# Patient Record
Sex: MALE | Race: BLACK OR AFRICAN AMERICAN | NOT HISPANIC OR LATINO
[De-identification: names, ages, dates, MRNs, and addresses within clinical notes are randomized per-mention and may not be internally consistent; named-entity substitution may affect disease eponyms.]

---

## 2016-07-01 RX ORDER — CARVEDILOL PHOSPHATE 80 MG/1
1 CAPSULE, EXTENDED RELEASE ORAL
Qty: 0 | Refills: 0 | COMMUNITY
Start: 2016-07-01

## 2016-07-01 RX ORDER — MIRTAZAPINE 45 MG/1
1 TABLET, ORALLY DISINTEGRATING ORAL
Qty: 0 | Refills: 0 | COMMUNITY
Start: 2016-07-01

## 2016-07-01 RX ORDER — FUROSEMIDE 40 MG
1 TABLET ORAL
Qty: 0 | Refills: 0 | COMMUNITY
Start: 2016-07-01

## 2017-01-19 ENCOUNTER — APPOINTMENT (OUTPATIENT)
Dept: ELECTROPHYSIOLOGY | Facility: CLINIC | Age: 72
End: 2017-01-19

## 2017-03-21 ENCOUNTER — NON-APPOINTMENT (OUTPATIENT)
Age: 72
End: 2017-03-21

## 2017-03-21 ENCOUNTER — APPOINTMENT (OUTPATIENT)
Dept: ELECTROPHYSIOLOGY | Facility: CLINIC | Age: 72
End: 2017-03-21

## 2017-03-21 VITALS — SYSTOLIC BLOOD PRESSURE: 122 MMHG | DIASTOLIC BLOOD PRESSURE: 83 MMHG

## 2017-03-21 VITALS
WEIGHT: 129 LBS | DIASTOLIC BLOOD PRESSURE: 74 MMHG | SYSTOLIC BLOOD PRESSURE: 110 MMHG | BODY MASS INDEX: 21.49 KG/M2 | HEART RATE: 88 BPM | OXYGEN SATURATION: 100 % | HEIGHT: 65 IN

## 2017-04-07 ENCOUNTER — LABORATORY RESULT (OUTPATIENT)
Age: 72
End: 2017-04-07

## 2017-04-07 ENCOUNTER — APPOINTMENT (OUTPATIENT)
Dept: ELECTROPHYSIOLOGY | Facility: CLINIC | Age: 72
End: 2017-04-07

## 2017-04-07 ENCOUNTER — NON-APPOINTMENT (OUTPATIENT)
Age: 72
End: 2017-04-07

## 2017-04-07 VITALS
BODY MASS INDEX: 21.49 KG/M2 | SYSTOLIC BLOOD PRESSURE: 137 MMHG | OXYGEN SATURATION: 100 % | HEART RATE: 89 BPM | WEIGHT: 129 LBS | HEIGHT: 65 IN | DIASTOLIC BLOOD PRESSURE: 96 MMHG

## 2017-04-07 RX ORDER — CARVEDILOL 3.12 MG/1
3.12 TABLET, FILM COATED ORAL
Qty: 60 | Refills: 2 | Status: DISCONTINUED | COMMUNITY
Start: 2017-03-21 | End: 2017-04-07

## 2017-04-07 RX ORDER — METOPROLOL SUCCINATE 50 MG/1
50 TABLET, EXTENDED RELEASE ORAL DAILY
Refills: 0 | Status: DISCONTINUED | COMMUNITY
End: 2017-04-07

## 2017-04-08 LAB
ALBUMIN SERPL ELPH-MCNC: 3.8 G/DL
ALP BLD-CCNC: 133 U/L
ALT SERPL-CCNC: 34 U/L
ANION GAP SERPL CALC-SCNC: 23 MMOL/L
AST SERPL-CCNC: 37 U/L
BASOPHILS # BLD AUTO: 0 K/UL
BASOPHILS NFR BLD AUTO: 0 %
BILIRUB SERPL-MCNC: 1 MG/DL
BUN SERPL-MCNC: 27 MG/DL
CALCIUM SERPL-MCNC: 10.5 MG/DL
CHLORIDE SERPL-SCNC: 95 MMOL/L
CO2 SERPL-SCNC: 24 MMOL/L
CREAT SERPL-MCNC: 1.19 MG/DL
EOSINOPHIL # BLD AUTO: 0.17 K/UL
EOSINOPHIL NFR BLD AUTO: 1.8 %
GLUCOSE SERPL-MCNC: 34 MG/DL
HCT VFR BLD CALC: 41.9 %
HGB BLD-MCNC: 13.8 G/DL
LYMPHOCYTES # BLD AUTO: 2.13 K/UL
LYMPHOCYTES NFR BLD AUTO: 22.9 %
MAN DIFF?: NORMAL
MCHC RBC-ENTMCNC: 32.9 GM/DL
MCHC RBC-ENTMCNC: 33.3 PG
MCV RBC AUTO: 101.2 FL
MONOCYTES # BLD AUTO: 0.51 K/UL
MONOCYTES NFR BLD AUTO: 5.5 %
NEUTROPHILS # BLD AUTO: 6.15 K/UL
NEUTROPHILS NFR BLD AUTO: 66.1 %
PLATELET # BLD AUTO: 300 K/UL
POTASSIUM SERPL-SCNC: 4.4 MMOL/L
PROT SERPL-MCNC: 7.7 G/DL
RBC # BLD: 4.14 M/UL
RBC # FLD: 18.7 %
SODIUM SERPL-SCNC: 142 MMOL/L
WBC # FLD AUTO: 9.31 K/UL

## 2017-05-02 ENCOUNTER — NON-APPOINTMENT (OUTPATIENT)
Age: 72
End: 2017-05-02

## 2017-05-02 ENCOUNTER — APPOINTMENT (OUTPATIENT)
Dept: ELECTROPHYSIOLOGY | Facility: CLINIC | Age: 72
End: 2017-05-02

## 2017-05-02 VITALS
WEIGHT: 129 LBS | BODY MASS INDEX: 21.49 KG/M2 | HEIGHT: 65 IN | OXYGEN SATURATION: 100 % | HEART RATE: 84 BPM | SYSTOLIC BLOOD PRESSURE: 112 MMHG | DIASTOLIC BLOOD PRESSURE: 74 MMHG

## 2017-06-29 ENCOUNTER — APPOINTMENT (OUTPATIENT)
Dept: ELECTROPHYSIOLOGY | Facility: CLINIC | Age: 72
End: 2017-06-29

## 2017-08-02 ENCOUNTER — INPATIENT (INPATIENT)
Facility: HOSPITAL | Age: 72
LOS: 7 days | Discharge: HOME HEALTH | End: 2017-08-10
Attending: HOSPITALIST | Admitting: HOSPITALIST
Payer: MEDICARE

## 2017-08-02 VITALS
TEMPERATURE: 99 F | DIASTOLIC BLOOD PRESSURE: 64 MMHG | HEART RATE: 94 BPM | RESPIRATION RATE: 16 BRPM | SYSTOLIC BLOOD PRESSURE: 93 MMHG | OXYGEN SATURATION: 100 %

## 2017-08-02 DIAGNOSIS — Z96.649 PRESENCE OF UNSPECIFIED ARTIFICIAL HIP JOINT: Chronic | ICD-10-CM

## 2017-08-02 LAB
ALBUMIN SERPL ELPH-MCNC: 3.4 G/DL — SIGNIFICANT CHANGE UP (ref 3.3–5)
ALP SERPL-CCNC: 90 U/L — SIGNIFICANT CHANGE UP (ref 40–120)
ALT FLD-CCNC: 27 U/L — SIGNIFICANT CHANGE UP (ref 4–41)
APTT BLD: 40.9 SEC — HIGH (ref 27.5–37.4)
AST SERPL-CCNC: 68 U/L — HIGH (ref 4–40)
BASOPHILS # BLD AUTO: 0.06 K/UL — SIGNIFICANT CHANGE UP (ref 0–0.2)
BASOPHILS NFR BLD AUTO: 0.6 % — SIGNIFICANT CHANGE UP (ref 0–2)
BILIRUB SERPL-MCNC: 1.1 MG/DL — SIGNIFICANT CHANGE UP (ref 0.2–1.2)
BUN SERPL-MCNC: 32 MG/DL — HIGH (ref 7–23)
CALCIUM SERPL-MCNC: 8.8 MG/DL — SIGNIFICANT CHANGE UP (ref 8.4–10.5)
CHLORIDE SERPL-SCNC: 98 MMOL/L — SIGNIFICANT CHANGE UP (ref 98–107)
CK MB BLD-MCNC: 2.28 NG/ML — SIGNIFICANT CHANGE UP (ref 1–6.6)
CK SERPL-CCNC: 155 U/L — SIGNIFICANT CHANGE UP (ref 30–200)
CO2 SERPL-SCNC: 18 MMOL/L — LOW (ref 22–31)
CREAT SERPL-MCNC: 1.65 MG/DL — HIGH (ref 0.5–1.3)
DIGOXIN SERPL-MCNC: 0.4 NG/ML — LOW (ref 0.8–2)
EOSINOPHIL # BLD AUTO: 0.02 K/UL — SIGNIFICANT CHANGE UP (ref 0–0.5)
EOSINOPHIL NFR BLD AUTO: 0.2 % — SIGNIFICANT CHANGE UP (ref 0–6)
GLUCOSE SERPL-MCNC: 149 MG/DL — HIGH (ref 70–99)
HCT VFR BLD CALC: 35.1 % — LOW (ref 39–50)
HGB BLD-MCNC: 11.4 G/DL — LOW (ref 13–17)
IMM GRANULOCYTES # BLD AUTO: 0.21 # — SIGNIFICANT CHANGE UP
IMM GRANULOCYTES NFR BLD AUTO: 1.9 % — HIGH (ref 0–1.5)
INR BLD: 1.76 — HIGH (ref 0.88–1.17)
LYMPHOCYTES # BLD AUTO: 1.91 K/UL — SIGNIFICANT CHANGE UP (ref 1–3.3)
LYMPHOCYTES # BLD AUTO: 17.6 % — SIGNIFICANT CHANGE UP (ref 13–44)
MCHC RBC-ENTMCNC: 32.5 % — SIGNIFICANT CHANGE UP (ref 32–36)
MCHC RBC-ENTMCNC: 34.8 PG — HIGH (ref 27–34)
MCV RBC AUTO: 107 FL — HIGH (ref 80–100)
MONOCYTES # BLD AUTO: 0.77 K/UL — SIGNIFICANT CHANGE UP (ref 0–0.9)
MONOCYTES NFR BLD AUTO: 7.1 % — SIGNIFICANT CHANGE UP (ref 2–14)
NEUTROPHILS # BLD AUTO: 7.91 K/UL — HIGH (ref 1.8–7.4)
NEUTROPHILS NFR BLD AUTO: 72.6 % — SIGNIFICANT CHANGE UP (ref 43–77)
NRBC # FLD: 0.08 — SIGNIFICANT CHANGE UP
PLATELET # BLD AUTO: 198 K/UL — SIGNIFICANT CHANGE UP (ref 150–400)
PMV BLD: 10.6 FL — SIGNIFICANT CHANGE UP (ref 7–13)
POTASSIUM SERPL-MCNC: SIGNIFICANT CHANGE UP MMOL/L (ref 3.5–5.3)
POTASSIUM SERPL-SCNC: SIGNIFICANT CHANGE UP MMOL/L (ref 3.5–5.3)
PROT SERPL-MCNC: 6.9 G/DL — SIGNIFICANT CHANGE UP (ref 6–8.3)
PROTHROM AB SERPL-ACNC: 19.9 SEC — HIGH (ref 9.8–13.1)
RBC # BLD: 3.28 M/UL — LOW (ref 4.2–5.8)
RBC # FLD: 17.1 % — HIGH (ref 10.3–14.5)
SODIUM SERPL-SCNC: 133 MMOL/L — LOW (ref 135–145)
TROPONIN T SERPL-MCNC: 0.09 NG/ML — HIGH (ref 0–0.06)
WBC # BLD: 10.88 K/UL — HIGH (ref 3.8–10.5)
WBC # FLD AUTO: 10.88 K/UL — HIGH (ref 3.8–10.5)

## 2017-08-02 PROCEDURE — 71010: CPT | Mod: 26

## 2017-08-02 PROCEDURE — 93971 EXTREMITY STUDY: CPT | Mod: 26,LT

## 2017-08-02 RX ORDER — ASPIRIN/CALCIUM CARB/MAGNESIUM 324 MG
162 TABLET ORAL ONCE
Qty: 0 | Refills: 0 | Status: COMPLETED | OUTPATIENT
Start: 2017-08-02 | End: 2017-08-02

## 2017-08-02 RX ORDER — ONDANSETRON 8 MG/1
4 TABLET, FILM COATED ORAL ONCE
Qty: 0 | Refills: 0 | Status: COMPLETED | OUTPATIENT
Start: 2017-08-02 | End: 2017-08-02

## 2017-08-02 RX ORDER — AMIODARONE HYDROCHLORIDE 400 MG/1
2 TABLET ORAL
Qty: 0 | Refills: 0 | COMMUNITY
Start: 2017-08-02

## 2017-08-02 RX ORDER — SODIUM CHLORIDE 9 MG/ML
500 INJECTION INTRAMUSCULAR; INTRAVENOUS; SUBCUTANEOUS ONCE
Qty: 0 | Refills: 0 | Status: COMPLETED | OUTPATIENT
Start: 2017-08-02 | End: 2017-08-02

## 2017-08-02 RX ADMIN — Medication 162 MILLIGRAM(S): at 22:49

## 2017-08-02 RX ADMIN — ONDANSETRON 4 MILLIGRAM(S): 8 TABLET, FILM COATED ORAL at 22:49

## 2017-08-02 NOTE — ED ADULT TRIAGE NOTE - CHIEF COMPLAINT QUOTE
Pt arrives w/ c/o midline abd pain radiating up through Chest and HA also.  Hx CVA w/ residual Rt sided weakness (in own wheelchair and rt arm in sling for support), speech is slurred per family this is baseline normal speech, AFib, AICD Defib, CHF and on coumadin.  Pt reports feeling CP and nausea (dry heaving observed by family) since 3pm this afternoon.  Pt has swelling to b/l LExt worse on Rt, per family has been worse today.  No fevers/chills that family can recall.  BP hypotensive 93/64 HR 94 in triage. EKG done in triage.

## 2017-08-02 NOTE — ED ADULT NURSE REASSESSMENT NOTE - NS ED NURSE REASSESS COMMENT FT1
RN Break Coverage: Alert and oriented x 2. This is pt normal mental status.  Pt received from RN Julio in no distress. VSS. Will continue to monitor.

## 2017-08-02 NOTE — ED ADULT NURSE NOTE - OBJECTIVE STATEMENT
Pt received in #27, aaox2 with c/o abd pain, chest pain, headache, b/l lower extremity swelling and pain to right calf. Denies hx of clots but pt is wheelchair bound and on coumadin. Pt on CM in Afib, iv established, labs sent. Pt noted to be hypotensive, per pt's daughter the pt's bp has been low recently. Cardiologist changed medications recently.

## 2017-08-02 NOTE — ED PROVIDER NOTE - OBJECTIVE STATEMENT
72M hx of CHF s/p AICD, Afib on coumadin, CVA with residual right hemiparesis presents with chest pain, headache and Rt leg pain. Chest pain started 7hour ago, at rest, right sided and non radiational. Associated nausea and vomiting but no diaphoresis. Endorsed to new calf pain in the right leg with swelling. Pt is hypotensive for the past few days and his cardiologist (Dr. Gilliland) aware. Pt is put on new medications since yesterday (Amiodarone 200mg once daily and Larvediol (3.12 x 2, BID). Pt also d/c Digoxin since yesterday. 72M hx of CHF s/p AICD, Afib on coumadin, CVA with residual right hemiparesis presents with chest pain, headache and Rt leg pain. Chest pain started 7hour ago, at rest, right sided and non radiational. Associated nausea and vomiting but no diaphoresis. Endorsed to new calf pain in the right leg with swelling. Pt is hypotensive for the past few days and his cardiologist (Dr. Gilliland) aware. Pt is put on new medications since yesterday (Amiodarone 200mg once daily and Larvediol (3.12 x 2, BID). Pt also d/c Digoxin since yesterday.  23:11 Sidhu att 72M h/o afib on coumadin, chf ef 20% aicd, cva residual rle weakness, temporal arteritis on prednisone p/w cp ha. Past few days unwell, today rt sided chest shoulder and abdominal pain. Ha on off x 4 years. Yesterday doctor switched from digoxin to amiodarone and reduced carvedilol dose in half. 72M hx of CHF s/p AICD, Afib on coumadin, CVA with residual right hemiparesis presents with chest pain, headache and Rt leg pain. Chest pain started 7hour ago, at rest, right sided and non radiational. Associated nausea and vomiting but no diaphoresis. Endorsed to new calf pain in the right leg with swelling. Pt is hypotensive for the past few days and his cardiologist (Dr. Gilliland) aware. Pt is put on new medications since yesterday (Amiodarone 200mg once daily and Larvediol (3.12 x 2, BID). Pt also d/c Digoxin since yesterday.  23:11 Sidhu att 72M h/o afib on coumadin, chf ef 20% aicd, cva residual rle weakness, temporal arteritis on prednisone p/w cp ha. Patient baseline aphasic and limited historian. Reports he has been unwell past few days. today rt sided chest shoulder and abdominal pain. Unclear if radiating into back. Hypotensive in ED. Ha on off x 4 years. Yesterday doctor switched from digoxin to amiodarone and reduced carvedilol dose in half.

## 2017-08-02 NOTE — ED PROVIDER NOTE - PMH
Atrial fibrillation    CHF (congestive heart failure)  With EF of 20%  CVA (cerebral vascular accident)  in 2013 with right sided hemiparesis and dysarthria  Temporal arteritis

## 2017-08-02 NOTE — ED PROVIDER NOTE - ATTENDING CONTRIBUTION TO CARE
Dr. Sidhu: I have personally seen and examined this patient at the bedside. I have fully participated in the care of this patient. I have reviewed all pertinent clinical information, including history, physical exam, plan and the Resident's note and agree except as noted. HPI above as by me. PE above as by me. EKG neg acute st abnormality DDX aortic diss versus nstemi PLAN cta r/o dissection, ce x1, cxr, admit to tele r/o acs if neg dissection.

## 2017-08-02 NOTE — ED PROVIDER NOTE - PHYSICAL EXAMINATION
Rt leg swelling and calf tenderness.   No abd pain.  AOx3. Speaking in full sentences. Rt leg swelling and calf tenderness.   No abd pain.  AOx3. Speaking in full sentences.    Sidhu att: nad, ctabl, rrr, s1s2, abd soft ntnd, neg le edema

## 2017-08-03 DIAGNOSIS — C61 MALIGNANT NEOPLASM OF PROSTATE: ICD-10-CM

## 2017-08-03 DIAGNOSIS — R07.9 CHEST PAIN, UNSPECIFIED: ICD-10-CM

## 2017-08-03 DIAGNOSIS — Z29.9 ENCOUNTER FOR PROPHYLACTIC MEASURES, UNSPECIFIED: ICD-10-CM

## 2017-08-03 DIAGNOSIS — M31.6 OTHER GIANT CELL ARTERITIS: ICD-10-CM

## 2017-08-03 DIAGNOSIS — I48.91 UNSPECIFIED ATRIAL FIBRILLATION: ICD-10-CM

## 2017-08-03 DIAGNOSIS — F32.9 MAJOR DEPRESSIVE DISORDER, SINGLE EPISODE, UNSPECIFIED: ICD-10-CM

## 2017-08-03 LAB
BASE EXCESS BLDV CALC-SCNC: 0 MMOL/L — SIGNIFICANT CHANGE UP
BASE EXCESS BLDV CALC-SCNC: 0.1 MMOL/L — SIGNIFICANT CHANGE UP
BLOOD GAS VENOUS - CREATININE: 1.53 MG/DL — HIGH (ref 0.5–1.3)
BLOOD GAS VENOUS - CREATININE: 1.57 MG/DL — HIGH (ref 0.5–1.3)
CHLORIDE BLDV-SCNC: 99 MMOL/L — SIGNIFICANT CHANGE UP (ref 96–108)
CHLORIDE BLDV-SCNC: 99 MMOL/L — SIGNIFICANT CHANGE UP (ref 96–108)
CK MB BLD-MCNC: 2.26 NG/ML — SIGNIFICANT CHANGE UP (ref 1–6.6)
CK MB BLD-MCNC: SIGNIFICANT CHANGE UP (ref 0–2.5)
CK SERPL-CCNC: 70 U/L — SIGNIFICANT CHANGE UP (ref 30–200)
CK SERPL-CCNC: 74 U/L — SIGNIFICANT CHANGE UP (ref 30–200)
GAS PNL BLDV: 135 MMOL/L — LOW (ref 136–146)
GAS PNL BLDV: 135 MMOL/L — LOW (ref 136–146)
GLUCOSE BLDV-MCNC: 141 — HIGH (ref 70–99)
GLUCOSE BLDV-MCNC: 152 — HIGH (ref 70–99)
HCO3 BLDV-SCNC: 23 MMOL/L — SIGNIFICANT CHANGE UP (ref 20–27)
HCO3 BLDV-SCNC: 23 MMOL/L — SIGNIFICANT CHANGE UP (ref 20–27)
HCT VFR BLDV CALC: 34.5 % — LOW (ref 39–51)
HCT VFR BLDV CALC: 35.1 % — LOW (ref 39–51)
HGB BLDV-MCNC: 11.2 G/DL — LOW (ref 13–17)
HGB BLDV-MCNC: 11.4 G/DL — LOW (ref 13–17)
LACTATE BLDV-MCNC: 3.7 MMOL/L — HIGH (ref 0.5–2)
LACTATE BLDV-MCNC: 3.8 MMOL/L — HIGH (ref 0.5–2)
NT-PROBNP SERPL-SCNC: 4762 PG/ML — SIGNIFICANT CHANGE UP
PCO2 BLDV: 50 MMHG — SIGNIFICANT CHANGE UP (ref 41–51)
PCO2 BLDV: 50 MMHG — SIGNIFICANT CHANGE UP (ref 41–51)
PH BLDV: 7.32 PH — SIGNIFICANT CHANGE UP (ref 7.32–7.43)
PH BLDV: 7.33 PH — SIGNIFICANT CHANGE UP (ref 7.32–7.43)
PO2 BLDV: < 24 MMHG — LOW (ref 35–40)
PO2 BLDV: < 24 MMHG — LOW (ref 35–40)
POTASSIUM BLDV-SCNC: 4.3 MMOL/L — SIGNIFICANT CHANGE UP (ref 3.4–4.5)
POTASSIUM BLDV-SCNC: 4.8 MMOL/L — HIGH (ref 3.4–4.5)
SAO2 % BLDV: 24.7 % — LOW (ref 60–85)
SAO2 % BLDV: 25.3 % — LOW (ref 60–85)
TROPONIN T SERPL-MCNC: 0.1 NG/ML — HIGH (ref 0–0.06)
TROPONIN T SERPL-MCNC: 0.12 NG/ML — HIGH (ref 0–0.06)

## 2017-08-03 PROCEDURE — 74174 CTA ABD&PLVS W/CONTRAST: CPT | Mod: 26

## 2017-08-03 PROCEDURE — 71275 CT ANGIOGRAPHY CHEST: CPT | Mod: 26

## 2017-08-03 PROCEDURE — 99222 1ST HOSP IP/OBS MODERATE 55: CPT

## 2017-08-03 PROCEDURE — 93925 LOWER EXTREMITY STUDY: CPT | Mod: 26

## 2017-08-03 PROCEDURE — 99223 1ST HOSP IP/OBS HIGH 75: CPT | Mod: AI

## 2017-08-03 RX ORDER — WARFARIN SODIUM 2.5 MG/1
5 TABLET ORAL ONCE
Qty: 0 | Refills: 0 | Status: COMPLETED | OUTPATIENT
Start: 2017-08-03 | End: 2017-08-03

## 2017-08-03 RX ORDER — FUROSEMIDE 40 MG
40 TABLET ORAL
Qty: 0 | Refills: 0 | Status: DISCONTINUED | OUTPATIENT
Start: 2017-08-03 | End: 2017-08-03

## 2017-08-03 RX ORDER — CARVEDILOL PHOSPHATE 80 MG/1
3.12 CAPSULE, EXTENDED RELEASE ORAL EVERY 12 HOURS
Qty: 0 | Refills: 0 | Status: DISCONTINUED | OUTPATIENT
Start: 2017-08-03 | End: 2017-08-08

## 2017-08-03 RX ORDER — PANTOPRAZOLE SODIUM 20 MG/1
40 TABLET, DELAYED RELEASE ORAL
Qty: 0 | Refills: 0 | Status: DISCONTINUED | OUTPATIENT
Start: 2017-08-03 | End: 2017-08-10

## 2017-08-03 RX ORDER — MIRTAZAPINE 45 MG/1
45 TABLET, ORALLY DISINTEGRATING ORAL AT BEDTIME
Qty: 0 | Refills: 0 | Status: DISCONTINUED | OUTPATIENT
Start: 2017-08-03 | End: 2017-08-10

## 2017-08-03 RX ORDER — ATORVASTATIN CALCIUM 80 MG/1
20 TABLET, FILM COATED ORAL AT BEDTIME
Qty: 0 | Refills: 0 | Status: DISCONTINUED | OUTPATIENT
Start: 2017-08-03 | End: 2017-08-10

## 2017-08-03 RX ORDER — LATANOPROST 0.05 MG/ML
1 SOLUTION/ DROPS OPHTHALMIC; TOPICAL AT BEDTIME
Qty: 0 | Refills: 0 | Status: DISCONTINUED | OUTPATIENT
Start: 2017-08-03 | End: 2017-08-10

## 2017-08-03 RX ORDER — ISOSORBIDE MONONITRATE 60 MG/1
30 TABLET, EXTENDED RELEASE ORAL DAILY
Qty: 0 | Refills: 0 | Status: DISCONTINUED | OUTPATIENT
Start: 2017-08-03 | End: 2017-08-10

## 2017-08-03 RX ORDER — AMIODARONE HYDROCHLORIDE 400 MG/1
200 TABLET ORAL DAILY
Qty: 0 | Refills: 0 | Status: DISCONTINUED | OUTPATIENT
Start: 2017-08-09 | End: 2017-08-09

## 2017-08-03 RX ORDER — AMIODARONE HYDROCHLORIDE 400 MG/1
400 TABLET ORAL DAILY
Qty: 0 | Refills: 0 | Status: COMPLETED | OUTPATIENT
Start: 2017-08-03 | End: 2017-08-09

## 2017-08-03 RX ORDER — ASPIRIN/CALCIUM CARB/MAGNESIUM 324 MG
81 TABLET ORAL DAILY
Qty: 0 | Refills: 0 | Status: DISCONTINUED | OUTPATIENT
Start: 2017-08-03 | End: 2017-08-10

## 2017-08-03 RX ORDER — ACETAMINOPHEN 500 MG
650 TABLET ORAL EVERY 6 HOURS
Qty: 0 | Refills: 0 | Status: DISCONTINUED | OUTPATIENT
Start: 2017-08-03 | End: 2017-08-10

## 2017-08-03 RX ADMIN — SODIUM CHLORIDE 1000 MILLILITER(S): 9 INJECTION INTRAMUSCULAR; INTRAVENOUS; SUBCUTANEOUS at 00:47

## 2017-08-03 RX ADMIN — Medication 81 MILLIGRAM(S): at 12:52

## 2017-08-03 RX ADMIN — PANTOPRAZOLE SODIUM 40 MILLIGRAM(S): 20 TABLET, DELAYED RELEASE ORAL at 12:52

## 2017-08-03 RX ADMIN — Medication 10 MILLIGRAM(S): at 12:52

## 2017-08-03 RX ADMIN — WARFARIN SODIUM 5 MILLIGRAM(S): 2.5 TABLET ORAL at 19:30

## 2017-08-03 NOTE — CONSULT NOTE ADULT - SUBJECTIVE AND OBJECTIVE BOX
HPI:  Mr. Salomon is a 72 year-old man with history of atrial fibrillation, stroke with residual right hemiparesis, temporal arteritis, and systolic congestive heart failure (EF20%), who presented today to the [facility] ER with chest pain, headache, and RLE pain for 1 day. He admits as well to nausea and vomiting; he denies shortness of breath. He was noted on admission to have an elevated BUN/creatinine at 32/1.65. Therefore, a renal consultation was requested.      PAST MEDICAL & SURGICAL HISTORY:  Temporal arteritis  CVA (cerebral vascular accident): in  with right sided hemiparesis and dysarthria  CHF (congestive heart failure): With EF of 20%  Atrial fibrillation  History of hip replacement      MEDICATIONS  (STANDING):  amiodarone    Tablet 400 milliGRAM(s) Oral daily  carvedilol 3.125 milliGRAM(s) Oral every 12 hours  isosorbide   mononitrate ER Tablet (IMDUR) 30 milliGRAM(s) Oral daily  aspirin  chewable 81 milliGRAM(s) Oral daily  predniSONE   Tablet 10 milliGRAM(s) Oral daily  pantoprazole    Tablet 40 milliGRAM(s) Oral before breakfast  warfarin 5 milliGRAM(s) Oral once    Allergies  No Known Allergies    SOCIAL HISTORY:  Denies ETOh,Smoking,     FAMILY HISTORY:  No CKD    REVIEW OF SYSTEMS:  CONSTITUTIONAL: No generalized weakness, fevers or chills  EYES/ENT: No visual changes;  No vertigo or throat pain. (+)HA  NECK: No pain or stiffness  RESPIRATORY: No cough, wheezing, hemoptysis; No shortness of breath  CARDIOVASCULAR: (+)chest pain; no palpitations  GASTROINTESTINAL: (+)N/V. No abdominal or epigastric pain. No diarrhea or constipation. No melena or hematochezia.  GENITOURINARY: No dysuria, frequency or hematuria  NEUROLOGICAL: No numbness; (+)Right-sided weakness  SKIN: No itching, burning, rashes, or lesions   All other review of systems is negative unless indicated above.      VITAL:  T(C): , Max: 37.1 (17 @ 21:13)  T(F): , Max: 98.8 (17 @ 21:13)  HR: 63 (17 @ 06:05)  BP: 93/65 (17 @ 06:05)  BP(mean): --  RR: 18 (17 @ 06:05)  SpO2: 100% (17 @ 06:05)      PHYSICAL EXAM:  Constitutional: Thin to cachectic; dysarthric, NAD  HEENT: NCAT, DMM, (+)right facial droop  Neck: Supple, No JVD  Respiratory: CTA-b/l  Cardiovascular: Irreg, S1S2  Gastrointestinal: BS+, soft, NT, (+)mild distension  Extremities: No peripheral edema b/l  Neurological: (+)RLE weakness; (+)R facial droop  Psychiatric: Normal mood, normal affect  Back: no CVAT b/l  Skin: No rashes, no nevi    LABS/IMAGIN.4   10.88 )-----------( 198      ( 02 Aug 2017 22:20 )             35.1     Na(133)/K(--)/Cl(98)/HCO3(18)/BUN(32)/Cr(1.65)Glu(149)/Ca(8.8)/Mg(--)/PO4(--)     @ 22:20    (16) - BUN/creatinine 30/1.02    CT A/P I+ C/A/P (8/3/17)  IMPRESSION:   No aortic dissection.  Limited evaluation of the distal abdominal aorta and its branches as described.  No central pulmonary embolism. Bibasilar subsegmental atelectasis.  Nonspecific gallbladder wall edema/pericholecystic fluid.  Further characterization with ultrasound or HIDA scan may be considered.No hydronephrosis b/l      IMPRESSION:    (1)Renal - BRYCE relative to 2016 - for how long has his creatinine been elevated? Did it just rise within the past 1-2 days, or was it in the mid 1s as of months ago? Unclear exact etiology. No obstruction appreciated on CT. S/P CT I+ last night; no evidence of contrast nephropathy to date.    (2)Metabolic acidosis - mild - elevated anion gap (17) - lactic acidosis? Starvation ketoacidosis?    (3)Chest pain - ischemic?       RECOMMEND:    (1)BMP daily  (2)Dose new meds for GFR 30-40ml/min  (3)Urine: lytes, creatinine, urinalysis, protein  (4)SPEP, Immunofixation, LDH, beta-hydroxybutrate, lactate  (5)Ischemic workup per cardiology; if to require cardiac cath, would look to give mucomyst 1200mg po bid x 4 doses as well as 1/2NS + 75meq/L NaHCO3 periprocedurally (250 cc pre-cath and 125cc/h x 4 hours post-cath)  (6)No need for treatment with HCO3 for now for metabolic acidosis  (7)No IVF/No diuretics for now    Thank you for involving [facility] in this patient's care.    With warm regards,    Lacho Ashton MD  [facility]  [phone] HPI:  Mr. Salomon is a 72 year-old man with history of atrial fibrillation, stroke with residual right hemiparesis, temporal arteritis, and systolic congestive heart failure (EF20%), who presented today to the [facility] ER with chest pain, headache, and RLE pain for 1 day. He admits as well to nausea and vomiting; he denies shortness of breath. He was noted on admission to have an elevated BUN/creatinine at 32/1.65. Therefore, a renal consultation was requested.      PAST MEDICAL & SURGICAL HISTORY:  Temporal arteritis  CVA (cerebral vascular accident): in  with right sided hemiparesis and dysarthria  CHF (congestive heart failure): With EF of 20%  Atrial fibrillation  History of hip replacement      MEDICATIONS  (STANDING):  amiodarone    Tablet 400 milliGRAM(s) Oral daily  carvedilol 3.125 milliGRAM(s) Oral every 12 hours  isosorbide   mononitrate ER Tablet (IMDUR) 30 milliGRAM(s) Oral daily  aspirin  chewable 81 milliGRAM(s) Oral daily  predniSONE   Tablet 10 milliGRAM(s) Oral daily  pantoprazole    Tablet 40 milliGRAM(s) Oral before breakfast  warfarin 5 milliGRAM(s) Oral once    Allergies  No Known Allergies    SOCIAL HISTORY:  Denies ETOh,Smoking,     FAMILY HISTORY:  No CKD    REVIEW OF SYSTEMS:  CONSTITUTIONAL: No generalized weakness, fevers or chills  EYES/ENT: No visual changes;  No vertigo or throat pain. (+)HA  NECK: No pain or stiffness  RESPIRATORY: No cough, wheezing, hemoptysis; No shortness of breath  CARDIOVASCULAR: (+)chest pain; no palpitations  GASTROINTESTINAL: (+)N/V. No abdominal or epigastric pain. No diarrhea or constipation. No melena or hematochezia.  GENITOURINARY: No dysuria, frequency or hematuria  NEUROLOGICAL: No numbness; (+)Right-sided weakness  SKIN: No itching, burning, rashes, or lesions   All other review of systems is negative unless indicated above.      VITAL:  T(C): , Max: 37.1 (17 @ 21:13)  T(F): , Max: 98.8 (17 @ 21:13)  HR: 63 (17 @ 06:05)  BP: 93/65 (17 @ 06:05)  BP(mean): --  RR: 18 (17 @ 06:05)  SpO2: 100% (17 @ 06:05)      PHYSICAL EXAM:  Constitutional: Thin to cachectic; dysarthric, NAD  HEENT: NCAT, DMM, (+)right facial droop  Neck: Supple, No JVD  Respiratory: CTA-b/l  Cardiovascular: Irreg, S1S2  Gastrointestinal: BS+, soft, NT, (+)mild distension  Extremities: No peripheral edema b/l  Neurological: (+)RLE weakness; (+)R facial droop  Psychiatric: Normal mood, normal affect  Back: no CVAT b/l  Skin: No rashes, no nevi    LABS/IMAGIN.4   10.88 )-----------( 198      ( 02 Aug 2017 22:20 )             35.1     Na(133)/K(--)/Cl(98)/HCO3(18)/BUN(32)/Cr(1.65)Glu(149)/Ca(8.8)/Mg(--)/PO4(--)     @ 22:20    (16) - BUN/creatinine 30/1.02    CT A/P I+ C/A/P (8/3/17)  IMPRESSION:   No aortic dissection.  Limited evaluation of the distal abdominal aorta and its branches as described.  No central pulmonary embolism. Bibasilar subsegmental atelectasis.  Nonspecific gallbladder wall edema/pericholecystic fluid.  Further characterization with ultrasound or HIDA scan may be considered.No hydronephrosis b/l      IMPRESSION:    (1)Renal - BRYCE relative to 2016 - for how long has his creatinine been elevated? Did it just rise within the past 1-2 days, or was it in the mid 1s as of months ago? Unclear exact etiology. No obstruction appreciated on CT. S/P CT I+ last night; no evidence of contrast nephropathy to date.    (2)Metabolic acidosis - mild - elevated anion gap (17) - lactic acidosis? Starvation ketoacidosis?    (3)Chest pain - ischemic?       RECOMMEND:    (1)BMP daily  (2)Dose new meds for GFR 30-40ml/min  (3)Urine: lytes, creatinine, urinalysis, protein  (4)SPEP, Immunofixation, LDH, beta-hydroxybutrate, lactate  (5)Ischemic workup per cardiology; if to require cardiac cath, would look to give mucomyst 1200mg po bid x 4 doses as well as 1/2NS + 75meq/L NaHCO3 periprocedurally (250 cc pre-cath and 125cc/h x 4 hours post-cath)  (6)No need for treatment with HCO3 for now for metabolic acidosis  (7)No IVF/No diuretics for now  (8)Efforts to obtain recent bloodwork/find out creatinine from winter/spring of 2017.    Thank you for involving [facility] in this patient's care.    With warm regards,    Lacho Ashton MD  [facility]  [phone]

## 2017-08-03 NOTE — H&P ADULT - NEGATIVE OPHTHALMOLOGIC SYMPTOMS
no blurred vision L/no blurred vision R/no loss of vision L/no photophobia/no diplopia/no loss of vision R

## 2017-08-03 NOTE — H&P ADULT - ASSESSMENT
73 y/o male, with a PmHx of CHF with an EF of 20%, AICD, Prostate Ca s/p seed implants, Afib on ac, CVA with residual right hemiparesis, is being admitted to telemetry for r/o acs.

## 2017-08-03 NOTE — H&P ADULT - HISTORY OF PRESENT ILLNESS
71 y/o male, with a PmHx of CHF with an EF of 20%, AICD, Prostate Ca s/p seed implants, Afib on ac, CVA with residual right hemiparesis, presented to the ED c/o chest pain and right LE pain. Pt is a poor historian (difficult to understand secondary to the old cva and has residual expressive aphasia and he had trouble remembering certain things). Pt was recently at his PMD's office Dr. Rodriguez for an issue with his medications. Dr. Reese stated the patient was given coreg 15.625mg bid of Coreg instead of 3.125mg po bid and because of some issues with pharmacy he did not get his prescription of Amiodarone at that time after being switched from Digoxin. Today, he states overnight he had a right sided chest pain with some nausea and vomiting and right calf pain so he came to the [facility] ED today for an evaluation. Pt couldn't describe the pain but still has a RLE pain. He denies any fever, chills, sob, blurred vision, dizziness. He states he gets HA's a lot. Attempted to call his wife whom he lives with but was not answering her phone. Pt had a recent stress test done on 7/31/17 that was positive. He is being admitted to telemetry for further management. Findings discussed with Dr. Gilliam.

## 2017-08-03 NOTE — H&P ADULT - NSHPSOCIALHISTORY_GEN_ALL_CORE
Marital Status:     Occupation: Retired    Tobacco Use: d/c smoking in 2013 after having his stroke    ETOH Use: neg    Flu Vaccine:     ?                             Pneumonia Vaccine: ?

## 2017-08-03 NOTE — H&P ADULT - NSHPPHYSICALEXAM_GEN_ALL_CORE
Vital Signs Last 24 Hrs  T(C): 36.4 (03 Aug 2017 06:05), Max: 37.1 (02 Aug 2017 21:13)  T(F): 97.5 (03 Aug 2017 06:05), Max: 98.8 (02 Aug 2017 21:13)  HR: 110 (03 Aug 2017 12:50) (60 - 110)  BP: 90/51 (03 Aug 2017 12:50) (90/51 - 93/73)  BP(mean): --  RR: 18 (03 Aug 2017 12:50) (16 - 18)  SpO2: 100% (03 Aug 2017 12:50) (100% - 100%)    EKG: A-paced @ 63

## 2017-08-03 NOTE — H&P ADULT - ATTENDING COMMENTS
Pt seen and examined by me , Agree with Tele PA.  In brief, Mr Queen is a 73 y/o male, with a PmHx of CHF with an EF of 20%, AICD, Prostate Ca s/p seed implants, Afib on ac, CVA with residual right hemiparesis, presented to the ED c/o chest pain and right LE pain .As per pt and son at bedside, pain started yday evening, was about a 7/10 , left sided resolved spontaneously,and has not had the pain since. He experiences interemittent chest pain on and off. In addition, he also c/o piain in both legs.  In the ED, labs were signifacant for BRYCE on CKD, elevated topronins.EKG was  a paced rhythm and pt was given Aspirin 162 and admitted to r/o NSTEMI.  On my exam, pt was resting comfortably, pt with right hemiparesis and dysarthia( chronic) , postiive JVD, irregular rhythm , SBP in low 90s  I disucssed with Cardiology attending- low suspicion for ACS, no indication for Heparin gett. Trend CE, await TTE  I called pts oupt Cardiologist - Dr Rodriguez-Dr Rodriguez on vacation. HIs office faxed results of recent stress test done in June 2017 which was positive for ischemia. Dr Rodriguez also started patient on a Amio load , first dose yday. Results discussed with cardiology fellow. outpt reports placed in chart  Cardiac meds including Lasix, Lisinopril,  on hold for today as SBP in low 90s, will need to restart pending hemodynamic status.As per outpt cardiology notes, pt with chronic hypotension  Pt with decreased pulses LE- will await arterial dopplers  Dose coumadin, follow INR  check orthostatic hypotension  restart Cardiac meds as SBP tolerates

## 2017-08-03 NOTE — H&P ADULT - PMH
Atrial fibrillation    CHF (congestive heart failure)  With EF of 20%  CVA (cerebral vascular accident)  in 2013 with right sided hemiparesis and dysarthria  Depression    Hypotension    Prostate cancer  s/p seed implants  Temporal arteritis

## 2017-08-03 NOTE — H&P ADULT - RS GEN PE MLT RESP DETAILS PC
respirations non-labored/clear to auscultation bilaterally/no chest wall tenderness/good air movement/breath sounds equal/airway patent

## 2017-08-03 NOTE — CONSULT NOTE ADULT - SUBJECTIVE AND OBJECTIVE BOX
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 No nausea, vomiting, or hematemesis; No diarrhea or constipation. No melena or hematochezia.  GENITOURINARY: No dysuria, frequency or hematuria  NEUROLOGICAL: No numbness or weakness  SKIN: No itching, burning, rashes, or lesions   All other review of systems is negative unless indicated above.    PHYSICAL EXAM:  T(C): 37.1 (08-02-17 @ 21:13), Max: 37.1 (08-02-17 @ 21:13)  HR: 60 (08-03-17 @ 02:54) (60 - 94)  BP: 91/64 (08-03-17 @ 02:54) (91/57 - 93/73)  RR: 18 (08-03-17 @ 02:54) (16 - 18)  SpO2: 100% (08-03-17 @ 02:54) (100% - 100%)  Wt(kg): --    Appearance: Normal	  HEENT:   Normal oral mucosa, PERRL, EOMI	  Lymphatic: No lymphadenopathy  Cardiovascular: Normal S1 S2, No JVD, No murmurs, No edema  Respiratory: Lungs clear to auscultation	  Psychiatry: A & O x 3, Mood & affect appropriate  Gastrointestinal:  Soft, Non-tender, + BS	  Skin: No rashes, No ecchymoses, No cyanosis	  Neurologic: Non-focal, A&Ox3, nonfocal, MAYO x 4  Extremities: Normal range of motion, No clubbing, cyanosis or edema  Vascular: Peripheral pulses palpable 2+ bilaterally    I&O's Summary    	 	  LABS:	 	                          11.4   10.88 )-----------( 198      ( 02 Aug 2017 22:20 )             35.1     08-02    133<L>  |  98  |  32<H>  ----------------------------<  149<H>  x    |  18<L>  |  1.65<H>    Ca    8.8      02 Aug 2017 22:20    TPro  6.9  /  Alb  3.4  /  TBili  1.1  /  DBili  x   /  AST  68<H>  /  ALT  27  /  AlkPhos  90  08-02    LIVER FUNCTIONS - ( 02 Aug 2017 22:20 )  Alb: 3.4 g/dL / Pro: 6.9 g/dL / ALK PHOS: 90 u/L / ALT: 27 u/L / AST: 68 u/L / GGT: x             proBNP: Serum Pro-Brain Natriuretic Peptide: 4762 pg/mL (08-03 @ 02:30)    Lipid Profile:   HgA1c:   TSH:   PT/INR - ( 02 Aug 2017 22:20 )   PT: 19.9 SEC;   INR: 1.76          PTT - ( 02 Aug 2017 22:20 )  PTT:40.9 SEC  CARDIAC MARKERS:  Troponin T, Serum: 0.12 ng/mL (08-03 @ 02:30)  Troponin T, Serum: 0.09 ng/mL (08-02 @ 22:20)    Creatine Kinase, Serum: 74 u/L (08-03 @ 02:30)  Creatine Kinase, Serum: 155 u/L (08-02 @ 22:20)    CKMB: 2.26 ng/mL (08-03 @ 02:30)  CKMB: 2.28 ng/mL (08-02 @ 22:20)      Blood Gas Venous - Lactate: 3.7 mmol/L (08-03 @ 02:30)  Blood Gas Venous - Lactate: 3.8 mmol/L (08-03 @ 00:40)      CAPILLARY BLOOD GLUCOSE                TELEMETRY: 	    ECG:  	  RADIOLOGY:  OTHER: 	    PREVIOUS DIAGNOSTIC TESTING:    [ ] Echocardiogram:  [ ]  Catheterization:  [ ] Stress Test: HISTORY OF PRESENT ILLNESS:      HPI: 72M hx of   chronic systolic and diastolic dysfunction (EF 20%), LBBB, biventricular ICD, Afib on coumadin, CVA with residual right hemiparesis,temporal arteritis,  presents with chest pain,dizziness and Rt leg pain. Chest pain is intermittent and non radiating. Endorses nausea and vomiting but no diaphoresis.Pt seen by  his cardiologist (Dr. Gilliland) who discontinued Digoxin and started Amiodarone 200mg once daily and decreased coreg to 3.12 x 2, BID since yesterday. In ED right lower extremity doppler (-) DVT, CTA (-) PE, aotic dissection and pericardial effusion. 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 No nausea, vomiting, or hematemesis; No diarrhea or constipation. No melena or hematochezia.  GENITOURINARY: No dysuria, frequency or hematuria  NEUROLOGICAL: No numbness or weakness  SKIN: No itching, burning, rashes, or lesions   All other review of systems is negative unless indicated above.    PHYSICAL EXAM:  T(C): 37.1 (08-02-17 @ 21:13), Max: 37.1 (08-02-17 @ 21:13)  HR: 60 (08-03-17 @ 02:54) (60 - 94)  BP: 91/64 (08-03-17 @ 02:54) (91/57 - 93/73)  RR: 18 (08-03-17 @ 02:54) (16 - 18)  SpO2: 100% (08-03-17 @ 02:54) (100% - 100%)  Wt(kg): --    Appearance: Normal	  HEENT:   Normal oral mucosa, PERRL, EOMI	  Lymphatic: No lymphadenopathy  Cardiovascular: Normal S1 S2, No JVD, No murmurs, No edema  Respiratory: Lungs clear to auscultation	  Psychiatry: A & O x 3, Mood & affect appropriate  Gastrointestinal:  Soft, Non-tender, + BS	  Skin: No rashes, No ecchymoses, No cyanosis	  Neurologic: Non-focal, A&Ox3, nonfocal, MAYO x 4  Extremities: Normal range of motion, No clubbing, cyanosis or edema  Vascular: Peripheral pulses palpable 2+ bilaterally    I&O's Summary    	 	  LABS:	 	                          11.4   10.88 )-----------( 198      ( 02 Aug 2017 22:20 )             35.1     08-02    133<L>  |  98  |  32<H>  ----------------------------<  149<H>  x    |  18<L>  |  1.65<H>    Ca    8.8      02 Aug 2017 22:20    TPro  6.9  /  Alb  3.4  /  TBili  1.1  /  DBili  x   /  AST  68<H>  /  ALT  27  /  AlkPhos  90  08-02    LIVER FUNCTIONS - ( 02 Aug 2017 22:20 )  Alb: 3.4 g/dL / Pro: 6.9 g/dL / ALK PHOS: 90 u/L / ALT: 27 u/L / AST: 68 u/L / GGT: x             proBNP: Serum Pro-Brain Natriuretic Peptide: 4762 pg/mL (08-03 @ 02:30)    Lipid Profile:   HgA1c:   TSH:   PT/INR - ( 02 Aug 2017 22:20 )   PT: 19.9 SEC;   INR: 1.76          PTT - ( 02 Aug 2017 22:20 )  PTT:40.9 SEC  CARDIAC MARKERS:  Troponin T, Serum: 0.12 ng/mL (08-03 @ 02:30)  Troponin T, Serum: 0.09 ng/mL (08-02 @ 22:20)    Creatine Kinase, Serum: 74 u/L (08-03 @ 02:30)  Creatine Kinase, Serum: 155 u/L (08-02 @ 22:20)    CKMB: 2.26 ng/mL (08-03 @ 02:30)  CKMB: 2.28 ng/mL (08-02 @ 22:20)      Blood Gas Venous - Lactate: 3.7 mmol/L (08-03 @ 02:30)  Blood Gas Venous - Lactate: 3.8 mmol/L (08-03 @ 00:40)      CAPILLARY BLOOD GLUCOSE                TELEMETRY: 	    ECG:  	  RADIOLOGY:  OTHER: 	    PREVIOUS DIAGNOSTIC TESTING:    [ ] Echocardiogram:  [ ]  Catheterization:  [ ] Stress Test: HISTORY OF PRESENT ILLNESS:      HPI: 72M hx of   chronic systolic and diastolic dysfunction (EF 20%), LBBB, biventricular ICD, Afib on coumadin, CVA with residual right hemiparesis,temporal arteritis,  presents with chest pain,dizziness and Rt leg pain. Chest pain is intermittent and non radiating. Endorses nausea and vomiting but no diaphoresis.Pt seen by  his cardiologist (Dr. Gilliland) who discontinued Digoxin and started Amiodarone 200mg once daily and decreased coreg to 3.12 x 2, BID since yesterday. In ED right lower extremity doppler (-) DVT, CTA (-) PE, aotic dissection and pericardial effusion. EKG V-paced rhythm no ischemic changes and elevated troponins  Denies CP, palpitations, syncope, SOB, fever, chills    Allergies    No Known Allergies  	    MEDICATIONS:  · 	isosorbide mononitrate 30 mg oral tablet, extended release: 1 tab(s) orally once a day (in the morning)  · 	predniSONE 10 mg oral tablet: 1 tab(s) orally once a day  · 	lisinopril 10 mg oral tablet: 1 tab(s) orally once a day  · 	tamsulosin 0.4 mg oral capsule: 1 cap(s) orally once a day (at bedtime)  · 	mirtazapine 45 mg oral tablet: 1 tab(s) orally once a day (at bedtime)  · 	carvedilol 3.12 mg oral tablet: 1 tab(s) orally every 12 hours  · 	furosemide 40 mg oral tablet: 1 tab(s) orally 2 times a day  · 	famotidine 20 mg oral tablet: 1 tab(s) orally once a day  · 	digoxin 125 mcg (0.125 mg) oral tablet: 1 tab(s) orally once a day  · 	latanoprost 0.005% ophthalmic solution: 1 drop(s) to each affected eye once a day (at bedtime)          · 	Coumadin 3 mg oral tablet: 1 tab(s) orally once a day               Amiodarone 200mg PO QD            PAST MEDICAL & SURGICAL HISTORY:  Temporal arteritis  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  CHF (congestive heart failure): With EF of 20%  Atrial fibrillation  History of hip replacement      FAMILY HISTORY: noncontributory      SOCIAL HISTORY:      Smoker: [x ] Active [ ] never  [ ] previous  Alcohol:  [x ] social [ ] daily [ ] never  Lives with: Wife  Occupation:  retired    REVIEW OF SYSTEMS:  CONSTITUTIONAL: No  fevers or chills  EYES/ENT: No visual changes;  No vertigo or throat pain   NECK: No pain or stiffness  RESPIRATORY: No cough, wheezing, hemoptysis;(+) shortness of breath  CARDIOVASCULAR: No chest pain or palpitations  GASTROINTESTINAL: (+) nausea, vomiting, No abdominal or epigastric pain.  or hematemesis; No diarrhea or constipation. No melena or hematochezia.  GENITOURINARY: No dysuria, frequency or hematuria  NEUROLOGICAL: No numbness   SKIN: No itching, burning, rashes, or lesions   All other review of systems is negative unless indicated above.    PHYSICAL EXAM:  T(C): 37.1 (08-02-17 @ 21:13), Max: 37.1 (08-02-17 @ 21:13)  HR: 60 (08-03-17 @ 02:54) (60 - 94)  BP: 91/64 (08-03-17 @ 02:54) (91/57 - 93/73)  RR: 18 (08-03-17 @ 02:54) (16 - 18)  SpO2: 100% (08-03-17 @ 02:54) (100% - 100%)  Wt(kg): --    Appearance: Normal	  	  Lymphatic: No lymphadenopathy  Cardiovascular: Normal S1 S2, No JVD, No murmurs, No edema  Respiratory: Lungs clear to auscultation	  Psychiatry: A & O x 3, Mood & affect appropriate  Gastrointestinal:  Soft, Non-tender, + BS	  Skin: No rashes, No ecchymoses, No cyanosis	  Neurologic: A&Ox3,   Extremities:  No clubbing, cyanosis (+) edema R > L  Vascular: Peripheral pulses palpable 2+ bilaterally    	 	  LABS:	 	                          11.4   10.88 )-----------( 198      ( 02 Aug 2017 22:20 )             35.1     08-02    133<L>  |  98  |  32<H>  ----------------------------<  149<H>  x    |  18<L>  |  1.65<H>    Ca    8.8      02 Aug 2017 22:20    TPro  6.9  /  Alb  3.4  /  TBili  1.1  /  DBili  x   /  AST  68<H>  /  ALT  27  /  AlkPhos  90  08-02    LIVER FUNCTIONS - ( 02 Aug 2017 22:20 )  Alb: 3.4 g/dL / Pro: 6.9 g/dL / ALK PHOS: 90 u/L / ALT: 27 u/L / AST: 68 u/L / GGT: x             proBNP: Serum Pro-Brain Natriuretic Peptide: 4762 pg/mL (08-03 @ 02:30)    Lipid Profile:   HgA1c:   TSH:   PT/INR - ( 02 Aug 2017 22:20 )   PT: 19.9 SEC;   INR: 1.76          PTT - ( 02 Aug 2017 22:20 )  PTT:40.9 SEC  CARDIAC MARKERS:  Troponin T, Serum: 0.12 ng/mL (08-03 @ 02:30)  Troponin T, Serum: 0.09 ng/mL (08-02 @ 22:20)    Creatine Kinase, Serum: 74 u/L (08-03 @ 02:30)  Creatine Kinase, Serum: 155 u/L (08-02 @ 22:20)    CKMB: 2.26 ng/mL (08-03 @ 02:30)  CKMB: 2.28 ng/mL (08-02 @ 22:20)      Blood Gas Venous - Lactate: 3.7 mmol/L (08-03 @ 02:30)  Blood Gas Venous - Lactate: 3.8 mmol/L (08-03 @ 00:40)   	    ECG:  	  RADIOLOGY:< from: CT Angio Abdomen and Pelvis w/ IV Cont (08.03.17 @ 02:32) >  No aortic dissection.  Limited evaluation of the distal abdominal aorta   and its branches as described.  No central pulmonary embolism.  Bibasilar subsegmental atelectasis.  Nonspecific gallbladder wall edema/pericholecystic fluid.  Further   characterization with ultrasound or HIDA scan may be considered.    < end of copied text >        PREVIOUS DIAGNOSTIC TESTING:    [ ] Echocardiogram:< from: Transesophageal Echocardiogram w/o TTE (06.29.16 @ 17:04) >  1. Tethered mitral valve leaflets with normal opening.  Minimal mitral regurgitation.  2. Mild, nonmobile atherosclerotic plaque noted in the  aortic arch and descending thoracic aorta.  3. Left atrial enlargement. Dense spontaneous echocontrast  seen within the left atrium and left atrial appendage.  There is partial congealing with early clot formation noted  in the left atrial appendage. Decreased left atrial  appendage velocities (18 cm/s).  4. Left ventricular hypertrophy.  5. Severe global left ventricular systolic dysfunction.  6. Right atrial enlargement.  7. Normal right ventricular size with decreased right  ventricular systolic function.    < end of copied text >    [ ]  Catheterization:  [ ] Stress Test:  	  	  ASSESSMENT  72M hx of   chronic systolic and diastolic dysfunction (EF 20%), LBBB, biventricular ICD, Afib on coumadin, CVA with residual right hemiparesis, temporal arteritis,  presents with chest pain,dizziness and Rt leg pain. Chest pain is intermittent and non radiating. Endorses nausea and vomiting but no diaphoresis.Pt seen by  his cardiologist (Dr. Gilliland) who discontinued Digoxin and started Amiodarone 200mg once daily and decreased coreg to 3.12 x 2, BID since yesterday. In ED right lower extremity doppler (-) DVT, CTA (-) PE, aotic dissection and pericardial effusion. EKG V-paced rhythm no ischemic changes and elevated troponins  Denies CP, palpitations, syncope, SOB, fever, chills    PLAN  Patient with elevated troponins in the setting BRYCE  and EKG with V-pace rhythm with no ischemic changes.. Chest pain free.   FEROZ score: 3  Monitor on telemetry  Load with  mg now, Plavix 600 mg PO and start heparin gtt as per ACS protocol  Plan for cath in am  ASA 81 mg qd, Plavix 75mg qd  Start Atorvastatin 80 mg QD,   Introduce beta blocker and acei as tolerated   Serial EKG, PRN chest pain  Admission labs include serial cardiac enzymes, risk factor profile to include TSH, HGBA1c, Lipid profile, CMP, Mag, Phos, CBC, pBNP  Echo to evaluate LV function and wall motion abnormality HISTORY OF PRESENT ILLNESS:      HPI: 72M hx of   chronic systolic and diastolic dysfunction (EF 20%), LBBB, biventricular ICD, Afib on coumadin, CVA with residual right hemiparesis,temporal arteritis,  presents with chest pain,dizziness and Rt leg pain. Chest pain is intermittent and non radiating. Endorses nausea and vomiting but no diaphoresis.Pt seen by  his cardiologist (Dr. Gilliland) who discontinued Digoxin and started Amiodarone 200mg once daily and decreased coreg to 3.12 x 2, BID since yesterday. In ED right lower extremity doppler (-) DVT, CTA (-) PE, aotic dissection and pericardial effusion. EKG V-paced rhythm no ischemic changes and elevated troponins  Denies CP, palpitations, syncope, SOB, fever, chills    Allergies    No Known Allergies  	    MEDICATIONS:  · 	isosorbide mononitrate 30 mg oral tablet, extended release: 1 tab(s) orally once a day (in the morning)  · 	predniSONE 10 mg oral tablet: 1 tab(s) orally once a day  · 	lisinopril 10 mg oral tablet: 1 tab(s) orally once a day  · 	tamsulosin 0.4 mg oral capsule: 1 cap(s) orally once a day (at bedtime)  · 	mirtazapine 45 mg oral tablet: 1 tab(s) orally once a day (at bedtime)  · 	carvedilol 3.12 mg oral tablet: 1 tab(s) orally every 12 hours  · 	furosemide 40 mg oral tablet: 1 tab(s) orally 2 times a day  · 	famotidine 20 mg oral tablet: 1 tab(s) orally once a day  · 	digoxin 125 mcg (0.125 mg) oral tablet: 1 tab(s) orally once a day  · 	latanoprost 0.005% ophthalmic solution: 1 drop(s) to each affected eye once a day (at bedtime)          · 	Coumadin 3 mg oral tablet: 1 tab(s) orally once a day               Amiodarone 200mg PO QD            PAST MEDICAL & SURGICAL HISTORY:  Temporal arteritis  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  CHF (congestive heart failure): With EF of 20%  Atrial fibrillation  History of hip replacement      FAMILY HISTORY: noncontributory      SOCIAL HISTORY:      Smoker: [x ] Active [ ] never  [ ] previous  Alcohol:  [x ] social [ ] daily [ ] never  Lives with: Wife  Occupation:  retired    REVIEW OF SYSTEMS:  CONSTITUTIONAL: No  fevers or chills  EYES/ENT: No visual changes;  No vertigo or throat pain   NECK: No pain or stiffness  RESPIRATORY: No cough, wheezing, hemoptysis;(+) shortness of breath  CARDIOVASCULAR: No chest pain or palpitations  GASTROINTESTINAL: (+) nausea, vomiting, No abdominal or epigastric pain.  or hematemesis; No diarrhea or constipation. No melena or hematochezia.  GENITOURINARY: No dysuria, frequency or hematuria  NEUROLOGICAL: No numbness   SKIN: No itching, burning, rashes, or lesions   All other review of systems is negative unless indicated above.    PHYSICAL EXAM:  T(C): 37.1 (08-02-17 @ 21:13), Max: 37.1 (08-02-17 @ 21:13)  HR: 60 (08-03-17 @ 02:54) (60 - 94)  BP: 91/64 (08-03-17 @ 02:54) (91/57 - 93/73)  RR: 18 (08-03-17 @ 02:54) (16 - 18)  SpO2: 100% (08-03-17 @ 02:54) (100% - 100%)  Wt(kg): --    Appearance: Normal	  	  Lymphatic: No lymphadenopathy  Cardiovascular: Normal S1 S2, No JVD, No murmurs, No edema  Respiratory: Lungs clear to auscultation	  Psychiatry: A & O x 3, Mood & affect appropriate  Gastrointestinal:  Soft, Non-tender, + BS	  Skin: No rashes, No ecchymoses, No cyanosis	  Neurologic: A&Ox3,   Extremities:  No clubbing, cyanosis (+) edema R > L  Vascular: Peripheral pulses palpable 2+ bilaterally    	 	  LABS:	 	                          11.4   10.88 )-----------( 198      ( 02 Aug 2017 22:20 )             35.1     08-02    133<L>  |  98  |  32<H>  ----------------------------<  149<H>  x    |  18<L>  |  1.65<H>    Ca    8.8      02 Aug 2017 22:20    TPro  6.9  /  Alb  3.4  /  TBili  1.1  /  DBili  x   /  AST  68<H>  /  ALT  27  /  AlkPhos  90  08-02    LIVER FUNCTIONS - ( 02 Aug 2017 22:20 )  Alb: 3.4 g/dL / Pro: 6.9 g/dL / ALK PHOS: 90 u/L / ALT: 27 u/L / AST: 68 u/L / GGT: x             proBNP: Serum Pro-Brain Natriuretic Peptide: 4762 pg/mL (08-03 @ 02:30)    Lipid Profile:   HgA1c:   TSH:   PT/INR - ( 02 Aug 2017 22:20 )   PT: 19.9 SEC;   INR: 1.76          PTT - ( 02 Aug 2017 22:20 )  PTT:40.9 SEC  CARDIAC MARKERS:  Troponin T, Serum: 0.12 ng/mL (08-03 @ 02:30)  Troponin T, Serum: 0.09 ng/mL (08-02 @ 22:20)    Creatine Kinase, Serum: 74 u/L (08-03 @ 02:30)  Creatine Kinase, Serum: 155 u/L (08-02 @ 22:20)    CKMB: 2.26 ng/mL (08-03 @ 02:30)  CKMB: 2.28 ng/mL (08-02 @ 22:20)      Blood Gas Venous - Lactate: 3.7 mmol/L (08-03 @ 02:30)  Blood Gas Venous - Lactate: 3.8 mmol/L (08-03 @ 00:40)   	    ECG:  	  RADIOLOGY:< from: CT Angio Abdomen and Pelvis w/ IV Cont (08.03.17 @ 02:32) >  No aortic dissection.  Limited evaluation of the distal abdominal aorta   and its branches as described.  No central pulmonary embolism.  Bibasilar subsegmental atelectasis.  Nonspecific gallbladder wall edema/pericholecystic fluid.  Further   characterization with ultrasound or HIDA scan may be considered.    < end of copied text >        PREVIOUS DIAGNOSTIC TESTING:    [ ] Echocardiogram:< from: Transesophageal Echocardiogram w/o TTE (06.29.16 @ 17:04) >  1. Tethered mitral valve leaflets with normal opening.  Minimal mitral regurgitation.  2. Mild, nonmobile atherosclerotic plaque noted in the  aortic arch and descending thoracic aorta.  3. Left atrial enlargement. Dense spontaneous echocontrast  seen within the left atrium and left atrial appendage.  There is partial congealing with early clot formation noted  in the left atrial appendage. Decreased left atrial  appendage velocities (18 cm/s).  4. Left ventricular hypertrophy.  5. Severe global left ventricular systolic dysfunction.  6. Right atrial enlargement.  7. Normal right ventricular size with decreased right  ventricular systolic function.    < end of copied text >    [ ]  Catheterization:  [ ] Stress Test:  	  	  ASSESSMENT  72M hx of   chronic systolic and diastolic dysfunction (EF 20%), LBBB, biventricular ICD, Afib on coumadin, CVA with residual right hemiparesis, temporal arteritis,  presents with chest pain,dizziness and Rt leg pain. Chest pain is intermittent and non radiating. Endorses nausea and vomiting but no diaphoresis.Pt seen by  his cardiologist (Dr. Gilliland) who discontinued Digoxin and started Amiodarone 200mg once daily and decreased coreg to 3.12 x 2, BID since yesterday. In ED right lower extremity doppler (-) DVT, CTA (-) PE, aotic dissection and pericardial effusion. EKG V-paced rhythm no ischemic changes and elevated troponins  Denies CP, palpitations, syncope, SOB, fever, chills    PLAN  Patient with elevated troponins in the setting BRYCE  and EKG with V-pace rhythm with no ischemic changes.. Chest pain free.   FEROZ score: 3  Monitor on telemetry  Serial EKG, PRN chest pain  Admission labs include serial cardiac enzymes, risk factor profile to include TSH, HGBA1c, Lipid profile, CMP, Mag, Phos, CBC, pBNP  Echo to evaluate LV function and wall motion abnormality  Consider ischemic evaluation HISTORY OF PRESENT ILLNESS:      HPI: 72M hx of   chronic systolic and diastolic dysfunction (EF 20%), LBBB, biventricular ICD, Afib on coumadin, CVA with residual right hemiparesis,temporal arteritis,  presents with chest pain,dizziness and Rt leg pain. Chest pain is intermittent and non radiating. Endorses nausea and vomiting but no diaphoresis.Pt seen by  his cardiologist (Dr. Gilliland) who discontinued Digoxin and started Amiodarone 200mg once daily and decreased coreg to 3.12 x 2, BID since yesterday. In ED right lower extremity doppler (-) DVT, CTA (-) PE, aotic dissection and pericardial effusion. EKG V-paced rhythm no ischemic changes and elevated troponins  Denies CP, palpitations, syncope, SOB, fever, chills    Allergies    No Known Allergies  	    MEDICATIONS:  · 	isosorbide mononitrate 30 mg oral tablet, extended release: 1 tab(s) orally once a day (in the morning)  · 	predniSONE 10 mg oral tablet: 1 tab(s) orally once a day  · 	lisinopril 10 mg oral tablet: 1 tab(s) orally once a day  · 	tamsulosin 0.4 mg oral capsule: 1 cap(s) orally once a day (at bedtime)  · 	mirtazapine 45 mg oral tablet: 1 tab(s) orally once a day (at bedtime)  · 	carvedilol 3.12 mg oral tablet: 1 tab(s) orally every 12 hours  · 	furosemide 40 mg oral tablet: 1 tab(s) orally 2 times a day  · 	famotidine 20 mg oral tablet: 1 tab(s) orally once a day  · 	digoxin 125 mcg (0.125 mg) oral tablet: 1 tab(s) orally once a day  · 	latanoprost 0.005% ophthalmic solution: 1 drop(s) to each affected eye once a day (at bedtime)          · 	Coumadin 3 mg oral tablet: 1 tab(s) orally once a day               Amiodarone 200mg PO QD            PAST MEDICAL & SURGICAL HISTORY:  Temporal arteritis  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  CHF (congestive heart failure): With EF of 20%  Atrial fibrillation  History of hip replacement      FAMILY HISTORY: noncontributory      SOCIAL HISTORY:      Smoker: [x ] Active [ ] never  [ ] previous  Alcohol:  [x ] social [ ] daily [ ] never  Lives with: Wife  Occupation:  retired    REVIEW OF SYSTEMS:  CONSTITUTIONAL: No  fevers or chills  EYES/ENT: No visual changes;  No vertigo or throat pain   NECK: No pain or stiffness  RESPIRATORY: No cough, wheezing, hemoptysis;(+) shortness of breath  CARDIOVASCULAR: No chest pain or palpitations  GASTROINTESTINAL: (+) nausea, vomiting, No abdominal or epigastric pain.  or hematemesis; No diarrhea or constipation. No melena or hematochezia.  GENITOURINARY: No dysuria, frequency or hematuria  NEUROLOGICAL: No numbness   SKIN: No itching, burning, rashes, or lesions   All other review of systems is negative unless indicated above.    PHYSICAL EXAM:  T(C): 37.1 (08-02-17 @ 21:13), Max: 37.1 (08-02-17 @ 21:13)  HR: 60 (08-03-17 @ 02:54) (60 - 94)  BP: 91/64 (08-03-17 @ 02:54) (91/57 - 93/73)  RR: 18 (08-03-17 @ 02:54) (16 - 18)  SpO2: 100% (08-03-17 @ 02:54) (100% - 100%)  Wt(kg): --    Appearance: Normal	  	  Lymphatic: No lymphadenopathy  Cardiovascular: Normal S1 S2, No JVD, No murmurs, No edema  Respiratory: Lungs clear to auscultation	  Psychiatry: A & O x 3, Mood & affect appropriate  Gastrointestinal:  Soft, Non-tender, + BS	  Skin: No rashes, No ecchymoses, No cyanosis	  Neurologic: A&Ox3,   Extremities:  No clubbing, cyanosis (+) edema R > L  Vascular: Peripheral pulses palpable 2+ bilaterally    	 	  LABS:	 	                          11.4   10.88 )-----------( 198      ( 02 Aug 2017 22:20 )             35.1     08-02    133<L>  |  98  |  32<H>  ----------------------------<  149<H>  x    |  18<L>  |  1.65<H>    Ca    8.8      02 Aug 2017 22:20    TPro  6.9  /  Alb  3.4  /  TBili  1.1  /  DBili  x   /  AST  68<H>  /  ALT  27  /  AlkPhos  90  08-02    LIVER FUNCTIONS - ( 02 Aug 2017 22:20 )  Alb: 3.4 g/dL / Pro: 6.9 g/dL / ALK PHOS: 90 u/L / ALT: 27 u/L / AST: 68 u/L / GGT: x             proBNP: Serum Pro-Brain Natriuretic Peptide: 4762 pg/mL (08-03 @ 02:30)    Lipid Profile:   HgA1c:   TSH:   PT/INR - ( 02 Aug 2017 22:20 )   PT: 19.9 SEC;   INR: 1.76          PTT - ( 02 Aug 2017 22:20 )  PTT:40.9 SEC  CARDIAC MARKERS:  Troponin T, Serum: 0.12 ng/mL (08-03 @ 02:30)  Troponin T, Serum: 0.09 ng/mL (08-02 @ 22:20)    Creatine Kinase, Serum: 74 u/L (08-03 @ 02:30)  Creatine Kinase, Serum: 155 u/L (08-02 @ 22:20)    CKMB: 2.26 ng/mL (08-03 @ 02:30)  CKMB: 2.28 ng/mL (08-02 @ 22:20)      Blood Gas Venous - Lactate: 3.7 mmol/L (08-03 @ 02:30)  Blood Gas Venous - Lactate: 3.8 mmol/L (08-03 @ 00:40)   	    ECG:  	  RADIOLOGY:< from: CT Angio Abdomen and Pelvis w/ IV Cont (08.03.17 @ 02:32) >  No aortic dissection.  Limited evaluation of the distal abdominal aorta   and its branches as described.  No central pulmonary embolism.  Bibasilar subsegmental atelectasis.  Nonspecific gallbladder wall edema/pericholecystic fluid.  Further   characterization with ultrasound or HIDA scan may be considered.    < end of copied text >        PREVIOUS DIAGNOSTIC TESTING:    [ ] Echocardiogram:< from: Transesophageal Echocardiogram w/o TTE (06.29.16 @ 17:04) >  1. Tethered mitral valve leaflets with normal opening.  Minimal mitral regurgitation.  2. Mild, nonmobile atherosclerotic plaque noted in the  aortic arch and descending thoracic aorta.  3. Left atrial enlargement. Dense spontaneous echocontrast  seen within the left atrium and left atrial appendage.  There is partial congealing with early clot formation noted  in the left atrial appendage. Decreased left atrial  appendage velocities (18 cm/s).  4. Left ventricular hypertrophy.  5. Severe global left ventricular systolic dysfunction.  6. Right atrial enlargement.  7. Normal right ventricular size with decreased right  ventricular systolic function.    < end of copied text >    [ ]  Catheterization:  [ ] Stress Test:  	  	  ASSESSMENT  72M hx of   chronic systolic and diastolic dysfunction (EF 20%), LBBB, biventricular ICD, Afib on coumadin, CVA with residual right hemiparesis, temporal arteritis,  presents with chest pain,dizziness and Rt leg pain. Chest pain is intermittent and non radiating. Endorses nausea and vomiting but no diaphoresis.Pt seen by  his cardiologist (Dr. Gilliland) who discontinued Digoxin and started Amiodarone 200mg once daily and decreased coreg to 3.12 x 2, BID since yesterday. In ED right lower extremity doppler (-) DVT, CTA (-) PE, aotic dissection and pericardial effusion. EKG V-paced rhythm no ischemic changes and elevated troponins  without CP. R/O ACS    PLAN  Patient with elevated troponins in the setting BRYCE  and EKG with V-pace rhythm with no ischemic changes.. Chest pain free.   FEROZ score: 3  Monitor on telemetry  Serial EKG, PRN chest pain  Admission labs include serial cardiac enzymes, risk factor profile to include TSH, HGBA1c, Lipid profile, CMP, Mag, Phos, CBC, pBNP  Echo to evaluate LV function and wall motion abnormality  Consider ischemic evaluation

## 2017-08-04 DIAGNOSIS — I50.22 CHRONIC SYSTOLIC (CONGESTIVE) HEART FAILURE: ICD-10-CM

## 2017-08-04 DIAGNOSIS — N17.9 ACUTE KIDNEY FAILURE, UNSPECIFIED: ICD-10-CM

## 2017-08-04 LAB
APTT BLD: 40.9 SEC — HIGH (ref 27.5–37.4)
B-OH-BUTYR SERPL-SCNC: 0.1 MMOL/L — SIGNIFICANT CHANGE UP (ref 0–0.4)
BUN SERPL-MCNC: 27 MG/DL — HIGH (ref 7–23)
CALCIUM SERPL-MCNC: 9.6 MG/DL — SIGNIFICANT CHANGE UP (ref 8.4–10.5)
CHLORIDE SERPL-SCNC: 99 MMOL/L — SIGNIFICANT CHANGE UP (ref 98–107)
CHOLEST SERPL-MCNC: 170 MG/DL — SIGNIFICANT CHANGE UP (ref 120–199)
CO2 SERPL-SCNC: 25 MMOL/L — SIGNIFICANT CHANGE UP (ref 22–31)
CREAT SERPL-MCNC: 1.34 MG/DL — HIGH (ref 0.5–1.3)
GLUCOSE SERPL-MCNC: 93 MG/DL — SIGNIFICANT CHANGE UP (ref 70–99)
HBA1C BLD-MCNC: 6.5 % — HIGH (ref 4–5.6)
HCT VFR BLD CALC: 39.4 % — SIGNIFICANT CHANGE UP (ref 39–50)
HDLC SERPL-MCNC: 37 MG/DL — SIGNIFICANT CHANGE UP (ref 35–55)
HGB BLD-MCNC: 13 G/DL — SIGNIFICANT CHANGE UP (ref 13–17)
INR BLD: 1.84 — HIGH (ref 0.88–1.17)
LACTATE SERPL-SCNC: 2.4 MMOL/L — HIGH (ref 0.5–2)
LDH SERPL L TO P-CCNC: 376 U/L — HIGH (ref 135–225)
LIPID PNL WITH DIRECT LDL SERPL: 126 MG/DL — SIGNIFICANT CHANGE UP
MAGNESIUM SERPL-MCNC: 2.1 MG/DL — SIGNIFICANT CHANGE UP (ref 1.6–2.6)
MCHC RBC-ENTMCNC: 33 % — SIGNIFICANT CHANGE UP (ref 32–36)
MCHC RBC-ENTMCNC: 34.6 PG — HIGH (ref 27–34)
MCV RBC AUTO: 104.8 FL — HIGH (ref 80–100)
NRBC # FLD: 0.18 — SIGNIFICANT CHANGE UP
NRBC FLD-RTO: 2.4 — SIGNIFICANT CHANGE UP
PHOSPHATE SERPL-MCNC: 3.1 MG/DL — SIGNIFICANT CHANGE UP (ref 2.5–4.5)
PLATELET # BLD AUTO: 213 K/UL — SIGNIFICANT CHANGE UP (ref 150–400)
PMV BLD: 10.2 FL — SIGNIFICANT CHANGE UP (ref 7–13)
POTASSIUM SERPL-MCNC: 3.5 MMOL/L — SIGNIFICANT CHANGE UP (ref 3.5–5.3)
POTASSIUM SERPL-SCNC: 3.5 MMOL/L — SIGNIFICANT CHANGE UP (ref 3.5–5.3)
PROT SERPL-MCNC: 7.2 G/DL — SIGNIFICANT CHANGE UP (ref 6–8.3)
PROTHROM AB SERPL-ACNC: 20.9 SEC — HIGH (ref 9.8–13.1)
RBC # BLD: 3.76 M/UL — LOW (ref 4.2–5.8)
RBC # FLD: 17 % — HIGH (ref 10.3–14.5)
SODIUM SERPL-SCNC: 141 MMOL/L — SIGNIFICANT CHANGE UP (ref 135–145)
TRIGL SERPL-MCNC: 114 MG/DL — SIGNIFICANT CHANGE UP (ref 10–149)
TSH SERPL-MCNC: 3.79 UIU/ML — SIGNIFICANT CHANGE UP (ref 0.27–4.2)
WBC # BLD: 7.65 K/UL — SIGNIFICANT CHANGE UP (ref 3.8–10.5)
WBC # FLD AUTO: 7.65 K/UL — SIGNIFICANT CHANGE UP (ref 3.8–10.5)

## 2017-08-04 PROCEDURE — 86334 IMMUNOFIX E-PHORESIS SERUM: CPT | Mod: 26

## 2017-08-04 PROCEDURE — 99233 SBSQ HOSP IP/OBS HIGH 50: CPT

## 2017-08-04 PROCEDURE — 84165 PROTEIN E-PHORESIS SERUM: CPT | Mod: 26

## 2017-08-04 RX ORDER — WARFARIN SODIUM 2.5 MG/1
5 TABLET ORAL ONCE
Qty: 0 | Refills: 0 | Status: COMPLETED | OUTPATIENT
Start: 2017-08-04 | End: 2017-08-04

## 2017-08-04 RX ADMIN — WARFARIN SODIUM 5 MILLIGRAM(S): 2.5 TABLET ORAL at 17:41

## 2017-08-04 RX ADMIN — ISOSORBIDE MONONITRATE 30 MILLIGRAM(S): 60 TABLET, EXTENDED RELEASE ORAL at 13:01

## 2017-08-04 RX ADMIN — Medication 81 MILLIGRAM(S): at 13:02

## 2017-08-04 RX ADMIN — MIRTAZAPINE 45 MILLIGRAM(S): 45 TABLET, ORALLY DISINTEGRATING ORAL at 00:58

## 2017-08-04 RX ADMIN — ATORVASTATIN CALCIUM 20 MILLIGRAM(S): 80 TABLET, FILM COATED ORAL at 00:57

## 2017-08-04 RX ADMIN — LATANOPROST 1 DROP(S): 0.05 SOLUTION/ DROPS OPHTHALMIC; TOPICAL at 21:53

## 2017-08-04 RX ADMIN — LATANOPROST 1 DROP(S): 0.05 SOLUTION/ DROPS OPHTHALMIC; TOPICAL at 00:58

## 2017-08-04 RX ADMIN — MIRTAZAPINE 45 MILLIGRAM(S): 45 TABLET, ORALLY DISINTEGRATING ORAL at 21:53

## 2017-08-04 RX ADMIN — ATORVASTATIN CALCIUM 20 MILLIGRAM(S): 80 TABLET, FILM COATED ORAL at 21:53

## 2017-08-04 NOTE — PROGRESS NOTE ADULT - PROBLEM SELECTOR PLAN 4
con't home meds con't flomax Patient with ischemic cardiomyopathy s/p AICD placement  - continue home regimen of isosorbide mononitrate, lisinopril, and carvedilol  - continue atorvastatin

## 2017-08-04 NOTE — PROGRESS NOTE ADULT - PROBLEM SELECTOR PLAN 2
rate control, con't coumadin, monitor inr Creatinine improved with IV fluids. Prerenal etiology. Nephrology consulted, appreciate recs  - trend Cr  - renally dose meds

## 2017-08-04 NOTE — PROGRESS NOTE ADULT - SUBJECTIVE AND OBJECTIVE BOX
No pain, no shortness of breath      VITAL:  T(C): , Max: 36.8 (17 @ 00:48)  T(F): , Max: 98.2 (17 @ 00:48)  HR: 103 (17 @ 12:59)  BP: 139/84 (17 @ 12:59)  BP(mean): --  RR: 18 (17 @ 12:59)  SpO2: 99% (17 @ 12:59)        LABS/IMAGIN.0   7.65  )-----------( 213      ( 04 Aug 2017 04:30 )             39.4     Na(141)/K(3.5)/Cl(99)/HCO3(25)/BUN(27)/Cr(1.34)Glu(93)/Ca(9.6)/Mg(2.1)/PO4(3.1)     @ 06:07  Na(133)/K(--)/Cl(98)/HCO3(18)/BUN(32)/Cr(1.65)Glu(149)/Ca(8.8)/Mg(--)/PO4(--)     @ 22:20    ABIs (8/3/17) - (+)likely small vessel arterial disease of the right foot, otherwise unremarkable    IMPRESSION: 72M w/ AFib, temporal arteritis, and HFrEF (20%), 8/3/17 a/w chest pain and BRYCE.    (1)Renal - BRYCE on CKD - prerenal - resolving.     (2)Metabolic acidosis - BRYCE-associated? Improved as of today.    (3)Chest pain - low suspicion for ACS per Cardiology. Awaiting TTE    (4)Vascular - likely RLE small vessel disease based on ABIs       RECOMMEND:  (1)Dose new meds for GFR 40-50ml/min  (2)Follow up TTE  (3)If to require cardiac cath and/or LE angiogram, would look to give mucomyst 1200mg po bid x 4 doses as well as 1/2NS + 75meq/L NaHCO3 periprocedurally (250 cc pre-cath and 125cc/h x 4 hours post-cath)          Lacho Ashton MD  [facility]  [phone] (+)mild chest pain and RLE pain    VITAL:  T(C): , Max: 36.8 (17 @ 00:48)  T(F): , Max: 98.2 (17 @ 00:48)  HR: 103 (17 @ 12:59)  BP: 139/84 (17 @ 12:59)  BP(mean): --  RR: 18 (17 @ 12:59)  SpO2: 99% (17 @ 12:59)        LABS/IMAGIN.0   7.65  )-----------( 213      ( 04 Aug 2017 04:30 )             39.4     Na(141)/K(3.5)/Cl(99)/HCO3(25)/BUN(27)/Cr(1.34)Glu(93)/Ca(9.6)/Mg(2.1)/PO4(3.1)     @ 06:07  Na(133)/K(--)/Cl(98)/HCO3(18)/BUN(32)/Cr(1.65)Glu(149)/Ca(8.8)/Mg(--)/PO4(--)     @ 22:20    ABIs (8/3/17) - (+)likely small vessel arterial disease of the right foot, otherwise unremarkable    IMPRESSION: 72M w/ AFib, temporal arteritis, and HFrEF (20%), 8/3/17 a/w chest pain and BRYCE.    (1)Renal - BRYCE on CKD - prerenal - resolving.     (2)Metabolic acidosis - BRYCE-associated? Improved as of today.    (3)Chest pain - low suspicion for ACS per Cardiology. Awaiting TTE    (4)Vascular - likely RLE small vessel disease based on ABIs       RECOMMEND:  (1)Dose new meds for GFR 40-50ml/min  (2)Follow up TTE  (3)If to require cardiac cath and/or LE angiogram, would look to give mucomyst 1200mg po bid x 4 doses as well as 1/2NS + 75meq/L NaHCO3 periprocedurally (250 cc pre-cath and 125cc/h x 4 hours post-cath)          Lacho Ashton MD  [facility]  [phone]

## 2017-08-04 NOTE — PROGRESS NOTE ADULT - SUBJECTIVE AND OBJECTIVE BOX
Patient is a 72y old  Male who presents with a chief complaint of Chest pain (03 Aug 2017 15:51)        SUBJECTIVE / OVERNIGHT EVENTS:      MEDICATIONS  (STANDING):  amiodarone    Tablet 400 milliGRAM(s) Oral daily  carvedilol 3.125 milliGRAM(s) Oral every 12 hours  isosorbide   mononitrate ER Tablet (IMDUR) 30 milliGRAM(s) Oral daily  aspirin  chewable 81 milliGRAM(s) Oral daily  predniSONE   Tablet 10 milliGRAM(s) Oral daily  pantoprazole    Tablet 40 milliGRAM(s) Oral before breakfast  mirtazapine 45 milliGRAM(s) Oral at bedtime  atorvastatin 20 milliGRAM(s) Oral at bedtime  latanoprost 0.005% Ophthalmic Solution 1 Drop(s) Both EYES at bedtime  warfarin 5 milliGRAM(s) Oral once    MEDICATIONS  (PRN):  acetaminophen   Tablet. 650 milliGRAM(s) Oral every 6 hours PRN mild, moderate pain      Vital Signs Last 24 Hrs  T(C): 36.7 (04 Aug 2017 12:59), Max: 36.8 (04 Aug 2017 00:48)  T(F): 98 (04 Aug 2017 12:59), Max: 98.2 (04 Aug 2017 00:48)  HR: 103 (04 Aug 2017 12:59) (86 - 114)  BP: 139/84 (04 Aug 2017 12:59) (99/60 - 139/84)  BP(mean): --  RR: 18 (04 Aug 2017 12:59) (17 - 18)  SpO2: 99% (04 Aug 2017 12:59) (99% - 100%)  CAPILLARY BLOOD GLUCOSE        I&O's Summary        PHYSICAL EXAM  GENERAL: NAD, well-developed  HEAD:  Atraumatic, Normocephalic  EYES: EOMI, PERRLA, conjunctiva and sclera clear  NECK: Supple, No JVD  CHEST/LUNG: Clear to auscultation bilaterally; No wheeze  HEART: Regular rate and rhythm; No murmurs, rubs, or gallops  ABDOMEN: Soft, Nontender, Nondistended; Bowel sounds present  EXTREMITIES:  2+ Peripheral Pulses, No clubbing, cyanosis, or edema  PSYCH: AAOx3  SKIN: No rashes or lesions    LABS:                        13.0   7.65  )-----------( 213      ( 04 Aug 2017 04:30 )             39.4     08-04    141  |  99  |  27<H>  ----------------------------<  93  3.5   |  25  |  1.34<H>    Ca    9.6      04 Aug 2017 06:07  Phos  3.1     08-04  Mg     2.1     08-04    TPro  7.2  /  Alb  x   /  TBili  x   /  DBili  x   /  AST  x   /  ALT  x   /  AlkPhos  x   08-04    PT/INR - ( 04 Aug 2017 06:00 )   PT: 20.9 SEC;   INR: 1.84          PTT - ( 04 Aug 2017 06:00 )  PTT:40.9 SEC  CARDIAC MARKERS ( 03 Aug 2017 15:15 )  x     / 0.10 ng/mL / 70 u/L / x     / x      CARDIAC MARKERS ( 03 Aug 2017 02:30 )  x     / 0.12 ng/mL / 74 u/L / 2.26 ng/mL / x      CARDIAC MARKERS ( 02 Aug 2017 22:20 )  x     / 0.09 ng/mL / 155 u/L / 2.28 ng/mL / x            RADIOLOGY & ADDITIONAL TESTS:    Imaging Personally Reviewed:  Consultant(s) Notes Reviewed:    Care Discussed with Consultants/Other Providers: CC: Chest pain and R leg pain    SUBJECTIVE / OVERNIGHT EVENTS:  Patient seen and evaluated earlier today. Chest pain resolved spontaneously as well as R leg pain. Mentions discomfort around R temporal region of head. No HA, dizziness, diaphoresis. Tolerates PO w/o n/v.    MEDICATIONS  (STANDING):  amiodarone    Tablet 400 milliGRAM(s) Oral daily  carvedilol 3.125 milliGRAM(s) Oral every 12 hours  isosorbide   mononitrate ER Tablet (IMDUR) 30 milliGRAM(s) Oral daily  aspirin  chewable 81 milliGRAM(s) Oral daily  predniSONE   Tablet 10 milliGRAM(s) Oral daily  pantoprazole    Tablet 40 milliGRAM(s) Oral before breakfast  mirtazapine 45 milliGRAM(s) Oral at bedtime  atorvastatin 20 milliGRAM(s) Oral at bedtime  latanoprost 0.005% Ophthalmic Solution 1 Drop(s) Both EYES at bedtime  warfarin 5 milliGRAM(s) Oral once    MEDICATIONS  (PRN):  acetaminophen   Tablet. 650 milliGRAM(s) Oral every 6 hours PRN mild, moderate pain      Vital Signs Last 24 Hrs  T(C): 36.7 (04 Aug 2017 12:59), Max: 36.8 (04 Aug 2017 00:48)  T(F): 98 (04 Aug 2017 12:59), Max: 98.2 (04 Aug 2017 00:48)  HR: 103 (04 Aug 2017 12:59) (86 - 114)  BP: 139/84 (04 Aug 2017 12:59) (99/60 - 139/84)  BP(mean): --  RR: 18 (04 Aug 2017 12:59) (17 - 18)  SpO2: 99% (04 Aug 2017 12:59) (99% - 100%)  CAPILLARY BLOOD GLUCOSE        I&O's Summary        PHYSICAL EXAM  GENERAL: Elderly man in NAD  HEAD:  Atraumatic, Normocephalic. TTP along R temporal region.  EYES: EOMI, PERRL, conjunctiva and sclera clear  NECK: Supple, No JVD  CHEST/LUNG: Clear to auscultation bilaterally; No wheeze  HEART: Irregularly irregular; +systolic murmur  ABDOMEN: Soft, Nontender, Nondistended; Bowel sounds present  EXTREMITIES:  2+ Peripheral Pulses, No clubbing, cyanosis. Mild edema along R ankle and foot. Contracted RUE in flexed position.  NEURO: alert. Expressive aphasia. Tongue deviation to the right. Remainder of cranial nerves grossly intact. R facial droop. Sensation to light touch intact. Paresis of RLE. Full ROM of LUE and LLE  SKIN: No rashes or lesions    LABS:                        13.0   7.65  )-----------( 213      ( 04 Aug 2017 04:30 )             39.4     08-04    141  |  99  |  27<H>  ----------------------------<  93  3.5   |  25  |  1.34<H>    Ca    9.6      04 Aug 2017 06:07  Phos  3.1     08-04  Mg     2.1     08-04    TPro  7.2  /  Alb  x   /  TBili  x   /  DBili  x   /  AST  x   /  ALT  x   /  AlkPhos  x   08-04    PT/INR - ( 04 Aug 2017 06:00 )   PT: 20.9 SEC;   INR: 1.84          PTT - ( 04 Aug 2017 06:00 )  PTT:40.9 SEC  CARDIAC MARKERS ( 03 Aug 2017 15:15 )  x     / 0.10 ng/mL / 70 u/L / x     / x      CARDIAC MARKERS ( 03 Aug 2017 02:30 )  x     / 0.12 ng/mL / 74 u/L / 2.26 ng/mL / x      CARDIAC MARKERS ( 02 Aug 2017 22:20 )  x     / 0.09 ng/mL / 155 u/L / 2.28 ng/mL / x            RADIOLOGY & ADDITIONAL TESTS:    Consultant(s) Notes Reviewed:  Yes  Care Discussed with Consultants/Other Providers: Yes

## 2017-08-04 NOTE — PROGRESS NOTE ADULT - ASSESSMENT
71 y/o male, with a PmHx of CHF with an EF of 20%, AICD, Prostate Ca s/p seed implants, Afib on ac, CVA with residual right hemiparesis, is being admitted to telemetry for r/o acs.

## 2017-08-04 NOTE — PROGRESS NOTE ADULT - PROBLEM SELECTOR PLAN 3
con't flomax Subtherapeutic INR. Patient recently started on amiodarone which is known to increase effects of warfarin so will closely monitor  - loading dose of amiodarone 400mg daily  - warfarin 5mg daily  - daily INR  - HR goal <110

## 2017-08-04 NOTE — PROGRESS NOTE ADULT - PROBLEM SELECTOR PLAN 1
ekg/telemetry, house cards c/s, f/u ce x 2, recent positive stress test, consider angiogram Unclear etiology. Not related to food and patient not able to express precipitating factors. 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 Normal CK. ACS r/o. However, as per HPI note, reported recent history of positive stress test. However, unsure if patient is a good candidate for invasive cardiac w/u.  - pending repeat TTE. If unchanged compared to prior, pt to follow-up with outpatient cardiologist  - will need to consult EP tomorrow to interrogate AICD.  -

## 2017-08-04 NOTE — PROGRESS NOTE ADULT - PROBLEM SELECTOR PROBLEM 2
Atrial fibrillation Acute renal failure superimposed on stage 3 chronic kidney disease, unspecified acute renal failure type

## 2017-08-04 NOTE — PROGRESS NOTE ADULT - SUBJECTIVE AND OBJECTIVE BOX
Patient is a 72y old  Male who presents with a chief complaint of Chest pain (03 Aug 2017 15:51)      SUBJECTIVE / OVERNIGHT EVENTS:  Patient seen and examined at bedside. No acute events overnight. Patient denies CP, SOB, abdominal pain, palpitations. Resting comfortably in bed.     MEDICATIONS  (STANDING):  amiodarone    Tablet 400 milliGRAM(s) Oral daily  carvedilol 3.125 milliGRAM(s) Oral every 12 hours  isosorbide   mononitrate ER Tablet (IMDUR) 30 milliGRAM(s) Oral daily  aspirin  chewable 81 milliGRAM(s) Oral daily  predniSONE   Tablet 10 milliGRAM(s) Oral daily  pantoprazole    Tablet 40 milliGRAM(s) Oral before breakfast  mirtazapine 45 milliGRAM(s) Oral at bedtime  atorvastatin 20 milliGRAM(s) Oral at bedtime  latanoprost 0.005% Ophthalmic Solution 1 Drop(s) Both EYES at bedtime  warfarin 5 milliGRAM(s) Oral once    MEDICATIONS  (PRN):  acetaminophen   Tablet. 650 milliGRAM(s) Oral every 6 hours PRN mild, moderate pain        CAPILLARY BLOOD GLUCOSE        I&O's Summary      PHYSICAL EXAM:  GENERAL: NAD  CHEST/LUNG: Clear to auscultation bilaterally; No wheeze  HEART: RRR no MRG, +JVD  ABDOMEN: Soft, Nontender, Nondistended; Bowel sounds present  PSYCH: AAOx3      LABS:                        13.0   7.65  )-----------( 213      ( 04 Aug 2017 04:30 )             39.4     WBC Trend: 7.65<--, 10.88<--  08-04    141  |  99  |  27<H>  ----------------------------<  93  3.5   |  25  |  1.34<H>    Ca    9.6      04 Aug 2017 06:07  Phos  3.1     08-04  Mg     2.1     08-04    TPro  7.2  /  Alb  x   /  TBili  x   /  DBili  x   /  AST  x   /  ALT  x   /  AlkPhos  x   08-04    Creatinine Trend: 1.34<--, 1.65<--  PT/INR - ( 04 Aug 2017 06:00 )   PT: 20.9 SEC;   INR: 1.84          PTT - ( 04 Aug 2017 06:00 )  PTT:40.9 SEC  CARDIAC MARKERS ( 03 Aug 2017 15:15 )  x     / 0.10 ng/mL / 70 u/L / x     / x      CARDIAC MARKERS ( 03 Aug 2017 02:30 )  x     / 0.12 ng/mL / 74 u/L / 2.26 ng/mL / x      CARDIAC MARKERS ( 02 Aug 2017 22:20 )  x     / 0.09 ng/mL / 155 u/L / 2.28 ng/mL / x                RADIOLOGY & ADDITIONAL TESTS:    Imaging Personally Reviewed:    Consultant(s) Notes Reviewed:      Care Discussed with Consultants/Other Providers:

## 2017-08-04 NOTE — PROGRESS NOTE ADULT - ASSESSMENT
72M hx of   chronic systolic and diastolic dysfunction (EF 20%), LBBB, biventricular ICD, Afib on coumadin, CVA with residual right hemiparesis, temporal arteritis,  presents with chest pain, dizziness and Rt leg pain. Chest pain is intermittent and non radiating. Endorses nausea and vomiting but no diaphoresis. Pt seen by  his cardiologist (Dr. Gilliland) who discontinued Digoxin and started Amiodarone 200mg once daily and decreased coreg to 3.12 x 2, BID since yesterday. In ED right lower extremity doppler (-) DVT, CTA (-) PE, aotic dissection and pericardial effusion. EKG V-paced rhythm no ischemic changes and elevated troponins  without CP.     Low suspicion for ACS based on EKG showing no ischemic changes, lack of signs+symptoms suggesting ACS. Troponin's elevated x 3 however in setting of BRYCE.     Problem: Rule out ACS  Plan: serial EKGs, trend cardiac enzymes, f/u echo

## 2017-08-05 LAB
APTT BLD: 42.4 SEC — HIGH (ref 27.5–37.4)
BUN SERPL-MCNC: 19 MG/DL — SIGNIFICANT CHANGE UP (ref 7–23)
CALCIUM SERPL-MCNC: 9.5 MG/DL — SIGNIFICANT CHANGE UP (ref 8.4–10.5)
CHLORIDE SERPL-SCNC: 103 MMOL/L — SIGNIFICANT CHANGE UP (ref 98–107)
CO2 SERPL-SCNC: 25 MMOL/L — SIGNIFICANT CHANGE UP (ref 22–31)
CREAT SERPL-MCNC: 1.11 MG/DL — SIGNIFICANT CHANGE UP (ref 0.5–1.3)
GLUCOSE SERPL-MCNC: 103 MG/DL — HIGH (ref 70–99)
HCT VFR BLD CALC: 36.4 % — LOW (ref 39–50)
HGB BLD-MCNC: 11.9 G/DL — LOW (ref 13–17)
INR BLD: 2.47 — HIGH (ref 0.88–1.17)
MAGNESIUM SERPL-MCNC: 2.1 MG/DL — SIGNIFICANT CHANGE UP (ref 1.6–2.6)
MCHC RBC-ENTMCNC: 32.7 % — SIGNIFICANT CHANGE UP (ref 32–36)
MCHC RBC-ENTMCNC: 34.5 PG — HIGH (ref 27–34)
MCV RBC AUTO: 105.5 FL — HIGH (ref 80–100)
NRBC # FLD: 0.07 — SIGNIFICANT CHANGE UP
NRBC FLD-RTO: 1 — SIGNIFICANT CHANGE UP
PLATELET # BLD AUTO: 239 K/UL — SIGNIFICANT CHANGE UP (ref 150–400)
PMV BLD: 10.1 FL — SIGNIFICANT CHANGE UP (ref 7–13)
POTASSIUM SERPL-MCNC: 3.6 MMOL/L — SIGNIFICANT CHANGE UP (ref 3.5–5.3)
POTASSIUM SERPL-SCNC: 3.6 MMOL/L — SIGNIFICANT CHANGE UP (ref 3.5–5.3)
PROTHROM AB SERPL-ACNC: 28.2 SEC — HIGH (ref 9.8–13.1)
RBC # BLD: 3.45 M/UL — LOW (ref 4.2–5.8)
RBC # FLD: 17.2 % — HIGH (ref 10.3–14.5)
SODIUM SERPL-SCNC: 143 MMOL/L — SIGNIFICANT CHANGE UP (ref 135–145)
WBC # BLD: 6.7 K/UL — SIGNIFICANT CHANGE UP (ref 3.8–10.5)
WBC # FLD AUTO: 6.7 K/UL — SIGNIFICANT CHANGE UP (ref 3.8–10.5)

## 2017-08-05 PROCEDURE — 99233 SBSQ HOSP IP/OBS HIGH 50: CPT

## 2017-08-05 RX ORDER — POTASSIUM CHLORIDE 20 MEQ
40 PACKET (EA) ORAL ONCE
Qty: 0 | Refills: 0 | Status: COMPLETED | OUTPATIENT
Start: 2017-08-05 | End: 2017-08-05

## 2017-08-05 RX ORDER — WARFARIN SODIUM 2.5 MG/1
5 TABLET ORAL ONCE
Qty: 0 | Refills: 0 | Status: COMPLETED | OUTPATIENT
Start: 2017-08-05 | End: 2017-08-05

## 2017-08-05 RX ADMIN — Medication 40 MILLIEQUIVALENT(S): at 05:45

## 2017-08-05 RX ADMIN — Medication 81 MILLIGRAM(S): at 12:26

## 2017-08-05 RX ADMIN — MIRTAZAPINE 45 MILLIGRAM(S): 45 TABLET, ORALLY DISINTEGRATING ORAL at 21:48

## 2017-08-05 RX ADMIN — CARVEDILOL PHOSPHATE 3.12 MILLIGRAM(S): 80 CAPSULE, EXTENDED RELEASE ORAL at 04:25

## 2017-08-05 RX ADMIN — PANTOPRAZOLE SODIUM 40 MILLIGRAM(S): 20 TABLET, DELAYED RELEASE ORAL at 05:45

## 2017-08-05 RX ADMIN — LATANOPROST 1 DROP(S): 0.05 SOLUTION/ DROPS OPHTHALMIC; TOPICAL at 21:47

## 2017-08-05 RX ADMIN — WARFARIN SODIUM 5 MILLIGRAM(S): 2.5 TABLET ORAL at 17:31

## 2017-08-05 RX ADMIN — Medication 10 MILLIGRAM(S): at 05:45

## 2017-08-05 RX ADMIN — ATORVASTATIN CALCIUM 20 MILLIGRAM(S): 80 TABLET, FILM COATED ORAL at 21:48

## 2017-08-05 RX ADMIN — AMIODARONE HYDROCHLORIDE 400 MILLIGRAM(S): 400 TABLET ORAL at 04:25

## 2017-08-05 NOTE — PROGRESS NOTE ADULT - PROBLEM SELECTOR PLAN 4
Patient with ischemic cardiomyopathy s/p AICD placement  - continue home regimen of isosorbide mononitrate, lisinopril, and carvedilol  - continue atorvastatin

## 2017-08-05 NOTE — PROGRESS NOTE ADULT - PROBLEM SELECTOR PLAN 2
Creatinine improved with IV fluids. Prerenal etiology. Nephrology consulted, appreciate recs  - trend Cr  - renally dose meds

## 2017-08-05 NOTE — PROGRESS NOTE ADULT - SUBJECTIVE AND OBJECTIVE BOX
Patient is a 72y old  Male who presents with a chief complaint of Chest pain (03 Aug 2017 15:51)      SUBJECTIVE / OVERNIGHT EVENTS:    MEDICATIONS  (STANDING):  amiodarone    Tablet 400 milliGRAM(s) Oral daily  carvedilol 3.125 milliGRAM(s) Oral every 12 hours  isosorbide   mononitrate ER Tablet (IMDUR) 30 milliGRAM(s) Oral daily  aspirin  chewable 81 milliGRAM(s) Oral daily  predniSONE   Tablet 10 milliGRAM(s) Oral daily  pantoprazole    Tablet 40 milliGRAM(s) Oral before breakfast  mirtazapine 45 milliGRAM(s) Oral at bedtime  atorvastatin 20 milliGRAM(s) Oral at bedtime  latanoprost 0.005% Ophthalmic Solution 1 Drop(s) Both EYES at bedtime    MEDICATIONS  (PRN):  acetaminophen   Tablet. 650 milliGRAM(s) Oral every 6 hours PRN mild, moderate pain      Vital Signs Last 24 Hrs  T(C): 36.9 (05 Aug 2017 11:10), Max: 37.6 (04 Aug 2017 17:05)  T(F): 98.5 (05 Aug 2017 11:10), Max: 99.6 (04 Aug 2017 17:05)  HR: 68 (05 Aug 2017 11:10) (68 - 130)  BP: 96/65 (05 Aug 2017 11:10) (96/57 - 120/84)  BP(mean): 76 (05 Aug 2017 11:10) (76 - 84)  RR: 16 (05 Aug 2017 11:10) (16 - 18)  SpO2: 98% (05 Aug 2017 11:10) (98% - 100%)  CAPILLARY BLOOD GLUCOSE        I&O's Summary    05 Aug 2017 07:01  -  05 Aug 2017 15:01  --------------------------------------------------------  IN: 240 mL / OUT: 0 mL / NET: 240 mL        PHYSICAL EXAM:  GENERAL: NAD, well-developed  HEAD:  Atraumatic, Normocephalic  EYES: EOMI, PERRLA, conjunctiva and sclera clear  NECK: Supple, No JVD  CHEST/LUNG: Clear to auscultation bilaterally; No wheeze  HEART: Regular rate and rhythm; No murmurs, rubs, or gallops  ABDOMEN: Soft, Nontender, Nondistended; Bowel sounds present  EXTREMITIES:  2+ Peripheral Pulses, No clubbing, cyanosis, or edema  PSYCH: AAOx3  NEUROLOGY: non-focal  SKIN: No rashes or lesions    LABS:                        11.9   6.70  )-----------( 239      ( 05 Aug 2017 04:17 )             36.4     08-05    143  |  103  |  19  ----------------------------<  103<H>  3.6   |  25  |  1.11    Ca    9.5      05 Aug 2017 04:17  Phos  3.1     08-04  Mg     2.1     08-05    TPro  7.2  /  Alb  x   /  TBili  x   /  DBili  x   /  AST  x   /  ALT  x   /  AlkPhos  x   08-04    PT/INR - ( 05 Aug 2017 04:17 )   PT: 28.2 SEC;   INR: 2.47          PTT - ( 05 Aug 2017 04:17 )  PTT:42.4 SEC  CARDIAC MARKERS ( 03 Aug 2017 15:15 )  x     / 0.10 ng/mL / 70 u/L / x     / x              RADIOLOGY & ADDITIONAL TESTS:    Imaging Personally Reviewed:    Consultant(s) Notes Reviewed:      Care Discussed with Consultants/Other Providers: Patient is a 72y old  Male who presents with a chief complaint of Chest pain (03 Aug 2017 15:51)      SUBJECTIVE / OVERNIGHT EVENTS: patient seen and examined by bedside denies headache, dizziness, SOB, CP, Palpitations N/V/D, abdominal pain, pt noted to have wide complex tachy in am ,pt was asymptomatic at the time         MEDICATIONS  (STANDING):  amiodarone    Tablet 400 milliGRAM(s) Oral daily  carvedilol 3.125 milliGRAM(s) Oral every 12 hours  isosorbide   mononitrate ER Tablet (IMDUR) 30 milliGRAM(s) Oral daily  aspirin  chewable 81 milliGRAM(s) Oral daily  predniSONE   Tablet 10 milliGRAM(s) Oral daily  pantoprazole    Tablet 40 milliGRAM(s) Oral before breakfast  mirtazapine 45 milliGRAM(s) Oral at bedtime  atorvastatin 20 milliGRAM(s) Oral at bedtime  latanoprost 0.005% Ophthalmic Solution 1 Drop(s) Both EYES at bedtime    MEDICATIONS  (PRN):  acetaminophen   Tablet. 650 milliGRAM(s) Oral every 6 hours PRN mild, moderate pain      Vital Signs Last 24 Hrs  T(C): 36.9 (05 Aug 2017 11:10), Max: 37.6 (04 Aug 2017 17:05)  T(F): 98.5 (05 Aug 2017 11:10), Max: 99.6 (04 Aug 2017 17:05)  HR: 68 (05 Aug 2017 11:10) (68 - 130)  BP: 96/65 (05 Aug 2017 11:10) (96/57 - 120/84)  BP(mean): 76 (05 Aug 2017 11:10) (76 - 84)  RR: 16 (05 Aug 2017 11:10) (16 - 18)  SpO2: 98% (05 Aug 2017 11:10) (98% - 100%)  CAPILLARY BLOOD GLUCOSE        I&O's Summary    05 Aug 2017 07:01  -  05 Aug 2017 15:01  --------------------------------------------------------  IN: 240 mL / OUT: 0 mL / NET: 240 mL        PHYSICAL EXAM:  GENERAL: NAD,   HEAD:  Atraumatic, Normocephalic  EYES: EOMI, PERRLA, conjunctiva and sclera clear  NECK: Supple,   CHEST/LUNG: Clear to auscultation bilaterally; No wheeze  HEART: Irregular rate and rhythm, systolic murmur +  ABDOMEN: Soft, Nontender, Nondistended; Bowel sounds present  EXTREMITIES:   No clubbing, cyanosis, edema +,Contracted RUE  PSYCH: AAOx2-3  NEUROLOGY: rt hemiparesis   SKIN: No rashes or lesions    LABS:                        11.9   6.70  )-----------( 239      ( 05 Aug 2017 04:17 )             36.4     08-05    143  |  103  |  19  ----------------------------<  103<H>  3.6   |  25  |  1.11    Ca    9.5      05 Aug 2017 04:17  Phos  3.1     08-04  Mg     2.1     08-05    TPro  7.2  /  Alb  x   /  TBili  x   /  DBili  x   /  AST  x   /  ALT  x   /  AlkPhos  x   08-04    PT/INR - ( 05 Aug 2017 04:17 )   PT: 28.2 SEC;   INR: 2.47          PTT - ( 05 Aug 2017 04:17 )  PTT:42.4 SEC  CARDIAC MARKERS ( 03 Aug 2017 15:15 )  x     / 0.10 ng/mL / 70 u/L / x     / x              RADIOLOGY & ADDITIONAL TESTS:    Imaging Personally Reviewed:    Consultant(s) Notes Reviewed:      Care Discussed with Consultants/Other Providers:

## 2017-08-05 NOTE — PROGRESS NOTE ADULT - PROBLEM SELECTOR PLAN 3
Subtherapeutic INR. Patient recently started on amiodarone which is known to increase effects of warfarin so will closely monitor  - loading dose of amiodarone 400mg daily  - warfarin 5mg daily  - daily INR  - HR goal <110 therapeutic INR. Patient recently started on amiodarone which is known to increase effects of warfarin so will closely monitor  - loading dose of amiodarone 400mg daily  - warfarin 5mg daily  - daily INR  - HR goal <110

## 2017-08-05 NOTE — PROGRESS NOTE ADULT - SUBJECTIVE AND OBJECTIVE BOX
Patient seen and examined sitting in chair with no new complaints.  NAD noted.  Patient breathing evenly & non-labored.  Denies CP, SOB, N/V.    REVIEW OF SYSTEMS:  As per HPI, otherwise 8 full 10 ROS were unremarkable    MEDICATIONS  (STANDING):  amiodarone    Tablet 400 milliGRAM(s) Oral daily  carvedilol 3.125 milliGRAM(s) Oral every 12 hours  isosorbide   mononitrate ER Tablet (IMDUR) 30 milliGRAM(s) Oral daily  aspirin  chewable 81 milliGRAM(s) Oral daily  predniSONE   Tablet 10 milliGRAM(s) Oral daily  pantoprazole    Tablet 40 milliGRAM(s) Oral before breakfast  mirtazapine 45 milliGRAM(s) Oral at bedtime  atorvastatin 20 milliGRAM(s) Oral at bedtime  latanoprost 0.005% Ophthalmic Solution 1 Drop(s) Both EYES at bedtime      VITAL:  T(C): , Max: 37.6 (08-04-17 @ 17:05)  T(F): , Max: 99.6 (08-04-17 @ 17:05)  HR: 68 (08-05-17 @ 11:10)  BP: 96/65 (08-05-17 @ 11:10)  BP(mean): 76 (08-05-17 @ 11:10)  RR: 16 (08-05-17 @ 11:10)  SpO2: 98% (08-05-17 @ 11:10)  Wt(kg): --    I and O's:    08-05 @ 07:01  -  08-05 @ 14:45  --------------------------------------------------------  IN: 240 mL / OUT: 0 mL / NET: 240 mL          PHYSICAL EXAM:    Constitutional: NAD  HEENT: PERRLA, EOMI,  MMM  Neck: No LAD, No JVD  Respiratory: CTAB  Cardiovascular: S1 and S2  Gastrointestinal: BS+, soft, NT/ND  Extremities: No peripheral edema  Neurological: A/O x 3, no focal deficits  Psychiatric: Normal mood, normal affect  : No Carolina  Skin: No rashes  Access: Not applicable    LABS:                        11.9   6.70  )-----------( 239      ( 05 Aug 2017 04:17 )             36.4     08-05    143  |  103  |  19  ----------------------------<  103<H>  3.6   |  25  |  1.11    Ca    9.5      05 Aug 2017 04:17  Phos  3.1     08-04  Mg     2.1     08-05    TPro  7.2  /  Alb  x   /  TBili  x   /  DBili  x   /  AST  x   /  ALT  x   /  AlkPhos  x   08-04      IMPRESSION: 72M w/ AFib, temporal arteritis, and HFrEF (20%), 8/3/17 a/w chest pain and BRYCE.    (1)Renal - BRYCE on CKD - prerenal - creatinine further improving; resolved.    (2)Metabolic acidosis - likely BRYCE associated; resolved.    (3)Chest pain - low suspicion for ACS per Cardiology. Post TTE.    (4)Vascular - likely RLE small vessel disease based on ABIs       RECOMMEND:  1.  BMP daily  2.  F/U TTE  3.  Dose new meds for GFR 40-50ml/min  4.  If cardiac cath/LE angio required, suggest mucomyst 1200mg po bid x 4 doses as well as 1/2NS + 75meq/L NaHCO3 periprocedurally (250 cc pre-cath and 125cc/h x 4 hours post-cath)                RADIOLOGY & ADDITIONAL STUDIES:        ASSESSMENT      RECOMMENDATIONS Patient seen and examined sitting in chair with no new complaints.  NAD noted.  Patient breathing evenly & non-labored.  Denies CP, SOB, N/V.    REVIEW OF SYSTEMS:  As per HPI, otherwise 8 full 10 ROS were unremarkable    MEDICATIONS  (STANDING):  amiodarone    Tablet 400 milliGRAM(s) Oral daily  carvedilol 3.125 milliGRAM(s) Oral every 12 hours  isosorbide   mononitrate ER Tablet (IMDUR) 30 milliGRAM(s) Oral daily  aspirin  chewable 81 milliGRAM(s) Oral daily  predniSONE   Tablet 10 milliGRAM(s) Oral daily  pantoprazole    Tablet 40 milliGRAM(s) Oral before breakfast  mirtazapine 45 milliGRAM(s) Oral at bedtime  atorvastatin 20 milliGRAM(s) Oral at bedtime  latanoprost 0.005% Ophthalmic Solution 1 Drop(s) Both EYES at bedtime      VITAL:  T(C): , Max: 37.6 (08-04-17 @ 17:05)  T(F): , Max: 99.6 (08-04-17 @ 17:05)  HR: 68 (08-05-17 @ 11:10)  BP: 96/65 (08-05-17 @ 11:10)  BP(mean): 76 (08-05-17 @ 11:10)  RR: 16 (08-05-17 @ 11:10)  SpO2: 98% (08-05-17 @ 11:10)  Wt(kg): --    I and O's:    08-05 @ 07:01  -  08-05 @ 14:45  --------------------------------------------------------  IN: 240 mL / OUT: 0 mL / NET: 240 mL          PHYSICAL EXAM:    Constitutional: NAD  HEENT: PERRLA, EOMI,  MMM  Neck: No LAD, No JVD  Respiratory: CTAB  Cardiovascular: S1 and S2  Gastrointestinal: BS+, soft, NT/ND  Extremities: No peripheral edema  Neurological: A/O x 3, no focal deficits  Psychiatric: Normal mood, normal affect  : No [place]  Skin: No rashes  Access: Not applicable    LABS:                        11.9   6.70  )-----------( 239      ( 05 Aug 2017 04:17 )             36.4     08-05    143  |  103  |  19  ----------------------------<  103<H>  3.6   |  25  |  1.11    Ca    9.5      05 Aug 2017 04:17  Phos  3.1     08-04  Mg     2.1     08-05    TPro  7.2  /  Alb  x   /  TBili  x   /  DBili  x   /  AST  x   /  ALT  x   /  AlkPhos  x   08-04      IMPRESSION: 72M w/ AFib, temporal arteritis, and HFrEF (20%), 8/3/17 a/w chest pain and BRYCE.    (1)Renal - BRYCE on CKD - prerenal - creatinine further improving; resolved.    (2)Metabolic acidosis - likely BRYCE associated; resolved.    (3)Chest pain - low suspicion for ACS per Cardiology. Post TTE.    (4)Vascular - likely RLE small vessel disease based on ABIs     (5)Hypokalemia:  boderline low; s/p PO repletion today.      RECOMMEND:  1.  BMP daily  2.  F/U TTE  3.  Dose new meds for GFR 40-50ml/min  4.  If cardiac cath/LE angio required, suggest mucomyst 1200mg po bid x 4 doses as well as 1/2NS + 75meq/L NaHCO3 periprocedurally (250 cc pre-cath and 125cc/h x 4 hours post-cath)  5.  A/w KCL 40meq x1 dose repletion                RADIOLOGY & ADDITIONAL STUDIES:        ASSESSMENT      RECOMMENDATIONS

## 2017-08-05 NOTE — PROGRESS NOTE ADULT - PROBLEM SELECTOR PROBLEM 2
Acute renal failure superimposed on stage 3 chronic kidney disease, unspecified acute renal failure type

## 2017-08-05 NOTE — CHART NOTE - NSCHARTNOTEFT_GEN_A_CORE
Episode of Wide complex tachycardia on telemetry with HR up to 130 . HR now V paced at105. Patient laying in bed in NAD offers no complaints. Will give am coreg dose .electrolytes sent  Continue to monitor closely

## 2017-08-05 NOTE — PROGRESS NOTE ADULT - PROBLEM SELECTOR PLAN 1
Unclear etiology. Not related to food and patient not able to express precipitating factors. 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 Normal CK. ACS r/o. However, as per HPI note, reported recent history of positive stress test. However, unsure if patient is a good candidate for invasive cardiac w/u.  - pending repeat TTE. If unchanged compared to prior, pt to follow-up with outpatient cardiologist  - will need to consult EP tomorrow to interrogate AICD.  - Unclear etiology. Not related to food and patient not able to express precipitating factors. 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 Normal CK. ACS r/o. However, as per HPI note, reported recent history of positive stress test. However, unsure if patient is a good candidate for invasive cardiac w/u.  - pending repeat TTE. If unchanged compared to prior, pt to follow-up with outpatient cardiologist  - will need to consult EP  to interrogate AICD after weekend   -

## 2017-08-06 LAB
BUN SERPL-MCNC: 16 MG/DL — SIGNIFICANT CHANGE UP (ref 7–23)
BUN SERPL-MCNC: 19 MG/DL — SIGNIFICANT CHANGE UP (ref 7–23)
CALCIUM SERPL-MCNC: 7.2 MG/DL — LOW (ref 8.4–10.5)
CALCIUM SERPL-MCNC: 9.7 MG/DL — SIGNIFICANT CHANGE UP (ref 8.4–10.5)
CHLORIDE SERPL-SCNC: 102 MMOL/L — SIGNIFICANT CHANGE UP (ref 98–107)
CHLORIDE SERPL-SCNC: 72 MMOL/L — LOW (ref 98–107)
CO2 SERPL-SCNC: 13 MMOL/L — LOW (ref 22–31)
CO2 SERPL-SCNC: 21 MMOL/L — LOW (ref 22–31)
CREAT SERPL-MCNC: 0.89 MG/DL — SIGNIFICANT CHANGE UP (ref 0.5–1.3)
CREAT SERPL-MCNC: 1.11 MG/DL — SIGNIFICANT CHANGE UP (ref 0.5–1.3)
GLUCOSE SERPL-MCNC: 101 MG/DL — HIGH (ref 70–99)
GLUCOSE SERPL-MCNC: 154 MG/DL — HIGH (ref 70–99)
HCT VFR BLD CALC: 42.9 % — SIGNIFICANT CHANGE UP (ref 39–50)
HGB BLD-MCNC: 13.5 G/DL — SIGNIFICANT CHANGE UP (ref 13–17)
INR BLD: 3.6 — HIGH (ref 0.88–1.17)
MAGNESIUM SERPL-MCNC: 3 MG/DL — HIGH (ref 1.6–2.6)
MCHC RBC-ENTMCNC: 31.5 % — LOW (ref 32–36)
MCHC RBC-ENTMCNC: 35.4 PG — HIGH (ref 27–34)
MCV RBC AUTO: 112.6 FL — HIGH (ref 80–100)
NRBC # FLD: 0.14 — SIGNIFICANT CHANGE UP
NRBC FLD-RTO: 1.7 — SIGNIFICANT CHANGE UP
PHOSPHATE SERPL-MCNC: 1.8 MG/DL — LOW (ref 2.5–4.5)
PLATELET # BLD AUTO: 242 K/UL — SIGNIFICANT CHANGE UP (ref 150–400)
PMV BLD: SIGNIFICANT CHANGE UP FL (ref 7–13)
POTASSIUM SERPL-MCNC: 3.4 MMOL/L — LOW (ref 3.5–5.3)
POTASSIUM SERPL-MCNC: 4.2 MMOL/L — SIGNIFICANT CHANGE UP (ref 3.5–5.3)
POTASSIUM SERPL-SCNC: 3.4 MMOL/L — LOW (ref 3.5–5.3)
POTASSIUM SERPL-SCNC: 4.2 MMOL/L — SIGNIFICANT CHANGE UP (ref 3.5–5.3)
PROTHROM AB SERPL-ACNC: 41.4 SEC — HIGH (ref 9.8–13.1)
RBC # BLD: 3.81 M/UL — LOW (ref 4.2–5.8)
RBC # FLD: SIGNIFICANT CHANGE UP % (ref 10.3–14.5)
SODIUM SERPL-SCNC: 142 MMOL/L — SIGNIFICANT CHANGE UP (ref 135–145)
SODIUM SERPL-SCNC: 177 MMOL/L — CRITICAL HIGH (ref 135–145)
WBC # BLD: 8.38 K/UL — SIGNIFICANT CHANGE UP (ref 3.8–10.5)
WBC # FLD AUTO: 8.38 K/UL — SIGNIFICANT CHANGE UP (ref 3.8–10.5)

## 2017-08-06 PROCEDURE — 93306 TTE W/DOPPLER COMPLETE: CPT | Mod: 26

## 2017-08-06 PROCEDURE — 99233 SBSQ HOSP IP/OBS HIGH 50: CPT

## 2017-08-06 RX ADMIN — ISOSORBIDE MONONITRATE 30 MILLIGRAM(S): 60 TABLET, EXTENDED RELEASE ORAL at 12:45

## 2017-08-06 RX ADMIN — Medication 81 MILLIGRAM(S): at 12:45

## 2017-08-06 RX ADMIN — MIRTAZAPINE 45 MILLIGRAM(S): 45 TABLET, ORALLY DISINTEGRATING ORAL at 21:51

## 2017-08-06 RX ADMIN — AMIODARONE HYDROCHLORIDE 400 MILLIGRAM(S): 400 TABLET ORAL at 05:44

## 2017-08-06 RX ADMIN — LATANOPROST 1 DROP(S): 0.05 SOLUTION/ DROPS OPHTHALMIC; TOPICAL at 21:51

## 2017-08-06 RX ADMIN — CARVEDILOL PHOSPHATE 3.12 MILLIGRAM(S): 80 CAPSULE, EXTENDED RELEASE ORAL at 05:46

## 2017-08-06 RX ADMIN — ATORVASTATIN CALCIUM 20 MILLIGRAM(S): 80 TABLET, FILM COATED ORAL at 21:51

## 2017-08-06 RX ADMIN — Medication 10 MILLIGRAM(S): at 05:46

## 2017-08-06 RX ADMIN — PANTOPRAZOLE SODIUM 40 MILLIGRAM(S): 20 TABLET, DELAYED RELEASE ORAL at 05:46

## 2017-08-06 NOTE — PROGRESS NOTE ADULT - PROBLEM SELECTOR PLAN 3
therapeutic INR. Patient recently started on amiodarone which is known to increase effects of warfarin so will closely monitor  - loading dose of amiodarone 400mg daily  - warfarin 5mg daily  - daily INR  - HR goal <110

## 2017-08-06 NOTE — PROGRESS NOTE ADULT - PROBLEM SELECTOR PLAN 1
resolved  Unclear etiology. Not related to food and patient not able to express precipitating factors. 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 Normal CK. ACS r/o. However, as per HPI note, reported recent history of positive stress test. However, unsure if patient is a good candidate for invasive cardiac w/u.  - pending repeat TTE. If unchanged compared to prior, pt to follow-up with outpatient cardiologist  - will need to consult EP  to interrogate AICD after weekend   -

## 2017-08-06 NOTE — PROGRESS NOTE ADULT - SUBJECTIVE AND OBJECTIVE BOX
Patient is a 72y old  Male who presents with a chief complaint of Chest pain (03 Aug 2017 15:51)      SUBJECTIVE / OVERNIGHT EVENTS: patient seen and examined by bedside, denies headache, dizziness, SOB, CP, Palpitaions, N/V/D, abdominal pain   Tele: V paced in     MEDICATIONS  (STANDING):  amiodarone    Tablet 400 milliGRAM(s) Oral daily  carvedilol 3.125 milliGRAM(s) Oral every 12 hours  isosorbide   mononitrate ER Tablet (IMDUR) 30 milliGRAM(s) Oral daily  aspirin  chewable 81 milliGRAM(s) Oral daily  predniSONE   Tablet 10 milliGRAM(s) Oral daily  pantoprazole    Tablet 40 milliGRAM(s) Oral before breakfast  mirtazapine 45 milliGRAM(s) Oral at bedtime  atorvastatin 20 milliGRAM(s) Oral at bedtime  latanoprost 0.005% Ophthalmic Solution 1 Drop(s) Both EYES at bedtime    MEDICATIONS  (PRN):  acetaminophen   Tablet. 650 milliGRAM(s) Oral every 6 hours PRN mild, moderate pain      Vital Signs Last 24 Hrs  T(C): 37.1 (06 Aug 2017 05:30), Max: 37.1 (06 Aug 2017 05:30)  T(F): 98.8 (06 Aug 2017 05:30), Max: 98.8 (06 Aug 2017 05:30)  HR: 104 (06 Aug 2017 12:48) (69 - 104)  BP: 131/91 (06 Aug 2017 12:48) (100/65 - 131/91)  BP(mean): 100 (05 Aug 2017 15:09) (100 - 100)  RR: 18 (06 Aug 2017 12:48) (17 - 18)  SpO2: 99% (06 Aug 2017 12:48) (96% - 99%)  CAPILLARY BLOOD GLUCOSE        I&O's Summary    05 Aug 2017 07:01  -  06 Aug 2017 07:00  --------------------------------------------------------  IN: 600 mL / OUT: 0 mL / NET: 600 mL        PHYSICAL EXAM:  GENERAL: NAD,   HEAD:  Atraumatic, Normocephalic  EYES: EOMI, PERRLA, conjunctiva and sclera clear  NECK: Supple,   CHEST/LUNG: Clear to auscultation bilaterally; No wheeze  HEART: Irregular rate and rhythm, systolic murmur +  ABDOMEN: Soft, Nontender, Nondistended; Bowel sounds present  EXTREMITIES:   No clubbing, cyanosis, edema +,Contracted RUE  PSYCH: AAOx2-3  NEUROLOGY: rt hemiparesis, expressive aphasia    SKIN: No rashes or lesions    LABS:                        13.5   8.38  )-----------( 242      ( 06 Aug 2017 07:46 )             42.9     08-06    142  |  102  |  19  ----------------------------<  154<H>  4.2   |  21<L>  |  1.11    Ca    9.7      06 Aug 2017 10:30  Phos  1.8     08-06  Mg     3.0     08-06      PT/INR - ( 06 Aug 2017 07:46 )   PT: 41.4 SEC;   INR: 3.60          PTT - ( 05 Aug 2017 04:17 )  PTT:42.4 SEC          RADIOLOGY & ADDITIONAL TESTS:    Imaging Personally Reviewed:    Consultant(s) Notes Reviewed:      Care Discussed with Consultants/Other Providers:

## 2017-08-07 DIAGNOSIS — N18.3 CHRONIC KIDNEY DISEASE, STAGE 3 (MODERATE): ICD-10-CM

## 2017-08-07 LAB
BUN SERPL-MCNC: 16 MG/DL — SIGNIFICANT CHANGE UP (ref 7–23)
CALCIUM SERPL-MCNC: 9.2 MG/DL — SIGNIFICANT CHANGE UP (ref 8.4–10.5)
CHLORIDE SERPL-SCNC: 105 MMOL/L — SIGNIFICANT CHANGE UP (ref 98–107)
CO2 SERPL-SCNC: 19 MMOL/L — LOW (ref 22–31)
CREAT SERPL-MCNC: 0.98 MG/DL — SIGNIFICANT CHANGE UP (ref 0.5–1.3)
GLUCOSE SERPL-MCNC: 86 MG/DL — SIGNIFICANT CHANGE UP (ref 70–99)
HCT VFR BLD CALC: 37.3 % — LOW (ref 39–50)
HGB BLD-MCNC: 12.1 G/DL — LOW (ref 13–17)
INR BLD: 4.22 — HIGH (ref 0.88–1.17)
MAGNESIUM SERPL-MCNC: 1.9 MG/DL — SIGNIFICANT CHANGE UP (ref 1.6–2.6)
MCHC RBC-ENTMCNC: 32.4 % — SIGNIFICANT CHANGE UP (ref 32–36)
MCHC RBC-ENTMCNC: 34.8 PG — HIGH (ref 27–34)
MCV RBC AUTO: 107.2 FL — HIGH (ref 80–100)
NRBC # FLD: 0.09 — SIGNIFICANT CHANGE UP
NRBC FLD-RTO: 1 — SIGNIFICANT CHANGE UP
PHOSPHATE SERPL-MCNC: 2.3 MG/DL — LOW (ref 2.5–4.5)
PLATELET # BLD AUTO: 234 K/UL — SIGNIFICANT CHANGE UP (ref 150–400)
PMV BLD: 10.6 FL — SIGNIFICANT CHANGE UP (ref 7–13)
POTASSIUM SERPL-MCNC: 4.3 MMOL/L — SIGNIFICANT CHANGE UP (ref 3.5–5.3)
POTASSIUM SERPL-SCNC: 4.3 MMOL/L — SIGNIFICANT CHANGE UP (ref 3.5–5.3)
PROTHROM AB SERPL-ACNC: 48.7 SEC — HIGH (ref 9.8–13.1)
RBC # BLD: 3.48 M/UL — LOW (ref 4.2–5.8)
RBC # FLD: 17.4 % — HIGH (ref 10.3–14.5)
SODIUM SERPL-SCNC: 142 MMOL/L — SIGNIFICANT CHANGE UP (ref 135–145)
WBC # BLD: 9.29 K/UL — SIGNIFICANT CHANGE UP (ref 3.8–10.5)
WBC # FLD AUTO: 9.29 K/UL — SIGNIFICANT CHANGE UP (ref 3.8–10.5)

## 2017-08-07 PROCEDURE — 99233 SBSQ HOSP IP/OBS HIGH 50: CPT

## 2017-08-07 PROCEDURE — 93284 PRGRMG EVAL IMPLANTABLE DFB: CPT | Mod: 26

## 2017-08-07 PROCEDURE — 99232 SBSQ HOSP IP/OBS MODERATE 35: CPT

## 2017-08-07 RX ORDER — POTASSIUM CHLORIDE 20 MEQ
20 PACKET (EA) ORAL DAILY
Qty: 0 | Refills: 0 | Status: DISCONTINUED | OUTPATIENT
Start: 2017-08-07 | End: 2017-08-10

## 2017-08-07 RX ORDER — FUROSEMIDE 40 MG
40 TABLET ORAL DAILY
Qty: 0 | Refills: 0 | Status: DISCONTINUED | OUTPATIENT
Start: 2017-08-07 | End: 2017-08-10

## 2017-08-07 RX ADMIN — Medication 10 MILLIGRAM(S): at 05:42

## 2017-08-07 RX ADMIN — CARVEDILOL PHOSPHATE 3.12 MILLIGRAM(S): 80 CAPSULE, EXTENDED RELEASE ORAL at 05:42

## 2017-08-07 RX ADMIN — LATANOPROST 1 DROP(S): 0.05 SOLUTION/ DROPS OPHTHALMIC; TOPICAL at 21:28

## 2017-08-07 RX ADMIN — ISOSORBIDE MONONITRATE 30 MILLIGRAM(S): 60 TABLET, EXTENDED RELEASE ORAL at 11:56

## 2017-08-07 RX ADMIN — MIRTAZAPINE 45 MILLIGRAM(S): 45 TABLET, ORALLY DISINTEGRATING ORAL at 21:28

## 2017-08-07 RX ADMIN — PANTOPRAZOLE SODIUM 40 MILLIGRAM(S): 20 TABLET, DELAYED RELEASE ORAL at 05:42

## 2017-08-07 RX ADMIN — AMIODARONE HYDROCHLORIDE 400 MILLIGRAM(S): 400 TABLET ORAL at 05:42

## 2017-08-07 RX ADMIN — Medication 81 MILLIGRAM(S): at 11:56

## 2017-08-07 RX ADMIN — ATORVASTATIN CALCIUM 20 MILLIGRAM(S): 80 TABLET, FILM COATED ORAL at 21:28

## 2017-08-07 NOTE — CHART NOTE - NSCHARTNOTEFT_GEN_A_CORE
ELECTROPHYSIOLOGY    Device Interrogation Performed                                  Date/Time:     08/07/2017  @ 4:45pm  :              [employer] CRT-D                          Model:      Dynagen x$ CRT-D                          Mode:                         DDD                              Rate:  50/120     Atrial Lead:  P wave amplitude:              2.3            mv            Impedance                         675              Ohms  Threshold                         0.5                V @     0.5           ms      Ventricular Lead: RV  R wave amplitude         8.5                  mv  Impedance                  373                     Ohms  Threshold                    0.7                      V @         0.5       ms      Ventricular Lead: LV  amplitude                   17.5                      mv  Impedance                  591                      Ohms  Threshold                    0.6                     V @     0.5        ms                                  Battery Status:                     Good                Underlying Rhythm:             Normal sinus rhythm     Events/Observation:            SVT episodes x2 at 164 and 174bpm on 07/27/2017. No NSVT/VT episodes. No therapies.                                            Biventricular pacing 79%. Brief episode of atrial fibrillation, rate controlled.     Impression/Plan:  Normal BiV- ICD function.   Normal sensing and pacing via iterative testing. Good battery status. Excellent threshold capture.  No reprogramming.

## 2017-08-07 NOTE — PROGRESS NOTE ADULT - ASSESSMENT
72M w/ AFib, temporal arteritis, and HFrEF (20%), 8/3/17 a/w chest pain and BRYCE.    (1)Renal - BRYCE on CKD - prerenal - creatinine further improving; resolved.    (2)Metabolic acidosis - likely BRYCE associated; resolved.    (3)Chest pain - low suspicion for ACS per Cardiology. Post TTE.    (4)Vascular - likely RLE small vessel disease based on ABIs     (5)Hypophosphatemia      RECOMMEND:  1.  BMP daily  2.  F/U TTE  3.  Dose new meds for GFR 40-50ml/min  4.  Start lasix 40mg po qd and start KDUR as well.(pt c JVD)  5.  Encourage dairy for low phos

## 2017-08-07 NOTE — PROGRESS NOTE ADULT - ASSESSMENT
71 y/o male, with a PmHx of CHF with an EF of 20%, AICD, Prostate Ca s/p seed implants, Afib on ac, CVA with residual right hemiparesis, is being admitted to telemetry for r/o acs. 73 y/o male, with a PmHx of CHF with an EF of 20%, AICD, Prostate Ca s/p seed implants, Afib on ac, and CVA with residual right hemiparesis admitted for evaluation of chest pain. Cardiac w/u negative for ACS and patient w/o subsequent episodes of chest pain.

## 2017-08-07 NOTE — PROGRESS NOTE ADULT - SUBJECTIVE AND OBJECTIVE BOX
CC: Chest pain      SUBJECTIVE / OVERNIGHT EVENTS:      MEDICATIONS  (STANDING):  amiodarone    Tablet 400 milliGRAM(s) Oral daily  carvedilol 3.125 milliGRAM(s) Oral every 12 hours  isosorbide   mononitrate ER Tablet (IMDUR) 30 milliGRAM(s) Oral daily  aspirin  chewable 81 milliGRAM(s) Oral daily  predniSONE   Tablet 10 milliGRAM(s) Oral daily  pantoprazole    Tablet 40 milliGRAM(s) Oral before breakfast  mirtazapine 45 milliGRAM(s) Oral at bedtime  atorvastatin 20 milliGRAM(s) Oral at bedtime  latanoprost 0.005% Ophthalmic Solution 1 Drop(s) Both EYES at bedtime    MEDICATIONS  (PRN):  acetaminophen   Tablet. 650 milliGRAM(s) Oral every 6 hours PRN mild, moderate pain      Vital Signs Last 24 Hrs  T(C): 36.6 (07 Aug 2017 05:40), Max: 36.9 (06 Aug 2017 21:49)  T(F): 97.8 (07 Aug 2017 05:40), Max: 98.4 (06 Aug 2017 21:49)  HR: 107 (07 Aug 2017 05:40) (99 - 107)  BP: 136/98 (07 Aug 2017 05:40) (97/69 - 136/98)  BP(mean): --  RR: 17 (07 Aug 2017 05:40) (16 - 18)  SpO2: 100% (07 Aug 2017 05:40) (99% - 100%)  CAPILLARY BLOOD GLUCOSE        I&O's Summary    06 Aug 2017 07:01  -  07 Aug 2017 07:00  --------------------------------------------------------  IN: 320 mL / OUT: 0 mL / NET: 320 mL          PHYSICAL EXAM  GENERAL: NAD, well-developed  HEAD:  Atraumatic, Normocephalic  EYES: EOMI, PERRLA, conjunctiva and sclera clear  NECK: Supple, No JVD  CHEST/LUNG: Clear to auscultation bilaterally; No wheeze  HEART: Regular rate and rhythm; No murmurs, rubs, or gallops  ABDOMEN: Soft, Nontender, Nondistended; Bowel sounds present  EXTREMITIES:  2+ Peripheral Pulses, No clubbing, cyanosis, or edema  PSYCH: AAOx3  SKIN: No rashes or lesions    LABS:                        12.1   9.29  )-----------( 234      ( 07 Aug 2017 05:10 )             37.3     08-07    142  |  105  |  16  ----------------------------<  86  4.3   |  19<L>  |  0.98    Ca    9.2      07 Aug 2017 05:10  Phos  2.3     08-07  Mg     1.9     08-07      PT/INR - ( 07 Aug 2017 05:10 )   PT: 48.7 SEC;   INR: 4.22                    RADIOLOGY & ADDITIONAL TESTS:    Imaging Personally Reviewed:  Consultant(s) Notes Reviewed:    Care Discussed with Consultants/Other Providers: CC: Chest pain    SUBJECTIVE / OVERNIGHT EVENTS:  Patient seen and examined this morning. Patient reports no episodes of chest pain. Feels well.    MEDICATIONS  (STANDING):  amiodarone    Tablet 400 milliGRAM(s) Oral daily  carvedilol 3.125 milliGRAM(s) Oral every 12 hours  isosorbide   mononitrate ER Tablet (IMDUR) 30 milliGRAM(s) Oral daily  aspirin  chewable 81 milliGRAM(s) Oral daily  predniSONE   Tablet 10 milliGRAM(s) Oral daily  pantoprazole    Tablet 40 milliGRAM(s) Oral before breakfast  mirtazapine 45 milliGRAM(s) Oral at bedtime  atorvastatin 20 milliGRAM(s) Oral at bedtime  latanoprost 0.005% Ophthalmic Solution 1 Drop(s) Both EYES at bedtime    MEDICATIONS  (PRN):  acetaminophen   Tablet. 650 milliGRAM(s) Oral every 6 hours PRN mild, moderate pain      Vital Signs Last 24 Hrs  T(C): 36.6 (07 Aug 2017 05:40), Max: 36.9 (06 Aug 2017 21:49)  T(F): 97.8 (07 Aug 2017 05:40), Max: 98.4 (06 Aug 2017 21:49)  HR: 107 (07 Aug 2017 05:40) (99 - 107)  BP: 136/98 (07 Aug 2017 05:40) (97/69 - 136/98)  BP(mean): --  RR: 17 (07 Aug 2017 05:40) (16 - 18)  SpO2: 100% (07 Aug 2017 05:40) (99% - 100%)  CAPILLARY BLOOD GLUCOSE        I&O's Summary    06 Aug 2017 07:01  -  07 Aug 2017 07:00  --------------------------------------------------------  IN: 320 mL / OUT: 0 mL / NET: 320 mL      PHYSICAL EXAM  GENERAL: Elderly man sitting comfortably in a chair  EYES: EOMI, conjunctiva and sclera clear  NECK: Supple  CHEST/LUNG: Clear to auscultation bilaterally; No wheeze  HEART: RRR; +systolic murmur  ABDOMEN: Soft, Nontender, Nondistended; Bowel sounds present  EXTREMITIES:  2+ Peripheral Pulses, No clubbing, cyanosis. Mild pitting edema along R ankle and foot. Contracted RUE in flexed position.  NEURO: alert. Expressive aphasia. R facial droop. Paresis of RLE. Full ROM of LUE and LLE      LABS:                        12.1   9.29  )-----------( 234      ( 07 Aug 2017 05:10 )             37.3     08-07    142  |  105  |  16  ----------------------------<  86  4.3   |  19<L>  |  0.98    Ca    9.2      07 Aug 2017 05:10  Phos  2.3     08-07  Mg     1.9     08-07      PT/INR - ( 07 Aug 2017 05:10 )   PT: 48.7 SEC;   INR: 4.22         Consultant(s) Notes Reviewed:  Yes  Care Discussed with Consultants/Other Providers: Yes

## 2017-08-07 NOTE — PROGRESS NOTE ADULT - SUBJECTIVE AND OBJECTIVE BOX
NEPHROLOGY-[facility] [phone]        Patient seen and examined in bed.  He offered no new complaints        MEDICATIONS  (STANDING):  amiodarone    Tablet 400 milliGRAM(s) Oral daily  carvedilol 3.125 milliGRAM(s) Oral every 12 hours  isosorbide   mononitrate ER Tablet (IMDUR) 30 milliGRAM(s) Oral daily  aspirin  chewable 81 milliGRAM(s) Oral daily  predniSONE   Tablet 10 milliGRAM(s) Oral daily  pantoprazole    Tablet 40 milliGRAM(s) Oral before breakfast  mirtazapine 45 milliGRAM(s) Oral at bedtime  atorvastatin 20 milliGRAM(s) Oral at bedtime  latanoprost 0.005% Ophthalmic Solution 1 Drop(s) Both EYES at bedtime      VITAL:  T(C): , Max: 37 (08-07-17 @ 14:52)  T(F): , Max: 98.6 (08-07-17 @ 14:52)  HR: 71 (08-07-17 @ 17:15)  BP: 98/69 (08-07-17 @ 17:15)  BP(mean): --  RR: 16 (08-07-17 @ 17:15)  SpO2: 100% (08-07-17 @ 17:15)  Wt(kg): --    I and O's:    08-06 @ 07:01  -  08-07 @ 07:00  --------------------------------------------------------  IN: 320 mL / OUT: 0 mL / NET: 320 mL    08-07 @ 07:01  -  08-07 @ 17:49  --------------------------------------------------------  IN: 360 mL / OUT: 200 mL / NET: 160 mL          PHYSICAL EXAM:    Constitutional: NAD  HEENT: PERRLA    Neck:   + JVD  Respiratory: CTAB/L  Cardiovascular: S1 and S2  Gastrointestinal: BS+, soft, NT/ND  Extremities: No peripheral edema  Neurological: A/O x 3, no focal deficits  Psychiatric: Normal mood, normal affect  : No [place]  Skin: No rashes  Access: Not applicable    LABS:                        12.1   9.29  )-----------( 234      ( 07 Aug 2017 05:10 )             37.3     08-07    142  |  105  |  16  ----------------------------<  86  4.3   |  19<L>  |  0.98    Ca    9.2      07 Aug 2017 05:10  Phos  2.3     08-07  Mg     1.9     08-07

## 2017-08-07 NOTE — PROGRESS NOTE ADULT - PROBLEM SELECTOR PLAN 1
resolved.  - pending repeat TTE. If unchanged compared to prior, pt to follow-up with outpatient cardiologist  - EP  to interrogate AICD  - resolved. TTE done on 8/6 with findings unchanged compared to prior, pt to follow-up with outpatient cardiologist. Reviwed telemetry - Intermittent PVCs only.  - EP  to interrogate AICD

## 2017-08-07 NOTE — PROGRESS NOTE ADULT - SUBJECTIVE AND OBJECTIVE BOX
Date of Admission:    24H hour events:     MEDICATIONS:  amiodarone    Tablet 400 milliGRAM(s) Oral daily  carvedilol 3.125 milliGRAM(s) Oral every 12 hours  isosorbide   mononitrate ER Tablet (IMDUR) 30 milliGRAM(s) Oral daily  aspirin  chewable 81 milliGRAM(s) Oral daily        acetaminophen   Tablet. 650 milliGRAM(s) Oral every 6 hours PRN  mirtazapine 45 milliGRAM(s) Oral at bedtime    pantoprazole    Tablet 40 milliGRAM(s) Oral before breakfast    predniSONE   Tablet 10 milliGRAM(s) Oral daily  atorvastatin 20 milliGRAM(s) Oral at bedtime    latanoprost 0.005% Ophthalmic Solution 1 Drop(s) Both EYES at bedtime      REVIEW OF SYSTEMS:  Complete 10point ROS negative.    PHYSICAL EXAM:  T(C): 36.6 (08-07-17 @ 05:40), Max: 36.9 (08-06-17 @ 21:49)  HR: 107 (08-07-17 @ 05:40) (99 - 107)  BP: 136/98 (08-07-17 @ 05:40) (97/69 - 136/98)  RR: 17 (08-07-17 @ 05:40) (16 - 18)  SpO2: 100% (08-07-17 @ 05:40) (99% - 100%)  Wt(kg): --  I&O's Summary    06 Aug 2017 07:01  -  07 Aug 2017 07:00  --------------------------------------------------------  IN: 320 mL / OUT: 0 mL / NET: 320 mL        Appearance: Normal	  HEENT:   Normal oral mucosa, PERRL, EOMI	  Lymphatic: No lymphadenopathy  Cardiovascular: Normal S1 S2, No JVD, No murmurs, No edema  Respiratory: Lungs clear to auscultation	  Psychiatry: A & O x 3, Mood & affect appropriate  Gastrointestinal:  Soft, Non-tender, + BS	  Skin: No rashes, No ecchymoses, No cyanosis	  Neurologic: Non-focal  Extremities: Normal range of motion, No clubbing, cyanosis or edema  Vascular: Peripheral pulses palpable 2+ bilaterally        LABS:	 	    CBC Full  -  ( 07 Aug 2017 05:10 )  WBC Count : 9.29 K/uL  Hemoglobin : 12.1 g/dL  Hematocrit : 37.3 %  Platelet Count - Automated : 234 K/uL  Mean Cell Volume : 107.2 fL  Mean Cell Hemoglobin : 34.8 pg  Mean Cell Hemoglobin Concentration : 32.4 %  Auto Neutrophil # : x  Auto Lymphocyte # : x  Auto Monocyte # : x  Auto Eosinophil # : x  Auto Basophil # : x  Auto Neutrophil % : x  Auto Lymphocyte % : x  Auto Monocyte % : x  Auto Eosinophil % : x  Auto Basophil % : x    08-07    142  |  105  |  16  ----------------------------<  86  4.3   |  19<L>  |  0.98  08-06    142  |  102  |  19  ----------------------------<  154<H>  4.2   |  21<L>  |  1.11    Ca    9.2      07 Aug 2017 05:10  Ca    9.7      06 Aug 2017 10:30  Phos  2.3     08-07  Phos  1.8     08-06  Mg     1.9     08-07  Mg     3.0     08-06        proBNP: Serum Pro-Brain Natriuretic Peptide: 4762 pg/mL (08-03 @ 02:30)      	  ASSESSMENT/PLAN: 	    1. No overnight events. No signs of angina or HF  2. Can defer echocardiogram - pt with known severe CM; unlikely to reveal anything new.    Please call with questions.      Sherman Cordero MD, FACC  [id number]

## 2017-08-07 NOTE — PROGRESS NOTE ADULT - PROBLEM SELECTOR PLAN 2
Supratherapeutic   - amiodarone 400mg daily  - HOLD warfarin tonight  - daily INR  - HR goal <110 Supratherapeutic INR.   - continue loading dose amiodarone 400mg daily. Anticipated to transition to amiodarone 200mg daily on 8/9  - HOLD warfarin tonight  - daily INR  - HR goal <110

## 2017-08-08 LAB
BUN SERPL-MCNC: 18 MG/DL — SIGNIFICANT CHANGE UP (ref 7–23)
CALCIUM SERPL-MCNC: 9.2 MG/DL — SIGNIFICANT CHANGE UP (ref 8.4–10.5)
CHLORIDE SERPL-SCNC: 105 MMOL/L — SIGNIFICANT CHANGE UP (ref 98–107)
CO2 SERPL-SCNC: 22 MMOL/L — SIGNIFICANT CHANGE UP (ref 22–31)
CREAT SERPL-MCNC: 0.99 MG/DL — SIGNIFICANT CHANGE UP (ref 0.5–1.3)
GAS PNL BLDMV: SIGNIFICANT CHANGE UP
GLUCOSE SERPL-MCNC: 99 MG/DL — SIGNIFICANT CHANGE UP (ref 70–99)
HCT VFR BLD CALC: 37 % — LOW (ref 39–50)
HGB BLD-MCNC: 12.1 G/DL — LOW (ref 13–17)
INR BLD: 3.5 — HIGH (ref 0.88–1.17)
MAGNESIUM SERPL-MCNC: 2 MG/DL — SIGNIFICANT CHANGE UP (ref 1.6–2.6)
MCHC RBC-ENTMCNC: 32.7 % — SIGNIFICANT CHANGE UP (ref 32–36)
MCHC RBC-ENTMCNC: 34.6 PG — HIGH (ref 27–34)
MCV RBC AUTO: 105.7 FL — HIGH (ref 80–100)
NRBC # FLD: 0.08 — SIGNIFICANT CHANGE UP
PLATELET # BLD AUTO: 227 K/UL — SIGNIFICANT CHANGE UP (ref 150–400)
PMV BLD: 10.3 FL — SIGNIFICANT CHANGE UP (ref 7–13)
POTASSIUM SERPL-MCNC: 4.2 MMOL/L — SIGNIFICANT CHANGE UP (ref 3.5–5.3)
POTASSIUM SERPL-SCNC: 4.2 MMOL/L — SIGNIFICANT CHANGE UP (ref 3.5–5.3)
PROTHROM AB SERPL-ACNC: 40.2 SEC — HIGH (ref 9.8–13.1)
RBC # BLD: 3.5 M/UL — LOW (ref 4.2–5.8)
RBC # FLD: 17.5 % — HIGH (ref 10.3–14.5)
SODIUM SERPL-SCNC: 141 MMOL/L — SIGNIFICANT CHANGE UP (ref 135–145)
WBC # BLD: 9.69 K/UL — SIGNIFICANT CHANGE UP (ref 3.8–10.5)
WBC # FLD AUTO: 9.69 K/UL — SIGNIFICANT CHANGE UP (ref 3.8–10.5)

## 2017-08-08 PROCEDURE — 99233 SBSQ HOSP IP/OBS HIGH 50: CPT

## 2017-08-08 RX ORDER — CARVEDILOL PHOSPHATE 80 MG/1
6.25 CAPSULE, EXTENDED RELEASE ORAL EVERY 12 HOURS
Qty: 0 | Refills: 0 | Status: DISCONTINUED | OUTPATIENT
Start: 2017-08-08 | End: 2017-08-08

## 2017-08-08 RX ORDER — CARVEDILOL PHOSPHATE 80 MG/1
6.25 CAPSULE, EXTENDED RELEASE ORAL EVERY 12 HOURS
Qty: 0 | Refills: 0 | Status: DISCONTINUED | OUTPATIENT
Start: 2017-08-08 | End: 2017-08-10

## 2017-08-08 RX ADMIN — LATANOPROST 1 DROP(S): 0.05 SOLUTION/ DROPS OPHTHALMIC; TOPICAL at 21:49

## 2017-08-08 RX ADMIN — MIRTAZAPINE 45 MILLIGRAM(S): 45 TABLET, ORALLY DISINTEGRATING ORAL at 21:49

## 2017-08-08 RX ADMIN — Medication 10 MILLIGRAM(S): at 04:52

## 2017-08-08 RX ADMIN — CARVEDILOL PHOSPHATE 3.12 MILLIGRAM(S): 80 CAPSULE, EXTENDED RELEASE ORAL at 04:53

## 2017-08-08 RX ADMIN — Medication 81 MILLIGRAM(S): at 11:31

## 2017-08-08 RX ADMIN — ATORVASTATIN CALCIUM 20 MILLIGRAM(S): 80 TABLET, FILM COATED ORAL at 21:49

## 2017-08-08 RX ADMIN — AMIODARONE HYDROCHLORIDE 400 MILLIGRAM(S): 400 TABLET ORAL at 04:52

## 2017-08-08 RX ADMIN — Medication 40 MILLIGRAM(S): at 04:52

## 2017-08-08 RX ADMIN — Medication 20 MILLIEQUIVALENT(S): at 11:31

## 2017-08-08 RX ADMIN — PANTOPRAZOLE SODIUM 40 MILLIGRAM(S): 20 TABLET, DELAYED RELEASE ORAL at 04:52

## 2017-08-08 RX ADMIN — CARVEDILOL PHOSPHATE 6.25 MILLIGRAM(S): 80 CAPSULE, EXTENDED RELEASE ORAL at 17:35

## 2017-08-08 RX ADMIN — ISOSORBIDE MONONITRATE 30 MILLIGRAM(S): 60 TABLET, EXTENDED RELEASE ORAL at 11:31

## 2017-08-08 NOTE — PROGRESS NOTE ADULT - PROBLEM SELECTOR PLAN 1
resolved. TTE done on 8/6 with findings unchanged compared to prior, pt to follow-up with outpatient cardiologist. Reviwed telemetry - Intermittent PVCs only. AICD interrogated by EP with no arrhythmias or shocks noted. Supratherapeutic INR. HR in low 100s  - continue loading dose amiodarone 400mg daily. Anticipated to transition to maintenance dosing of amiodarone 200mg daily on 8/9  - increase to carvedilol 6.25mg q12h  - continue to HOLD warfarin tonight  - daily INR  - HR goal <110

## 2017-08-08 NOTE — PROGRESS NOTE ADULT - PROBLEM SELECTOR PLAN 4
con't flomax Being followed by nephrology, appreciate recs  - renally dose medications  - Lasix 40mg daily, potassium chloride 20mEq daily as per nephrology  - will like d/c KCl when patient is restarted on ACEi.

## 2017-08-08 NOTE — PHYSICAL THERAPY INITIAL EVALUATION ADULT - RANGE OF MOTION EXAMINATION, REHAB EVAL
bilateral upper extremity ROM was WFL (within functional limits)/R sh passively WFL, R sh flx AROM to ~ 90 deg/bilateral lower extremity ROM was WFL (within functional limits)

## 2017-08-08 NOTE — PHYSICAL THERAPY INITIAL EVALUATION ADULT - PERTINENT HX OF CURRENT PROBLEM, REHAB EVAL
73 y/o male, with a PmHx of CHF with an EF of 20%, AICD, Prostate Ca s/p seed implants, Afib on ac, CVA with residual right hemiparesis, presented to the ED c/o chest pain and right LE pain.  r/o ACS

## 2017-08-08 NOTE — PHYSICAL THERAPY INITIAL EVALUATION ADULT - GAIT DISTANCE, PT EVAL
5 lateral steps at bedside - unable to assess further ambulation 2/2 limited by tele monitor (pt not hooked to portable unit) 5 lateral steps at bedside - unable to assess further ambulation 2/2 limited by tele monitor (pt not attached to portable unit)

## 2017-08-08 NOTE — PROGRESS NOTE ADULT - ASSESSMENT
73 y/o male, with a PmHx of CHF with an EF of 20%, AICD, Prostate Ca s/p seed implants, Afib on ac, and CVA with residual right hemiparesis admitted for evaluation of chest pain. Cardiac w/u negative for ACS and patient w/o subsequent episodes of chest pain. 73 y/o male, with a PmHx of CHF with an EF of 20%, AICD, Prostate Ca s/p seed implants, Afib on ac, and CVA with residual right hemiparesis admitted for evaluation of chest pain. Cardiac w/u negative for ACS and patient w/o subsequent episodes of chest pain, but with supratherapeutic INR.

## 2017-08-08 NOTE — PROGRESS NOTE ADULT - PROBLEM SELECTOR PLAN 3
Patient with ischemic cardiomyopathy s/p AICD placement  - continue home regimen of isosorbide mononitrate, lisinopril, and carvedilol  - continue atorvastatin Patient with ischemic cardiomyopathy s/p AICD placement. Correction from yesterday's note - lisinopril was held in the setting of BRYCE.  - continue home regimen of isosorbide mononitrate  - carvedilol 6.25mg BID  - continue atorvastatin  - plan to resume lisinopril tomorrow as long as renal function remains stable at baseline, since started on lasix today

## 2017-08-08 NOTE — PROGRESS NOTE ADULT - PROBLEM SELECTOR PLAN 7
Being followed by nephrology, appreciate recs  - renally dose medications  - Lasix 40mg daily, potassium chloride 20mEq daily as per nephrology con't prednisone

## 2017-08-08 NOTE — PROGRESS NOTE ADULT - ASSESSMENT
72M w/ AFib, temporal arteritis, and HFrEF (20%), 8/3/17 a/w chest pain and BRYCE.  CKD stage 3    (1)Renal - BRYCE on CKD - Stable renal function.    (2)Metabolic acidosis - likely BRYCE associated; resolved.    (3)Chest pain - low suspicion for ACS per Cardiology. Post TTE.    (4)Vascular - likely RLE small vessel disease based on ABIs     (5)Hypophosphatemia      RECOMMEND:  1.  BMP daily  2.  Dose new meds for GFR 40-50ml/min  3.  Can the Coreg dose be increased.  Once heart rate improved then ACE or ARB  4.  Cont lasix 40mg po qd KDUR as well.(pt c JVD)  5.  Encourage dairy for low phos

## 2017-08-08 NOTE — PROGRESS NOTE ADULT - SUBJECTIVE AND OBJECTIVE BOX
[facility] [phone]        Patient seen and examined in bed.  He had an uneventful night        MEDICATIONS  (STANDING):  amiodarone    Tablet 400 milliGRAM(s) Oral daily  carvedilol 3.125 milliGRAM(s) Oral every 12 hours  isosorbide   mononitrate ER Tablet (IMDUR) 30 milliGRAM(s) Oral daily  aspirin  chewable 81 milliGRAM(s) Oral daily  predniSONE   Tablet 10 milliGRAM(s) Oral daily  pantoprazole    Tablet 40 milliGRAM(s) Oral before breakfast  mirtazapine 45 milliGRAM(s) Oral at bedtime  atorvastatin 20 milliGRAM(s) Oral at bedtime  latanoprost 0.005% Ophthalmic Solution 1 Drop(s) Both EYES at bedtime  furosemide    Tablet 40 milliGRAM(s) Oral daily  potassium chloride    Tablet ER 20 milliEquivalent(s) Oral daily      VITAL:  T(C): , Max: 37 (08-07-17 @ 14:52)  T(F): , Max: 98.6 (08-07-17 @ 14:52)  HR: 101 (08-08-17 @ 04:49)  BP: 127/99 (08-08-17 @ 04:49)  BP(mean): --  RR: 18 (08-08-17 @ 04:49)  SpO2: 100% (08-08-17 @ 04:49)  Wt(kg): --    I and O's:    08-07 @ 07:01  -  08-08 @ 07:00  --------------------------------------------------------  IN: 580 mL / OUT: 200 mL / NET: 380 mL          PHYSICAL EXAM:    Constitutional: NAD  HEENT: PERRLA    Neck: + JVD  Respiratory: CTAB/L  Cardiovascular: S1 and S2  Gastrointestinal: BS+, soft, NT/ND  Extremities: No peripheral edema  Neurological: A/O x 3, no focal deficits  Psychiatric: Normal mood, normal affect  : No Carolnia  Skin: No rashes  Access: Not applicable    LABS:                        12.1   9.69  )-----------( 227      ( 08 Aug 2017 05:08 )             37.0     08-08    141  |  105  |  18  ----------------------------<  99  4.2   |  22  |  0.99    Ca    9.2      08 Aug 2017 05:08  Phos  2.3     08-07  Mg     2.0     08-08            Urine Studies:          RADIOLOGY & ADDITIONAL STUDIES:

## 2017-08-08 NOTE — PROGRESS NOTE ADULT - PROBLEM SELECTOR PLAN 2
Supratherapeutic INR.   - continue loading dose amiodarone 400mg daily. Anticipated to transition to amiodarone 200mg daily on 8/9  - continue to HOLD warfarin tonight  - daily INR  - HR goal <110 resolved. TTE done on 8/6 with findings unchanged compared to prior, pt to follow-up with outpatient cardiologist. Reviewed telemetry - no significant events. AICD interrogated by EP with no arrhythmias or shocks noted.  - no further inpatient w/u needed at this time

## 2017-08-08 NOTE — PROGRESS NOTE ADULT - SUBJECTIVE AND OBJECTIVE BOX
CC: Chest pain    SUBJECTIVE / OVERNIGHT EVENTS:  No acute events overnight.    MEDICATIONS  (STANDING):  amiodarone    Tablet 400 milliGRAM(s) Oral daily  carvedilol 3.125 milliGRAM(s) Oral every 12 hours  isosorbide   mononitrate ER Tablet (IMDUR) 30 milliGRAM(s) Oral daily  aspirin  chewable 81 milliGRAM(s) Oral daily  predniSONE   Tablet 10 milliGRAM(s) Oral daily  pantoprazole    Tablet 40 milliGRAM(s) Oral before breakfast  mirtazapine 45 milliGRAM(s) Oral at bedtime  atorvastatin 20 milliGRAM(s) Oral at bedtime  latanoprost 0.005% Ophthalmic Solution 1 Drop(s) Both EYES at bedtime  furosemide    Tablet 40 milliGRAM(s) Oral daily  potassium chloride    Tablet ER 20 milliEquivalent(s) Oral daily    MEDICATIONS  (PRN):  acetaminophen   Tablet. 650 milliGRAM(s) Oral every 6 hours PRN mild, moderate pain      Vital Signs Last 24 Hrs  T(C): 37 (08 Aug 2017 04:49), Max: 37 (07 Aug 2017 14:52)  T(F): 98.6 (08 Aug 2017 04:49), Max: 98.6 (07 Aug 2017 14:52)  HR: 101 (08 Aug 2017 04:49) (71 - 101)  BP: 127/99 (08 Aug 2017 04:49) (98/69 - 127/99)  BP(mean): --  RR: 18 (08 Aug 2017 04:49) (16 - 18)  SpO2: 100% (08 Aug 2017 04:49) (100% - 100%)  CAPILLARY BLOOD GLUCOSE        I&O's Summary    07 Aug 2017 07:01  -  08 Aug 2017 07:00  --------------------------------------------------------  IN: 580 mL / OUT: 200 mL / NET: 380 mL            LABS:                        12.1   9.69  )-----------( 227      ( 08 Aug 2017 05:08 )             37.0     08-08    141  |  105  |  18  ----------------------------<  99  4.2   |  22  |  0.99    Ca    9.2      08 Aug 2017 05:08  Phos  2.3     08-07  Mg     2.0     08-08      PT/INR - ( 08 Aug 2017 05:08 )   PT: 40.2 SEC;   INR: 3.50                    RADIOLOGY & ADDITIONAL TESTS:    Imaging Personally Reviewed:  Consultant(s) Notes Reviewed:    Care Discussed with Consultants/Other Providers: CC: Chest pain    SUBJECTIVE / OVERNIGHT EVENTS:  No acute events overnight. Patient reports no chest pain or SOB.    MEDICATIONS  (STANDING):  amiodarone    Tablet 400 milliGRAM(s) Oral daily  carvedilol 3.125 milliGRAM(s) Oral every 12 hours  isosorbide   mononitrate ER Tablet (IMDUR) 30 milliGRAM(s) Oral daily  aspirin  chewable 81 milliGRAM(s) Oral daily  predniSONE   Tablet 10 milliGRAM(s) Oral daily  pantoprazole    Tablet 40 milliGRAM(s) Oral before breakfast  mirtazapine 45 milliGRAM(s) Oral at bedtime  atorvastatin 20 milliGRAM(s) Oral at bedtime  latanoprost 0.005% Ophthalmic Solution 1 Drop(s) Both EYES at bedtime  furosemide    Tablet 40 milliGRAM(s) Oral daily  potassium chloride    Tablet ER 20 milliEquivalent(s) Oral daily    MEDICATIONS  (PRN):  acetaminophen   Tablet. 650 milliGRAM(s) Oral every 6 hours PRN mild, moderate pain      Vital Signs Last 24 Hrs  T(C): 37 (08 Aug 2017 04:49), Max: 37 (07 Aug 2017 14:52)  T(F): 98.6 (08 Aug 2017 04:49), Max: 98.6 (07 Aug 2017 14:52)  HR: 101 (08 Aug 2017 04:49) (71 - 101)  BP: 127/99 (08 Aug 2017 04:49) (98/69 - 127/99)  BP(mean): --  RR: 18 (08 Aug 2017 04:49) (16 - 18)  SpO2: 100% (08 Aug 2017 04:49) (100% - 100%)  CAPILLARY BLOOD GLUCOSE        I&O's Summary    07 Aug 2017 07:01  -  08 Aug 2017 07:00  --------------------------------------------------------  IN: 580 mL / OUT: 200 mL / NET: 380 mL      PHYSICAL EXAM  GENERAL: Elderly man sitting comfortably in a chair  EYES: EOMI, conjunctiva and sclera clear  NECK: Supple  CHEST/LUNG: Clear to auscultation bilaterally; No wheeze  HEART: mildly tachycardic, regular rhythm; +systolic murmur  ABDOMEN: Soft, Nontender, Nondistended; Bowel sounds present  EXTREMITIES:  2+ Peripheral Pulses, No clubbing, cyanosis. Mild pitting edema along R ankle and foot. Contracted RUE in flexed position.  NEURO: alert. Expressive aphasia. R facial droop        LABS:                        12.1   9.69  )-----------( 227      ( 08 Aug 2017 05:08 )             37.0     08-08    141  |  105  |  18  ----------------------------<  99  4.2   |  22  |  0.99    Ca    9.2      08 Aug 2017 05:08  Phos  2.3     08-07  Mg     2.0     08-08      PT/INR - ( 08 Aug 2017 05:08 )   PT: 40.2 SEC;   INR: 3.50           RADIOLOGY & ADDITIONAL TESTS:    Reviewed telemetry - no overnight events recorded    Consultant(s) Notes Reviewed:  Yes  Care Discussed with Consultants/Other Providers: Yes

## 2017-08-09 ENCOUNTER — TRANSCRIPTION ENCOUNTER (OUTPATIENT)
Age: 72
End: 2017-08-09

## 2017-08-09 LAB
BUN SERPL-MCNC: 24 MG/DL — HIGH (ref 7–23)
CALCIUM SERPL-MCNC: 9.3 MG/DL — SIGNIFICANT CHANGE UP (ref 8.4–10.5)
CHLORIDE SERPL-SCNC: 105 MMOL/L — SIGNIFICANT CHANGE UP (ref 98–107)
CO2 SERPL-SCNC: 23 MMOL/L — SIGNIFICANT CHANGE UP (ref 22–31)
CREAT SERPL-MCNC: 1.2 MG/DL — SIGNIFICANT CHANGE UP (ref 0.5–1.3)
GAS PNL BLDMV: SIGNIFICANT CHANGE UP
GLUCOSE SERPL-MCNC: 111 MG/DL — HIGH (ref 70–99)
HCT VFR BLD CALC: 34.9 % — LOW (ref 39–50)
HGB BLD-MCNC: 11.5 G/DL — LOW (ref 13–17)
INR BLD: 2.44 — HIGH (ref 0.88–1.17)
MCHC RBC-ENTMCNC: 33 % — SIGNIFICANT CHANGE UP (ref 32–36)
MCHC RBC-ENTMCNC: 35.1 PG — HIGH (ref 27–34)
MCV RBC AUTO: 106.4 FL — HIGH (ref 80–100)
NRBC # FLD: 0.08 — SIGNIFICANT CHANGE UP
PLATELET # BLD AUTO: 251 K/UL — SIGNIFICANT CHANGE UP (ref 150–400)
PMV BLD: 10.6 FL — SIGNIFICANT CHANGE UP (ref 7–13)
POTASSIUM SERPL-MCNC: 4.2 MMOL/L — SIGNIFICANT CHANGE UP (ref 3.5–5.3)
POTASSIUM SERPL-SCNC: 4.2 MMOL/L — SIGNIFICANT CHANGE UP (ref 3.5–5.3)
PROTHROM AB SERPL-ACNC: 27.8 SEC — HIGH (ref 9.8–13.1)
RBC # BLD: 3.28 M/UL — LOW (ref 4.2–5.8)
RBC # FLD: 17.4 % — HIGH (ref 10.3–14.5)
SODIUM SERPL-SCNC: 142 MMOL/L — SIGNIFICANT CHANGE UP (ref 135–145)
WBC # BLD: 10.32 K/UL — SIGNIFICANT CHANGE UP (ref 3.8–10.5)
WBC # FLD AUTO: 10.32 K/UL — SIGNIFICANT CHANGE UP (ref 3.8–10.5)

## 2017-08-09 PROCEDURE — 99239 HOSP IP/OBS DSCHRG MGMT >30: CPT

## 2017-08-09 RX ORDER — AMIODARONE HYDROCHLORIDE 400 MG/1
400 TABLET ORAL DAILY
Qty: 0 | Refills: 0 | Status: COMPLETED | OUTPATIENT
Start: 2017-08-10 | End: 2017-08-10

## 2017-08-09 RX ORDER — WARFARIN SODIUM 2.5 MG/1
1 TABLET ORAL
Qty: 0 | Refills: 0 | COMMUNITY

## 2017-08-09 RX ORDER — PANTOPRAZOLE SODIUM 20 MG/1
1 TABLET, DELAYED RELEASE ORAL
Qty: 60 | Refills: 0 | OUTPATIENT
Start: 2017-08-09 | End: 2017-10-08

## 2017-08-09 RX ORDER — AMIODARONE HYDROCHLORIDE 400 MG/1
200 TABLET ORAL DAILY
Qty: 0 | Refills: 0 | Status: DISCONTINUED | OUTPATIENT
Start: 2017-08-11 | End: 2017-08-10

## 2017-08-09 RX ORDER — CARVEDILOL PHOSPHATE 80 MG/1
1 CAPSULE, EXTENDED RELEASE ORAL
Qty: 120 | Refills: 0 | OUTPATIENT
Start: 2017-08-09 | End: 2017-10-08

## 2017-08-09 RX ADMIN — AMIODARONE HYDROCHLORIDE 400 MILLIGRAM(S): 400 TABLET ORAL at 05:52

## 2017-08-09 RX ADMIN — ISOSORBIDE MONONITRATE 30 MILLIGRAM(S): 60 TABLET, EXTENDED RELEASE ORAL at 12:02

## 2017-08-09 RX ADMIN — Medication 10 MILLIGRAM(S): at 05:52

## 2017-08-09 RX ADMIN — PANTOPRAZOLE SODIUM 40 MILLIGRAM(S): 20 TABLET, DELAYED RELEASE ORAL at 05:52

## 2017-08-09 RX ADMIN — ATORVASTATIN CALCIUM 20 MILLIGRAM(S): 80 TABLET, FILM COATED ORAL at 21:41

## 2017-08-09 RX ADMIN — CARVEDILOL PHOSPHATE 6.25 MILLIGRAM(S): 80 CAPSULE, EXTENDED RELEASE ORAL at 05:52

## 2017-08-09 RX ADMIN — Medication 81 MILLIGRAM(S): at 12:02

## 2017-08-09 RX ADMIN — MIRTAZAPINE 45 MILLIGRAM(S): 45 TABLET, ORALLY DISINTEGRATING ORAL at 21:41

## 2017-08-09 RX ADMIN — LATANOPROST 1 DROP(S): 0.05 SOLUTION/ DROPS OPHTHALMIC; TOPICAL at 21:41

## 2017-08-09 RX ADMIN — Medication 40 MILLIGRAM(S): at 05:52

## 2017-08-09 RX ADMIN — Medication 20 MILLIEQUIVALENT(S): at 12:06

## 2017-08-09 RX ADMIN — CARVEDILOL PHOSPHATE 6.25 MILLIGRAM(S): 80 CAPSULE, EXTENDED RELEASE ORAL at 18:06

## 2017-08-09 NOTE — PROGRESS NOTE ADULT - PROBLEM SELECTOR PLAN 1
INR therapeutic. Completed loading dose of amiodarone.  - start maintenance dosing of amiodarone 200mg daily today  - carvedilol 6.25mg q12h  - restart warfarin at lower dose, warfarin 2.5mg tonight  - daily INR  - HR goal <110 INR therapeutic. Spoke with the office of patient's cardiologist, Dr. Andres Rodriguez - pt to continue amiodarone dosing of 400mg daily. Patient has f/u appt scheduled for tomorrow 8/10 at 1:45pm  - Continue carvedilol 6.25mg q12h  - pt to resume warfarin tonight at 2.5mg  - INR to be f/u by his outpatient PCP  - daily INR  - HR goal <110

## 2017-08-09 NOTE — DISCHARGE NOTE ADULT - HOME CARE AGENCY
EMANUELA to resume 8/10/17 confirmed by Discharge Planner Eh from [facility] [phone]

## 2017-08-09 NOTE — PROGRESS NOTE ADULT - PROBLEM SELECTOR PLAN 4
Being followed by nephrology, appreciate recs  - renally dose medications  - Lasix 40mg daily, potassium chloride 20mEq daily as per nephrology  - will like d/c KCl when patient is restarted on ACEi. Being followed by nephrology, appreciate recs  - renally dose medications  - Lasix 40mg daily, potassium chloride daily as per nephrology  - will like d/c KCl when patient is restarted on ACEi.

## 2017-08-09 NOTE — DISCHARGE NOTE ADULT - HOSPITAL COURSE
HPI:  73 y/o male, with a PmHx of CHF with an EF of 20%, AICD, Prostate Ca s/p seed implants, Afib on ac, CVA with residual right hemiparesis, presented to the ED c/o chest pain and right LE pain. Pt is a poor historian (difficult to understand secondary to the old cva and has residual expressive aphasia and he had trouble remembering certain things). Pt was recently at his PMD's office Dr. Rodriguez for an issue with his medications. Dr. Reese stated the patient was given coreg 15.625mg bid of Coreg instead of 3.125mg po bid and because of some issues with pharmacy he did not get his prescription of Amiodarone at that time after being switched from Digoxin. Today, he states overnight he had a right sided chest pain with some nausea and vomiting and right calf pain so he came to the [facility] ED today for an evaluation. Pt couldn't describe the pain but still has a RLE pain. He denies any fever, chills, sob, blurred vision, dizziness. He states he gets HA's a lot. Attempted to call his wife whom he lives with but was not answering her phone. Pt had a recent stress test done on 7/31/17 that was positive. He is being admitted to telemetry for further management. Findings discussed with Dr. Gilliam. (03 Aug 2017 15:51)      HOSPITAL COURSE:  Vital Signs Last 24 Hrs  T(C): 37 (09 Aug 2017 05:50), Max: 37 (08 Aug 2017 21:43)  T(F): 98.6 (09 Aug 2017 05:50), Max: 98.6 (08 Aug 2017 21:43)  HR: 73 (09 Aug 2017 05:50) (65 - 78)  BP: 107/76 (09 Aug 2017 05:50) (86/54 - 107/76)  BP(mean): --  RR: 18 (09 Aug 2017 05:50) (18 - 18)  SpO2: 100% (09 Aug 2017 05:50) (100% - 100%)    73 y/o male, with a PmHx of CHF, AICD, Afib on ac, CVA w/residual right hemiparesis, Temporal Arteritis, presented with right sided shoulder, chest and abdominal pain  EKG: A-paced @ 63  CE x 2 (Trop: 0.09-->0.12)                    WBC: 10.88                  Na: 133  Glucose: 149                    BNP: 4762                 Lactate: 3.8-->3.7  8/2 CXR - clear lungs  8/2 RLE US - no evidence of right lower extremity deep vein thrombosis  8/3 CTA Chest/Abd/Pelvis - no aortic dissection. Limited evaluation of the distal abdominal aorta and its branches as described. No central pulmonary embolism. Bibasilar subsegmental atelectasis. Nonspecific gallbladder wall edema/pericholecystic fluid. Further characterization with ultrasoud or HIDA scan may be considered.  8/3 House Cardio c/s - patient with elevated tropnins in the setting of BRYCE and ekg with v-paced rhythm with no ischemic changes. Chest pain free. Kehinde score of 3. Monitor on tele. Serial ekg, prn chest pain. echo evaluate LV function and wall motion abnormality. Consider ischemic eval.  8/3 Renal c/s Dr. Ashton - bmp daily, dose new meds for GFR 30-40ml/min, urine lytes, Cr, UA, protein, SPEP, immunofixation, LDH, beta-hydroxybutrate, lactate, ischemic w/u per cardio; if to require cardiac cath, would look to give mucomyst 1200mg po bid x 4 doses as well as 1/2NS = 75meq NaHCO3 peroprocedurally (250cc pre-cath and 125cc/h x 4 hours post-cath), no need for treatment with HCO3 for now for metabolic acidosis, no ivf or diuretics for now, efforts to obtain recent bloodwork/find out cr from winter/spring of 2017  8/3 Med - pt with chronic hypotension, pt with dec pulses LE - will await arterial dopplers, dose coumadin, follow INR, check orthostatic hypotension, restart cardiac meds as sbp tolerates  8/4 B/L LE Arterial US - Right SUBHA is normal (1.12). Right toe waveforms are absent. Findings suggest the presence of small vessel arterial disease in the right foot. Left SUBHA is normal (1.05). TBI on the left is normal (0.81). Left toe pressure is 100mm Hg. No evidence of significant occlusion arterial disease in the left lower extremity.     8/5 Renal: BMP daily, F/U TTE, Dose new meds for GFR 40-50ml/min. If cardiac cath/LE angio required, suggest mucomyst 1200mg po bid x 4 doses as well as 1/2NS + 75meq/L NaHCO3 periprocedurally (250 cc pre-cath and 125cc/h x 4 hours post-cath), A/w KCL 40meq x1 dose repletion  8/5 Med: Chest pain. Unclear etiology. Not related to food and patient not able to express precipitating factors. 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 Normal CK. ACS r/o. However, as per HPI note, reported recent history of positive stress test. However, unsure if patient is a good candidate for invasive cardiac w/u. pending repeat TTE. If unchanged compared to prior, pt to follow-up with outpatient cardiologist. will need to consult EP to interrogate AICD after weekend. Unclear etiology. Not related to food and patient not able to express precipitating factors. 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 Normal CK. ACS r/o. However, as per HPI note, reported recent history of positive stress test. However, unsure if patient is a good candidate for invasive cardiac w/u.  - pending repeat TTE. If unchanged compared to prior, pt to follow-up with outpatient cardiologist  - will need to consult EP tomorrow to interrogate AICD.  -Acute renal failure superimposed on stage 3 chronic kidney disease, unspecified acute renal failure type.  Creatinine improved with IV fluids. Prerenal etiology. Nephrology consulted, appreciate recs  - trend Cr - renally dose meds. Atrial fibrillation. therapeutic INR. Patient recently started on amiodarone which is known to increase effects of warfarin so will closely monitor - loading dose of amiodarone 400mg daily - warfarin 5mg daily - daily INR - HR goal <110Subtherapeutic INR. Patient recently started on amiodarone which is known to increase effects of warfarin so will closely monitor  - loading dose of amiodarone 400mg daily  - warfarin 5mg daily  - daily INR  - HR goal <110. Chronic systolic heart failure. Patient with ischemic cardiomyopathy s/p AICD placement - continue home regimen of isosorbide mononitrate, lisinopril, and carvedilol - continue atorvastatin.   8/6 Cards: Repeat TTE, EP to interrogate AICD  08/06 echo- EF 14%, 1. Tethered mitral valve leaflets with normal opening.  Mild-moderate mitral regurgitation. Severe concentric left ventricular hypertrophy.  . Severe global left ventricular systolic dysfunction. Moderate right atrial enlargement. normal right ventricular size with decreased right ventricular systolic function. A device wire is noted in the right heart.    8/7- Medical attending note -  TTE done on 8/6 with findings unchanged compared to prior, pt to follow-up with outpatient cardiologist. Reviwed telemetry - Intermittent PVCs only.- EP  to interrogate AICD.   Atrial fibrillation.  Plan: Supratherapeutic INR. - continue loading dose amiodarone 400mg daily. Anticipated to transition to amiodarone 200mg daily on 8/9- HOLD warfarin tonight  - daily INR - HR goal <110.     8/8 Med: continue loading dose amiodarone 400mg daily. Anticipated to transition to maintenance dosing of amiodarone 200mg daily on 8/9, - increase to carvedilol 6.25mg q12h, continue to HOLD warfarin tonight, Lisinopril was held in the setting of BRYCE.Plan to resume lisinopril tomorrow as long as renal function remains stable at baseline  8/8       Patient currently denies chest pain, sob, palpitations, dizziness or lightheadedness. Case d/w attending (Dr. Ball): Pt to be dc'd home and F/U with his PCP, Cardiologist and Nephrologist as instructed. Patient was admitted for further evaluation of chest pain. He was seen and evaluated by cardiology and placed on telemetry floor for continued cardiac monitoring. Patient was noted to have troponinemia, chronic in nature in the setting of underlying CKD that was stable with repeat measurements x3 with normal CK and EKG w/o signs of acute ischemia, ruling out ACS. CXR was unremarkable.  Repeat TTE was performed which was unchanged compared to prior. EP interrogated patient's AICD which showed normal BiV- ICD function, normal sensing and pacing via iterative testing, and good battery status    Patient was also noted to have BRYCE on CKD on presentation, which was attributed to prerenal etiology that improved with IV hydration. His lisinopril was HELD in the setting of BRYCE. He was followed by nephrology and started on furosemide 40mg daily with supplemental KCl since he appeared to have signs of hypervolemia (increased JVD and LE edema). He was recommended to follow-up with outpatient cardiology and PCP to resume lisinopril once Cr back at his baseline and to re-evaluate if continued potassium supplementation is needed once patient is back on ACEi.     Hospital course was otherwise notable for supratherapeutic INR while on amiodarone. Therefore, he coumadin was held. On the day of discharge, his INR was therapeutic at 2.44. He is to resume warfarin at a lower dose, warfarin 2.5mg daily, to be followed by his outpatient physicians. Patient was advised to continue his amiodarone dose of 400mg daily as prescribed by his cardiologist, Dr. Rodriguez.     Patient currently denies chest pain, sob, palpitations, dizziness or lightheadedness. Case d/w attending (Dr. Ball): Pt to be dc'd home and F/U with his PCP, Cardiologist and Nephrologist as instructed. Patient was admitted for further evaluation of chest pain. He was seen and evaluated by cardiology and placed on telemetry floor for continued cardiac monitoring. Patient was noted to have troponinemia, chronic in nature in the setting of underlying CKD that was stable with repeat measurements x3 with normal CK and EKG w/o signs of acute ischemia, ruling out ACS. CXR was unremarkable.  Repeat TTE was performed which was unchanged compared to prior. EP interrogated patient's AICD which showed normal BiV- ICD function, normal sensing and pacing via iterative testing, and good battery status    Patient was also noted to have BRYCE on CKD on presentation, which was attributed to prerenal etiology that improved with IV hydration. His lisinopril was HELD in the setting of BRYCE. He was followed by nephrology and started on furosemide 40mg daily with supplemental KCl since he appeared to have signs of hypervolemia (increased JVD and LE edema). He was recommended to follow-up with outpatient cardiology and PCP to resume lisinopril once Cr back at his baseline and to re-evaluate if continued potassium supplementation is needed once patient is back on ACEi.     Hospital course was otherwise notable for supratherapeutic INR while on amiodarone. Therefore, he coumadin was held. On the day of discharge, his INR was therapeutic at 2.44. He is to resume warfarin at a lower dose, warfarin 2.5mg daily, to be followed by his outpatient physicians. Patient was advised to continue his amiodarone dose of 400mg daily as prescribed by his cardiologist, Dr. Rodriguez. He carvedilol dose was also increased from 3.125mg BID to 6.25mg BID for better HR control since pt was noted to have HR in the low 100s on telemetry.    Patient currently denies chest pain, sob, palpitations, dizziness or lightheadedness. Case d/w attending (Dr. Ball): Pt to be dc'd home and F/U with his PCP, Cardiologist and Nephrologist as instructed. Patient was admitted for further evaluation of chest pain. He was seen and evaluated by cardiology and placed on telemetry floor for continued cardiac monitoring. Patient was noted to have troponinemia, chronic in nature in the setting of underlying CKD that was stable with repeat measurements x3 with normal CK and EKG w/o signs of acute ischemia, ruling out ACS. CXR was unremarkable.  Repeat TTE was performed which was unchanged compared to prior. EP interrogated patient's AICD which showed normal BiV- ICD function, normal sensing and pacing via iterative testing, and good battery status    Patient was also noted to have BRYCE on CKD on presentation, which was attributed to prerenal etiology that improved with IV hydration. His lisinopril was HELD in the setting of BRYCE. He was followed by nephrology and started on furosemide 40mg daily with supplemental KCl since he appeared to have signs of hypervolemia (increased JVD and LE edema). He was recommended to follow-up with outpatient cardiology and PCP to resume lisinopril once Cr back at his baseline and to re-evaluate if continued potassium supplementation is needed once patient is back on ACEi. Also, as per nephrology recommendations, patient is to be assessed for possible increase in furosemide to BID dosing.    Hospital course was otherwise notable for supratherapeutic INR while on amiodarone. Therefore, he coumadin was held. On the day of discharge, his INR was therapeutic at 2.44. He is to resume warfarin at a lower dose, warfarin 2.5mg daily, to be followed by his outpatient physicians. Patient was advised to continue his amiodarone dose of 400mg daily as prescribed by his cardiologist, Dr. Rodriguez. He carvedilol dose was also increased from 3.125mg BID to 6.25mg BID for better HR control since pt was noted to have HR in the low 100s on telemetry.    Patient currently denies chest pain, sob, palpitations, dizziness or lightheadedness. Case d/w attending (Dr. Ball): Pt to be dc'd home and F/U with his PCP, Cardiologist and Nephrologist as instructed.

## 2017-08-09 NOTE — PROGRESS NOTE ADULT - ASSESSMENT
72M w/ AFib, temporal arteritis, and HFrEF (20%), 8/3/17 a/w chest pain and BRYCE.  CKD stage 3    (1)Renal - BRYCE on CKD - Stable renal function.    (2)Metabolic acidosis - likely BRYCE associated; resolved.    (3)Chest pain - low suspicion for ACS per Cardiology. Post TTE.    (4)Vascular - likely RLE small vessel disease based on ABIs           RECOMMEND:  1.  BMP daily  2.  Dose new meds for GFR 40-50ml/min  3.  Can the Coreg dose be increased.  Once heart rate improved then ACE or ARB  4.  Cont lasix 40mg po qd KDUR as well.(pt c JVD)  Can increase the dose to BID as outpt as well

## 2017-08-09 NOTE — DISCHARGE NOTE ADULT - PLAN OF CARE
resolution of symptoms Take medications as instructed on discharge  Follow up with your PCP within 1 week   Follow up with your Cardiologist within 2 weeks Take medications as instructed on discharge  Follow up with your PCP within 1 week   Follow up with Nephrologist within 2-3 weeks Take medications as instructed on discharge  Follow up with your PCP within 1 week Take medications as instructed on discharge  Follow up with your PCP (Dr. Rosa Elena Eubanks) this Friday 8/11/17 at 10am for Follow UP AND INR CHECK.   -Address: [address] [place] [address], [place], [place] Take medications as instructed on discharge  Follow up with your PCP within 1 week   Follow up with your Cardiologist within 2 week   Weigh yourself daily if gained weight more than 2 lbs within 3 days call your cardiologist right away.  Low sodium diet Take medications as instructed on discharge  Follow up with your PCP (Dr. Rosa Elena Eubanks) this Friday 8/11/17 at 10am for Follow UP AND INR CHECK.   -Address: [address] [place] [address]  Follow-up with your cardiologist, Dr. Rodriguez [phone]. You have an appointment scheduled tomorrow, 8/10/17 at 1:45pm.

## 2017-08-09 NOTE — DISCHARGE NOTE ADULT - MEDICATION SUMMARY - MEDICATIONS TO CHANGE
I will SWITCH the dose or number of times a day I take the medications listed below when I get home from the hospital:    carvedilol 3.125 mg oral tablet  -- 1 tab(s) by mouth 2 times a day I will SWITCH the dose or number of times a day I take the medications listed below when I get home from the hospital:    carvedilol 3.125 mg oral tablet  -- 1 tab(s) by mouth 2 times a day    warfarin 5 mg oral tablet  -- 1 tab(s) by mouth once a day (at bedtime) I will SWITCH the dose or number of times a day I take the medications listed below when I get home from the hospital:    furosemide 40 mg oral tablet  -- 1 tab(s) by mouth 2 times a day    carvedilol 3.125 mg oral tablet  -- 1 tab(s) by mouth 2 times a day    warfarin 5 mg oral tablet  -- 1 tab(s) by mouth once a day (at bedtime)

## 2017-08-09 NOTE — DISCHARGE NOTE ADULT - MEDICATION SUMMARY - MEDICATIONS TO STOP TAKING
I will STOP taking the medications listed below when I get home from the hospital:    lisinopril 10 mg oral tablet  -- 1 tab(s) by mouth once a day    famotidine 20 mg oral tablet  -- 1 tab(s) by mouth once a day, As Needed

## 2017-08-09 NOTE — DISCHARGE NOTE ADULT - MEDICATION SUMMARY - MEDICATIONS TO TAKE
I will START or STAY ON the medications listed below when I get home from the hospital:    predniSONE 10 mg oral tablet  -- 1 tab(s) by mouth once a day  -- Indication: For Temporal arteritis    tamsulosin 0.4 mg oral capsule  -- 1 cap(s) by mouth once a day (at bedtime)  -- Indication: For Prostate cancer    isosorbide mononitrate 30 mg oral tablet, extended release  -- 1 tab(s) by mouth once a day (in the morning)  -- Indication: For Chest pain    amiodarone 200 mg oral tablet  -- 2 tab(s) by mouth once a day for 1 week and then 1 tab(s) by mouth once a day  -- Indication: For Atrial fibrillation    warfarin 5 mg oral tablet  -- 1 tab(s) by mouth once a day (at bedtime)  -- Indication: For Atrial fibrillation    mirtazapine 45 mg oral tablet  -- 1 tab(s) by mouth once a day (at bedtime), As Needed  -- Indication: For Depression    atorvastatin 20 mg oral tablet  -- 1 tab(s) by mouth once a day  -- Indication: For Hyperlipidemia    carvedilol 6.25 mg oral tablet  -- 1 tab(s) by mouth every 12 hours  -- Indication: For Chronic systolic heart failure    furosemide 40 mg oral tablet  -- 1 tab(s) by mouth 2 times a day  -- Indication: For Chronic systolic heart failure    potassium chloride 10 mEq oral tablet, extended release  -- 1 tab(s) by mouth once a day  -- Indication: For Supplement    Lumigan 0.01% ophthalmic solution  -- 1 drop(s) to each affected eye once a day (in the evening)  -- Indication: For Glaucoma    pantoprazole 40 mg oral delayed release tablet  -- 1 tab(s) by mouth once a day (before a meal)  -- Indication: For GERD I will START or STAY ON the medications listed below when I get home from the hospital:    predniSONE 10 mg oral tablet  -- 1 tab(s) by mouth once a day  -- Indication: For Temporal arteritis    tamsulosin 0.4 mg oral capsule  -- 1 cap(s) by mouth once a day (at bedtime)  -- Indication: For Prostate cancer    isosorbide mononitrate 30 mg oral tablet, extended release  -- 1 tab(s) by mouth once a day (in the morning)  -- Indication: For Chest pain    amiodarone 400 mg oral tablet  -- 1 tab(s) by mouth once a day  -- Indication: For Atrial fibrillation    warfarin 2.5 mg oral tablet  -- 1 tab(s) by mouth once a day  -- Indication: For Atrial fibrillation    mirtazapine 45 mg oral tablet  -- 1 tab(s) by mouth once a day (at bedtime), As Needed  -- Indication: For Depression    atorvastatin 20 mg oral tablet  -- 1 tab(s) by mouth once a day  -- Indication: For Hyperlipidemia    carvedilol 6.25 mg oral tablet  -- 1 tab(s) by mouth every 12 hours  -- Indication: For Chronic systolic heart failure    furosemide 40 mg oral tablet  -- 1 tab(s) by mouth 2 times a day  -- Indication: For Chronic systolic heart failure    potassium chloride 10 mEq oral tablet, extended release  -- 1 tab(s) by mouth once a day  -- Indication: For Supplement    Lumigan 0.01% ophthalmic solution  -- 1 drop(s) to each affected eye once a day (in the evening)  -- Indication: For Glaucoma    pantoprazole 40 mg oral delayed release tablet  -- 1 tab(s) by mouth once a day (before a meal)  -- Indication: For GERD I will START or STAY ON the medications listed below when I get home from the hospital:    predniSONE 10 mg oral tablet  -- 1 tab(s) by mouth once a day  -- Indication: For Temporal arteritis    tamsulosin 0.4 mg oral capsule  -- 1 cap(s) by mouth once a day (at bedtime)  -- Indication: For Prostate cancer    isosorbide mononitrate 30 mg oral tablet, extended release  -- 1 tab(s) by mouth once a day (in the morning)  -- Indication: For Chest pain    amiodarone 400 mg oral tablet  -- 1 tab(s) by mouth 2 times a day  -- Indication: For Atrial fibrillation    warfarin 2.5 mg oral tablet  -- 1 tab(s) by mouth once a day  -- Indication: For Atrial fibrillation    mirtazapine 45 mg oral tablet  -- 1 tab(s) by mouth once a day (at bedtime), As Needed  -- Indication: For Depression    atorvastatin 20 mg oral tablet  -- 1 tab(s) by mouth once a day  -- Indication: For Hyperlipidemia    carvedilol 6.25 mg oral tablet  -- 1 tab(s) by mouth every 12 hours  -- Indication: For Chronic systolic heart failure    furosemide 40 mg oral tablet  -- 1 tab(s) by mouth once a day  -- Indication: For Chronic systolic heart failure and chronic kidney disease    potassium chloride 10 mEq oral tablet, extended release  -- 1 tab(s) by mouth once a day  -- Indication: For Supplement    Lumigan 0.01% ophthalmic solution  -- 1 drop(s) to each affected eye once a day (in the evening)  -- Indication: For Glaucoma    pantoprazole 40 mg oral delayed release tablet  -- 1 tab(s) by mouth once a day (before a meal)  -- Indication: For GERD

## 2017-08-09 NOTE — DISCHARGE NOTE ADULT - SECONDARY DIAGNOSIS.
Depression Atrial fibrillation Chronic systolic heart failure Stage 3 chronic kidney disease CVA (cerebral vascular accident) GERD (gastroesophageal reflux disease)

## 2017-08-09 NOTE — DISCHARGE NOTE ADULT - CARE PROVIDER_API CALL
Rosa Elena Eubanks (Cardiologist)  [address] [place] [address].  Phone: (   )    -  Fax: (   )    -    Daisy Kerr), Internal Medicine; Nephrology  [address] [address]  Phone: [phone]  Fax: [phone] Daisy Kerr), Internal Medicine; Nephrology  [address] [facility]  [address]  [place] [address]  Phone: [phone]  Fax: [phone]    Rosa Elena Eubanks (Cardiologist)  [address], [place], [place].  Phone: (   )    -  Fax: (   )    -    Andres Rodriguez (MD; MBBS), Cardiovascular Disease; Internal Medicine; Nuclear Cardiology  [address] [facility]  [address]  [place] [address]  Phone: [phone]  Fax: [phone]

## 2017-08-09 NOTE — DISCHARGE NOTE ADULT - ADDITIONAL INSTRUCTIONS
Follow with Dr. Rosa Elena Eubanks (PCP) this Friday 8/11/17 at 10am for Follow UP AND INR CHECK.   -Address: [address] [place] [address], [place], [place].  Follow up with Cardiologist: Dr. Andres Rodriguez [phone] within 2-3 weeks  Follow up with Nehprologist: Dr. Daisy Kerr within 2 weeks.

## 2017-08-09 NOTE — PROGRESS NOTE ADULT - ASSESSMENT
71 y/o male, with a PmHx of CHF with an EF of 20%, AICD, Prostate Ca s/p seed implants, Afib on ac, and CVA with residual right hemiparesis admitted for evaluation of chest pain. Cardiac w/u negative for ACS and patient w/o subsequent episodes of chest pain, but with supratherapeutic INR. 71 y/o male, with a PmHx of CHF with an EF of 20%, AICD, Prostate Ca s/p seed implants, Afib on ac, and CVA with residual right hemiparesis admitted for evaluation of chest pain. Cardiac w/u negative for ACS and patient w/o subsequent episodes of chest pain, now with INR in therapeutic range

## 2017-08-09 NOTE — DISCHARGE NOTE ADULT - PATIENT PORTAL LINK FT
“You can access the [facility] Patient Portal, offered by [facility], by registering with the following website: [email or web address]”

## 2017-08-09 NOTE — PROGRESS NOTE ADULT - PROBLEM SELECTOR PLAN 8
- on chronic AC therapy - on chronic AC therapy    DISPO: Patient evaluated by PT. Appropriate to be d/c home with services and home PT. Med rec and discharge summary complete. Case discuss with CM and SW. Time spent in discharge planning and coordinating outpatient care: 45 minutes.

## 2017-08-09 NOTE — DISCHARGE NOTE ADULT - CARE PLAN
Principal Discharge DX:	Chest pain  Goal:	resolution of symptoms  Instructions for follow-up, activity and diet:	Take medications as instructed on discharge  Follow up with your PCP within 1 week   Follow up with your Cardiologist within 2 weeks  Secondary Diagnosis:	Depression  Instructions for follow-up, activity and diet:	Take medications as instructed on discharge  Follow up with your PCP within 1 week  Secondary Diagnosis:	Atrial fibrillation  Instructions for follow-up, activity and diet:	Take medications as instructed on discharge  Follow up with your PCP (Dr. Rosa Elena Eubanks) this Friday 8/11/17 at 10am for Follow UP AND INR CHECK.   -Address: [address] [place] [address]  Secondary Diagnosis:	Chronic systolic heart failure  Instructions for follow-up, activity and diet:	Take medications as instructed on discharge  Follow up with your PCP within 1 week   Follow up with your Cardiologist within 2 week   Weigh yourself daily if gained weight more than 2 lbs within 3 days call your cardiologist right away.  Low sodium diet  Secondary Diagnosis:	Stage 3 chronic kidney disease  Instructions for follow-up, activity and diet:	Take medications as instructed on discharge  Follow up with your PCP within 1 week   Follow up with Nephrologist within 2-3 weeks  Secondary Diagnosis:	CVA (cerebral vascular accident)  Instructions for follow-up, activity and diet:	Take medications as instructed on discharge  Follow up with your PCP within 1 week  Secondary Diagnosis:	GERD (gastroesophageal reflux disease)  Instructions for follow-up, activity and diet:	Take medications as instructed on discharge  Follow up with your PCP within 1 week Principal Discharge DX:	Chest pain  Goal:	resolution of symptoms  Instructions for follow-up, activity and diet:	Take medications as instructed on discharge  Follow up with your PCP within 1 week   Follow up with your Cardiologist within 2 weeks  Secondary Diagnosis:	Depression  Instructions for follow-up, activity and diet:	Take medications as instructed on discharge  Follow up with your PCP within 1 week  Secondary Diagnosis:	Atrial fibrillation  Instructions for follow-up, activity and diet:	Take medications as instructed on discharge  Follow up with your PCP (Dr. Rosa Elena Eubanks) this Friday 8/11/17 at 10am for Follow UP AND INR CHECK.   -Address: [address] [place] [address]  Secondary Diagnosis:	Chronic systolic heart failure  Instructions for follow-up, activity and diet:	Take medications as instructed on discharge  Follow up with your PCP within 1 week   Follow up with your Cardiologist within 2 week   Weigh yourself daily if gained weight more than 2 lbs within 3 days call your cardiologist right away.  Low sodium diet  Secondary Diagnosis:	Stage 3 chronic kidney disease  Instructions for follow-up, activity and diet:	Take medications as instructed on discharge  Follow up with your PCP within 1 week   Follow up with Nephrologist within 2-3 weeks  Secondary Diagnosis:	CVA (cerebral vascular accident)  Instructions for follow-up, activity and diet:	Take medications as instructed on discharge  Follow up with your PCP within 1 week  Secondary Diagnosis:	GERD (gastroesophageal reflux disease)  Instructions for follow-up, activity and diet:	Take medications as instructed on discharge  Follow up with your PCP within 1 week Principal Discharge DX:	Chest pain  Goal:	resolution of symptoms  Instructions for follow-up, activity and diet:	Take medications as instructed on discharge  Follow up with your PCP within 1 week   Follow up with your Cardiologist within 2 weeks  Secondary Diagnosis:	Depression  Instructions for follow-up, activity and diet:	Take medications as instructed on discharge  Follow up with your PCP within 1 week  Secondary Diagnosis:	Atrial fibrillation  Instructions for follow-up, activity and diet:	Take medications as instructed on discharge  Follow up with your PCP (Dr. Rosa Elena Eubanks) this Friday 8/11/17 at 10am for Follow UP AND INR CHECK.   -Address: [address] [place] [address]  Secondary Diagnosis:	Chronic systolic heart failure  Instructions for follow-up, activity and diet:	Take medications as instructed on discharge  Follow up with your PCP within 1 week   Follow up with your Cardiologist within 2 week   Weigh yourself daily if gained weight more than 2 lbs within 3 days call your cardiologist right away.  Low sodium diet  Secondary Diagnosis:	Stage 3 chronic kidney disease  Instructions for follow-up, activity and diet:	Take medications as instructed on discharge  Follow up with your PCP within 1 week   Follow up with Nephrologist within 2-3 weeks  Secondary Diagnosis:	CVA (cerebral vascular accident)  Instructions for follow-up, activity and diet:	Take medications as instructed on discharge  Follow up with your PCP within 1 week  Secondary Diagnosis:	GERD (gastroesophageal reflux disease)  Instructions for follow-up, activity and diet:	Take medications as instructed on discharge  Follow up with your PCP within 1 week Principal Discharge DX:	Chest pain  Goal:	resolution of symptoms  Instructions for follow-up, activity and diet:	Take medications as instructed on discharge  Follow up with your PCP within 1 week   Follow up with your Cardiologist within 2 weeks  Secondary Diagnosis:	Depression  Instructions for follow-up, activity and diet:	Take medications as instructed on discharge  Follow up with your PCP within 1 week  Secondary Diagnosis:	Atrial fibrillation  Instructions for follow-up, activity and diet:	Take medications as instructed on discharge  Follow up with your PCP (Dr. Rosa Elena Eubanks) this Friday 8/11/17 at 10am for Follow UP AND INR CHECK.   -Address: [address] [place] [address]  Follow-up with your cardiologist, Dr. Rodriguez [phone]. You have an appointment scheduled tomorrow, 8/10/17 at 1:45pm.  Secondary Diagnosis:	Chronic systolic heart failure  Instructions for follow-up, activity and diet:	Take medications as instructed on discharge  Follow up with your PCP within 1 week   Follow up with your Cardiologist within 2 week   Weigh yourself daily if gained weight more than 2 lbs within 3 days call your cardiologist right away.  Low sodium diet  Secondary Diagnosis:	Stage 3 chronic kidney disease  Instructions for follow-up, activity and diet:	Take medications as instructed on discharge  Follow up with your PCP within 1 week   Follow up with Nephrologist within 2-3 weeks  Secondary Diagnosis:	CVA (cerebral vascular accident)  Instructions for follow-up, activity and diet:	Take medications as instructed on discharge  Follow up with your PCP within 1 week  Secondary Diagnosis:	GERD (gastroesophageal reflux disease)  Instructions for follow-up, activity and diet:	Take medications as instructed on discharge  Follow up with your PCP within 1 week Principal Discharge DX:	Chest pain  Goal:	resolution of symptoms  Instructions for follow-up, activity and diet:	Take medications as instructed on discharge  Follow up with your PCP within 1 week   Follow up with your Cardiologist within 2 weeks  Secondary Diagnosis:	Depression  Instructions for follow-up, activity and diet:	Take medications as instructed on discharge  Follow up with your PCP within 1 week  Secondary Diagnosis:	Atrial fibrillation  Instructions for follow-up, activity and diet:	Take medications as instructed on discharge  Follow up with your PCP (Dr. Rosa Elena Eubanks) this Friday 8/11/17 at 10am for Follow UP AND INR CHECK.   -Address: [address] [place] [address]  Follow-up with your cardiologist, Dr. Rodriguez [phone]. You have an appointment scheduled tomorrow, 8/10/17 at 1:45pm.  Secondary Diagnosis:	Chronic systolic heart failure  Instructions for follow-up, activity and diet:	Take medications as instructed on discharge  Follow up with your PCP within 1 week   Follow up with your Cardiologist within 2 week   Weigh yourself daily if gained weight more than 2 lbs within 3 days call your cardiologist right away.  Low sodium diet  Secondary Diagnosis:	Stage 3 chronic kidney disease  Instructions for follow-up, activity and diet:	Take medications as instructed on discharge  Follow up with your PCP within 1 week   Follow up with Nephrologist within 2-3 weeks  Secondary Diagnosis:	CVA (cerebral vascular accident)  Instructions for follow-up, activity and diet:	Take medications as instructed on discharge  Follow up with your PCP within 1 week  Secondary Diagnosis:	GERD (gastroesophageal reflux disease)  Instructions for follow-up, activity and diet:	Take medications as instructed on discharge  Follow up with your PCP within 1 week Principal Discharge DX:	Chest pain  Goal:	resolution of symptoms  Instructions for follow-up, activity and diet:	Take medications as instructed on discharge  Follow up with your PCP within 1 week   Follow up with your Cardiologist within 2 weeks  Secondary Diagnosis:	Depression  Instructions for follow-up, activity and diet:	Take medications as instructed on discharge  Follow up with your PCP within 1 week  Secondary Diagnosis:	Atrial fibrillation  Instructions for follow-up, activity and diet:	Take medications as instructed on discharge  Follow up with your PCP (Dr. Rosa Elena Eubanks) this Friday 8/11/17 at 10am for Follow UP AND INR CHECK.   -Address: [address] [place] [address]  Follow-up with your cardiologist, Dr. Rodriguez [phone]. You have an appointment scheduled tomorrow, 8/10/17 at 1:45pm.  Secondary Diagnosis:	Chronic systolic heart failure  Instructions for follow-up, activity and diet:	Take medications as instructed on discharge  Follow up with your PCP within 1 week   Follow up with your Cardiologist within 2 week   Weigh yourself daily if gained weight more than 2 lbs within 3 days call your cardiologist right away.  Low sodium diet  Secondary Diagnosis:	Stage 3 chronic kidney disease  Instructions for follow-up, activity and diet:	Take medications as instructed on discharge  Follow up with your PCP within 1 week   Follow up with Nephrologist within 2-3 weeks  Secondary Diagnosis:	CVA (cerebral vascular accident)  Instructions for follow-up, activity and diet:	Take medications as instructed on discharge  Follow up with your PCP within 1 week  Secondary Diagnosis:	GERD (gastroesophageal reflux disease)  Instructions for follow-up, activity and diet:	Take medications as instructed on discharge  Follow up with your PCP within 1 week Principal Discharge DX:	Chest pain  Goal:	resolution of symptoms  Instructions for follow-up, activity and diet:	Take medications as instructed on discharge  Follow up with your PCP within 1 week   Follow up with your Cardiologist within 2 weeks  Secondary Diagnosis:	Depression  Instructions for follow-up, activity and diet:	Take medications as instructed on discharge  Follow up with your PCP within 1 week  Secondary Diagnosis:	Atrial fibrillation  Instructions for follow-up, activity and diet:	Take medications as instructed on discharge  Follow up with your PCP (Dr. Rosa Elena Eubanks) this Friday 8/11/17 at 10am for Follow UP AND INR CHECK.   -Address: [address] [place] [address]  Follow-up with your cardiologist, Dr. Rodriguez [phone]. You have an appointment scheduled tomorrow, 8/10/17 at 1:45pm.  Secondary Diagnosis:	Chronic systolic heart failure  Instructions for follow-up, activity and diet:	Take medications as instructed on discharge  Follow up with your PCP within 1 week   Follow up with your Cardiologist within 2 week   Weigh yourself daily if gained weight more than 2 lbs within 3 days call your cardiologist right away.  Low sodium diet  Secondary Diagnosis:	Stage 3 chronic kidney disease  Instructions for follow-up, activity and diet:	Take medications as instructed on discharge  Follow up with your PCP within 1 week   Follow up with Nephrologist within 2-3 weeks  Secondary Diagnosis:	CVA (cerebral vascular accident)  Instructions for follow-up, activity and diet:	Take medications as instructed on discharge  Follow up with your PCP within 1 week  Secondary Diagnosis:	GERD (gastroesophageal reflux disease)  Instructions for follow-up, activity and diet:	Take medications as instructed on discharge  Follow up with your PCP within 1 week Principal Discharge DX:	Chest pain  Goal:	resolution of symptoms  Instructions for follow-up, activity and diet:	Take medications as instructed on discharge  Follow up with your PCP within 1 week   Follow up with your Cardiologist within 2 weeks  Secondary Diagnosis:	Depression  Instructions for follow-up, activity and diet:	Take medications as instructed on discharge  Follow up with your PCP within 1 week  Secondary Diagnosis:	Atrial fibrillation  Instructions for follow-up, activity and diet:	Take medications as instructed on discharge  Follow up with your PCP (Dr. Rosa Elena Eubanks) this Friday 8/11/17 at 10am for Follow UP AND INR CHECK.   -Address: [address] [place] [address]  Follow-up with your cardiologist, Dr. Rodriguez [phone]. You have an appointment scheduled tomorrow, 8/10/17 at 1:45pm.  Secondary Diagnosis:	Chronic systolic heart failure  Instructions for follow-up, activity and diet:	Take medications as instructed on discharge  Follow up with your PCP within 1 week   Follow up with your Cardiologist within 2 week   Weigh yourself daily if gained weight more than 2 lbs within 3 days call your cardiologist right away.  Low sodium diet  Secondary Diagnosis:	Stage 3 chronic kidney disease  Instructions for follow-up, activity and diet:	Take medications as instructed on discharge  Follow up with your PCP within 1 week   Follow up with Nephrologist within 2-3 weeks  Secondary Diagnosis:	CVA (cerebral vascular accident)  Instructions for follow-up, activity and diet:	Take medications as instructed on discharge  Follow up with your PCP within 1 week  Secondary Diagnosis:	GERD (gastroesophageal reflux disease)  Instructions for follow-up, activity and diet:	Take medications as instructed on discharge  Follow up with your PCP within 1 week

## 2017-08-09 NOTE — PROGRESS NOTE ADULT - PROBLEM SELECTOR PLAN 2
resolved. TTE done on 8/6 with findings unchanged compared to prior, pt to follow-up with outpatient cardiologist. Reviewed telemetry - no significant events. AICD interrogated by EP with no arrhythmias or shocks noted.  - no further inpatient w/u needed at this time

## 2017-08-09 NOTE — DISCHARGE NOTE ADULT - PROVIDER TOKENS
FREE:[LAST:[Cha],FIRST:[Rosa Elena (Cardiologist)],PHONE:[(   )    -],FAX:[(   )    -],ADDRESS:[[address] [place] [address], [place], [place].]],TOKEN:'[id number]' TOKEN:'[id number]',FREE:[LAST:[Cha],FIRST:[Rosa Elena (Cardiologist)],PHONE:[(   )    -],FAX:[(   )    -],ADDRESS:[[address] [place] [address].]],TOKEN:'[id number]'

## 2017-08-09 NOTE — PROGRESS NOTE ADULT - SUBJECTIVE AND OBJECTIVE BOX
NEPHROLOGY-[facility] [phone]        Patient seen and examined in bed.  He felt well        MEDICATIONS  (STANDING):  isosorbide   mononitrate ER Tablet (IMDUR) 30 milliGRAM(s) Oral daily  aspirin  chewable 81 milliGRAM(s) Oral daily  predniSONE   Tablet 10 milliGRAM(s) Oral daily  pantoprazole    Tablet 40 milliGRAM(s) Oral before breakfast  mirtazapine 45 milliGRAM(s) Oral at bedtime  atorvastatin 20 milliGRAM(s) Oral at bedtime  latanoprost 0.005% Ophthalmic Solution 1 Drop(s) Both EYES at bedtime  furosemide    Tablet 40 milliGRAM(s) Oral daily  potassium chloride    Tablet ER 20 milliEquivalent(s) Oral daily  carvedilol 6.25 milliGRAM(s) Oral every 12 hours      VITAL:  T(C): , Max: 37 (08-08-17 @ 21:43)  T(F): , Max: 98.6 (08-08-17 @ 21:43)  HR: 73 (08-09-17 @ 05:50)  BP: 107/76 (08-09-17 @ 05:50)  BP(mean): --  RR: 18 (08-09-17 @ 05:50)  SpO2: 100% (08-09-17 @ 05:50)  Wt(kg): --    I and O's:    08-08 @ 07:01  -  08-09 @ 07:00  --------------------------------------------------------  IN: 640 mL / OUT: 0 mL / NET: 640 mL    08-09 @ 07:01  -  08-09 @ 11:59  --------------------------------------------------------  IN: 180 mL / OUT: 0 mL / NET: 180 mL          PHYSICAL EXAM:    Constitutional: NAD  HEENT: PERRLA    Neck:  +  JVD  Respiratory: CTAB/L  Cardiovascular: S1 and S2  Gastrointestinal: BS+, soft, NT/ND  Extremities: No peripheral edema  Neurological: A/O x 3, no focal deficits  Psychiatric: Normal mood, normal affect  : No [place]  Skin: No rashes  Access: Not applicable    LABS:                        11.5   10.32 )-----------( 251      ( 09 Aug 2017 06:45 )             34.9     08-09    142  |  105  |  24<H>  ----------------------------<  111<H>  4.2   |  23  |  1.20    Ca    9.3      09 Aug 2017 06:30  Mg     2.0     08-08            Urine Studies:          RADIOLOGY & ADDITIONAL STUDIES:

## 2017-08-09 NOTE — PROGRESS NOTE ADULT - SUBJECTIVE AND OBJECTIVE BOX
CC: Chest pain    SUBJECTIVE / OVERNIGHT EVENTS:  No events overnight. Patient seen and evaluated at bedside this morning    MEDICATIONS  (STANDING):  isosorbide   mononitrate ER Tablet (IMDUR) 30 milliGRAM(s) Oral daily  aspirin  chewable 81 milliGRAM(s) Oral daily  predniSONE   Tablet 10 milliGRAM(s) Oral daily  pantoprazole    Tablet 40 milliGRAM(s) Oral before breakfast  mirtazapine 45 milliGRAM(s) Oral at bedtime  atorvastatin 20 milliGRAM(s) Oral at bedtime  latanoprost 0.005% Ophthalmic Solution 1 Drop(s) Both EYES at bedtime  furosemide    Tablet 40 milliGRAM(s) Oral daily  potassium chloride    Tablet ER 20 milliEquivalent(s) Oral daily  carvedilol 6.25 milliGRAM(s) Oral every 12 hours    MEDICATIONS  (PRN):  acetaminophen   Tablet. 650 milliGRAM(s) Oral every 6 hours PRN mild, moderate pain      Vital Signs Last 24 Hrs  T(C): 37 (09 Aug 2017 05:50), Max: 37 (08 Aug 2017 21:43)  T(F): 98.6 (09 Aug 2017 05:50), Max: 98.6 (08 Aug 2017 21:43)  HR: 73 (09 Aug 2017 05:50) (65 - 105)  BP: 107/76 (09 Aug 2017 05:50) (86/54 - 113/81)  BP(mean): --  RR: 18 (09 Aug 2017 05:50) (18 - 18)  SpO2: 100% (09 Aug 2017 05:50) (100% - 100%)  CAPILLARY BLOOD GLUCOSE        I&O's Summary    08 Aug 2017 07:01  -  09 Aug 2017 07:00  --------------------------------------------------------  IN: 640 mL / OUT: 0 mL / NET: 640 mL        PHYSICAL EXAM  GENERAL: Pleasant elderly man in NAD  EYES: EOMI, conjunctiva and sclera clear  NECK: Supple  CHEST/LUNG: Clear to auscultation bilaterally; No wheeze  HEART: mildly tachycardic, regular rhythm; +systolic murmur  ABDOMEN: Soft, Nontender, Nondistended; Bowel sounds present  EXTREMITIES:  2+ Peripheral Pulses, No clubbing, cyanosis. Mild pitting edema along R ankle and foot. Contracted RUE in flexed position.  NEURO: alert. Expressive aphasia. R facial droop    LABS:                        11.5   10.32 )-----------( 251      ( 09 Aug 2017 06:45 )             34.9     08-09    142  |  105  |  24<H>  ----------------------------<  111<H>  4.2   |  23  |  1.20    Ca    9.3      09 Aug 2017 06:30  Mg     2.0     08-08      PT/INR - ( 09 Aug 2017 06:45 )   PT: 27.8 SEC;   INR: 2.44                    RADIOLOGY & ADDITIONAL TESTS:    Imaging Personally Reviewed:  Consultant(s) Notes Reviewed:    Care Discussed with Consultants/Other Providers: CC: Chest pain    SUBJECTIVE / OVERNIGHT EVENTS:  No events overnight. Patient seen and evaluated at bedside this morning. Patient feels well. No complaints    MEDICATIONS  (STANDING):  isosorbide   mononitrate ER Tablet (IMDUR) 30 milliGRAM(s) Oral daily  aspirin  chewable 81 milliGRAM(s) Oral daily  predniSONE   Tablet 10 milliGRAM(s) Oral daily  pantoprazole    Tablet 40 milliGRAM(s) Oral before breakfast  mirtazapine 45 milliGRAM(s) Oral at bedtime  atorvastatin 20 milliGRAM(s) Oral at bedtime  latanoprost 0.005% Ophthalmic Solution 1 Drop(s) Both EYES at bedtime  furosemide    Tablet 40 milliGRAM(s) Oral daily  potassium chloride    Tablet ER 20 milliEquivalent(s) Oral daily  carvedilol 6.25 milliGRAM(s) Oral every 12 hours    MEDICATIONS  (PRN):  acetaminophen   Tablet. 650 milliGRAM(s) Oral every 6 hours PRN mild, moderate pain      Vital Signs Last 24 Hrs  T(C): 37 (09 Aug 2017 05:50), Max: 37 (08 Aug 2017 21:43)  T(F): 98.6 (09 Aug 2017 05:50), Max: 98.6 (08 Aug 2017 21:43)  HR: 73 (09 Aug 2017 05:50) (65 - 105)  BP: 107/76 (09 Aug 2017 05:50) (86/54 - 113/81)  BP(mean): --  RR: 18 (09 Aug 2017 05:50) (18 - 18)  SpO2: 100% (09 Aug 2017 05:50) (100% - 100%)  CAPILLARY BLOOD GLUCOSE        I&O's Summary    08 Aug 2017 07:01  -  09 Aug 2017 07:00  --------------------------------------------------------  IN: 640 mL / OUT: 0 mL / NET: 640 mL        PHYSICAL EXAM  GENERAL: Pleasant elderly man in NAD  EYES: EOMI, conjunctiva and sclera clear  NECK: Supple  CHEST/LUNG: Clear to auscultation bilaterally; No wheeze  HEART: mildly tachycardic, regular rhythm; +systolic murmur  ABDOMEN: Soft, Nontender, Nondistended; Bowel sounds present  EXTREMITIES:  2+ Peripheral Pulses, No clubbing, cyanosis. No LE edema noted. Contracted RUE in flexed position.  NEURO: alert. Expressive aphasia. R facial droop    LABS:                        11.5   10.32 )-----------( 251      ( 09 Aug 2017 06:45 )             34.9     08-09    142  |  105  |  24<H>  ----------------------------<  111<H>  4.2   |  23  |  1.20    Ca    9.3      09 Aug 2017 06:30  Mg     2.0     08-08      PT/INR - ( 09 Aug 2017 06:45 )   PT: 27.8 SEC;   INR: 2.44                    RADIOLOGY & ADDITIONAL TESTS:    Imaging Personally Reviewed:  Consultant(s) Notes Reviewed:    Care Discussed with Consultants/Other Providers:

## 2017-08-09 NOTE — DISCHARGE NOTE ADULT - CARE PROVIDERS DIRECT ADDRESSES
,DirectAddress_Unknown,DirectAddress_Unknown ,DirectAddress_Unknown,DirectAddress_Unknown,[email or web address]

## 2017-08-09 NOTE — PROGRESS NOTE ADULT - PROBLEM SELECTOR PLAN 3
Patient with ischemic cardiomyopathy s/p AICD placement. Correction from yesterday's note - lisinopril was held in the setting of BRYCE.  - continue home regimen of isosorbide mononitrate  - carvedilol 6.25mg BID  - continue atorvastatin  - given rise in creatinine with Lasix (Cr 0.99 -->1.2). Will HOLD lisinopril for another day to trend renal function.

## 2017-08-10 VITALS
HEART RATE: 80 BPM | OXYGEN SATURATION: 98 % | TEMPERATURE: 98 F | RESPIRATION RATE: 18 BRPM | SYSTOLIC BLOOD PRESSURE: 133 MMHG | DIASTOLIC BLOOD PRESSURE: 78 MMHG

## 2017-08-10 LAB
BUN SERPL-MCNC: 28 MG/DL — HIGH (ref 7–23)
CALCIUM SERPL-MCNC: 9.1 MG/DL — SIGNIFICANT CHANGE UP (ref 8.4–10.5)
CHLORIDE SERPL-SCNC: 103 MMOL/L — SIGNIFICANT CHANGE UP (ref 98–107)
CO2 SERPL-SCNC: 22 MMOL/L — SIGNIFICANT CHANGE UP (ref 22–31)
CREAT SERPL-MCNC: 1.28 MG/DL — SIGNIFICANT CHANGE UP (ref 0.5–1.3)
GLUCOSE SERPL-MCNC: 129 MG/DL — HIGH (ref 70–99)
HCT VFR BLD CALC: 35.8 % — LOW (ref 39–50)
HGB BLD-MCNC: 11.5 G/DL — LOW (ref 13–17)
INR BLD: 1.7 — HIGH (ref 0.88–1.17)
MCHC RBC-ENTMCNC: 32.1 % — SIGNIFICANT CHANGE UP (ref 32–36)
MCHC RBC-ENTMCNC: 34.1 PG — HIGH (ref 27–34)
MCV RBC AUTO: 106.2 FL — HIGH (ref 80–100)
NRBC # FLD: 0.05 — SIGNIFICANT CHANGE UP
PLATELET # BLD AUTO: 259 K/UL — SIGNIFICANT CHANGE UP (ref 150–400)
PMV BLD: 10.6 FL — SIGNIFICANT CHANGE UP (ref 7–13)
POTASSIUM SERPL-MCNC: 3.9 MMOL/L — SIGNIFICANT CHANGE UP (ref 3.5–5.3)
POTASSIUM SERPL-SCNC: 3.9 MMOL/L — SIGNIFICANT CHANGE UP (ref 3.5–5.3)
PROTHROM AB SERPL-ACNC: 19.2 SEC — HIGH (ref 9.8–13.1)
RBC # BLD: 3.37 M/UL — LOW (ref 4.2–5.8)
RBC # FLD: 17.2 % — HIGH (ref 10.3–14.5)
SODIUM SERPL-SCNC: 138 MMOL/L — SIGNIFICANT CHANGE UP (ref 135–145)
WBC # BLD: 10.11 K/UL — SIGNIFICANT CHANGE UP (ref 3.8–10.5)
WBC # FLD AUTO: 10.11 K/UL — SIGNIFICANT CHANGE UP (ref 3.8–10.5)

## 2017-08-10 RX ORDER — AMIODARONE HYDROCHLORIDE 400 MG/1
1 TABLET ORAL
Qty: 0 | Refills: 0 | COMMUNITY

## 2017-08-10 RX ADMIN — AMIODARONE HYDROCHLORIDE 400 MILLIGRAM(S): 400 TABLET ORAL at 05:57

## 2017-08-10 RX ADMIN — PANTOPRAZOLE SODIUM 40 MILLIGRAM(S): 20 TABLET, DELAYED RELEASE ORAL at 05:57

## 2017-08-10 RX ADMIN — CARVEDILOL PHOSPHATE 6.25 MILLIGRAM(S): 80 CAPSULE, EXTENDED RELEASE ORAL at 05:56

## 2017-08-10 RX ADMIN — Medication 81 MILLIGRAM(S): at 13:05

## 2017-08-10 RX ADMIN — Medication 10 MILLIGRAM(S): at 05:56

## 2017-08-10 RX ADMIN — ISOSORBIDE MONONITRATE 30 MILLIGRAM(S): 60 TABLET, EXTENDED RELEASE ORAL at 13:05

## 2017-08-10 RX ADMIN — Medication 40 MILLIGRAM(S): at 05:57

## 2017-08-10 RX ADMIN — Medication 20 MILLIEQUIVALENT(S): at 13:05

## 2017-08-10 NOTE — PROGRESS NOTE ADULT - SUBJECTIVE AND OBJECTIVE BOX
CC: Chest pain    SUBJECTIVE / OVERNIGHT EVENTS:  Patient not discharged yesterday because patient's wife was unable to pick him up. No events overnight. Patient has no complaints. Feels well.    MEDICATIONS  (STANDING):  isosorbide   mononitrate ER Tablet (IMDUR) 30 milliGRAM(s) Oral daily  aspirin  chewable 81 milliGRAM(s) Oral daily  predniSONE   Tablet 10 milliGRAM(s) Oral daily  pantoprazole    Tablet 40 milliGRAM(s) Oral before breakfast  mirtazapine 45 milliGRAM(s) Oral at bedtime  atorvastatin 20 milliGRAM(s) Oral at bedtime  latanoprost 0.005% Ophthalmic Solution 1 Drop(s) Both EYES at bedtime  furosemide    Tablet 40 milliGRAM(s) Oral daily  potassium chloride    Tablet ER 20 milliEquivalent(s) Oral daily  carvedilol 6.25 milliGRAM(s) Oral every 12 hours    MEDICATIONS  (PRN):  acetaminophen   Tablet. 650 milliGRAM(s) Oral every 6 hours PRN mild, moderate pain      Vital Signs Last 24 Hrs  T(C): 36.8 (10 Aug 2017 05:54), Max: 36.8 (09 Aug 2017 21:37)  T(F): 98.3 (10 Aug 2017 05:54), Max: 98.3 (09 Aug 2017 21:37)  HR: 86 (10 Aug 2017 05:54) (63 - 86)  BP: 135/86 (10 Aug 2017 05:54) (93/62 - 135/86)  BP(mean): --  RR: 17 (10 Aug 2017 05:54) (17 - 18)  SpO2: 100% (10 Aug 2017 05:54) (98% - 100%)  CAPILLARY BLOOD GLUCOSE        I&O's Summary    09 Aug 2017 07:01  -  10 Aug 2017 07:00  --------------------------------------------------------  IN: 480 mL / OUT: 0 mL / NET: 480 mL    10 Aug 2017 07:01  -  10 Aug 2017 12:41  --------------------------------------------------------  IN: 200 mL / OUT: 0 mL / NET: 200 mL          PHYSICAL EXAM  GENERAL: Pleasant elderly man in NAD. Ambulatory with assistance  EYES: EOMI, conjunctiva and sclera clear  CHEST/LUNG: Clear to auscultation bilaterally; No wheeze  HEART: mildly tachycardic, regular rhythm; +systolic murmur  ABDOMEN: Soft, Nontender, Nondistended; Bowel sounds present  EXTREMITIES:  2+ Peripheral Pulses, No clubbing, cyanosis. No LE edema noted. Contracted RUE in flexed position.  NEURO: alert. Expressive aphasia. R facial droop      LABS:                        11.5   10.11 )-----------( 259      ( 10 Aug 2017 07:30 )             35.8     08-10    138  |  103  |  28<H>  ----------------------------<  129<H>  3.9   |  22  |  1.28    Ca    9.1      10 Aug 2017 06:45      PT/INR - ( 10 Aug 2017 07:30 )   PT: 19.2 SEC;   INR: 1.70

## 2017-08-10 NOTE — PROGRESS NOTE ADULT - PROBLEM SELECTOR PLAN 8
- on chronic AC therapy    DISPO: Home with services and home PT. HHA and wife present at bedside. Med rec and discharge summary complete. Case discuss with CM and SW. Time spent in discharge planning and coordinating outpatient care: 45 minutes.

## 2017-08-10 NOTE — PROGRESS NOTE ADULT - ASSESSMENT
71 y/o male, with a PmHx of CHF with an EF of 20%, AICD, Prostate Ca s/p seed implants, Afib on ac, and CVA with residual right hemiparesis admitted for evaluation of chest pain. Cardiac w/u negative for ACS and patient w/o subsequent episodes of chest pain.

## 2017-08-10 NOTE — PROGRESS NOTE ADULT - PROBLEM SELECTOR PLAN 3
Patient with ischemic cardiomyopathy s/p AICD placement. Correction from yesterday's note - lisinopril was held in the setting of BRYCE.  - continue home regimen of isosorbide mononitrate  - carvedilol 6.25mg BID  - continue atorvastatin  - HOLD lisinopril. To be resumed as an outpatient at discretion of his PCP and/or cardiologist.

## 2017-08-10 NOTE — PROGRESS NOTE ADULT - PROBLEM SELECTOR PLAN 1
Had an extensive face-to-face conversation (20 minutes duration) with patient's wife and patient regarding his medication reconciliation. She clarified that she and the patient were instructed by his cardiologist, Dr. Rodriguez, to be on a regimen of amiodarone 400mg BID, not daily as previously noted. He was also previously instructed to be on carvedilol 6.25mg BID.  Unfortunately, patient did not receive dose of warfarin overnight since he was initially thought to be discharged home. Instructed patient and his family to restart lower dose of warfarin tonight.  - Continue carvedilol 6.25mg q12h  - pt to resume warfarin tonight at 2.5mg  - INR to be f/u by his outpatient PCP, has appt scheduled for tomorrow  - HR goal <110  - pt will need to reschedule appt with his cardiologist, Dr. Rodriguez, since he is unable to attend appt scheduled for this afternoon

## 2017-08-10 NOTE — PROGRESS NOTE ADULT - PROBLEM SELECTOR PLAN 4
Being followed by nephrology, appreciate recs  - renally dose medications  - Lasix 40mg daily, potassium chloride daily as per nephrology  - will like d/c KCl when patient is restarted on ACEi.

## 2017-08-10 NOTE — PROGRESS NOTE ADULT - PROBLEM SELECTOR PROBLEM 8
Need for prophylactic measure

## 2017-08-31 ENCOUNTER — APPOINTMENT (OUTPATIENT)
Dept: CARDIOLOGY | Facility: CLINIC | Age: 72
End: 2017-08-31
Payer: MEDICARE

## 2017-08-31 VITALS — BODY MASS INDEX: 20.73 KG/M2 | HEART RATE: 61 BPM | HEIGHT: 66 IN | WEIGHT: 129 LBS

## 2017-08-31 VITALS — DIASTOLIC BLOOD PRESSURE: 60 MMHG | SYSTOLIC BLOOD PRESSURE: 96 MMHG

## 2017-08-31 DIAGNOSIS — I10 ESSENTIAL (PRIMARY) HYPERTENSION: ICD-10-CM

## 2017-08-31 DIAGNOSIS — R60.0 LOCALIZED EDEMA: ICD-10-CM

## 2017-08-31 DIAGNOSIS — I47.1 SUPRAVENTRICULAR TACHYCARDIA: ICD-10-CM

## 2017-08-31 DIAGNOSIS — Z95.810 PRESENCE OF AUTOMATIC (IMPLANTABLE) CARDIAC DEFIBRILLATOR: ICD-10-CM

## 2017-08-31 DIAGNOSIS — R09.89 OTHER SPECIFIED SYMPTOMS AND SIGNS INVOLVING THE CIRCULATORY AND RESPIRATORY SYSTEMS: ICD-10-CM

## 2017-08-31 DIAGNOSIS — I63.9 CEREBRAL INFARCTION, UNSPECIFIED: ICD-10-CM

## 2017-08-31 DIAGNOSIS — I48.0 PAROXYSMAL ATRIAL FIBRILLATION: ICD-10-CM

## 2017-08-31 DIAGNOSIS — C61 MALIGNANT NEOPLASM OF PROSTATE: ICD-10-CM

## 2017-08-31 DIAGNOSIS — N40.0 BENIGN PROSTATIC HYPERPLASIA WITHOUT LOWER URINARY TRACT SYMPMS: ICD-10-CM

## 2017-08-31 DIAGNOSIS — I95.9 HYPOTENSION, UNSPECIFIED: ICD-10-CM

## 2017-08-31 PROCEDURE — 99214 OFFICE O/P EST MOD 30 MIN: CPT

## 2017-08-31 RX ORDER — ISOSORBIDE MONONITRATE 30 MG/1
30 TABLET, EXTENDED RELEASE ORAL DAILY
Refills: 0 | Status: ACTIVE | COMMUNITY

## 2017-08-31 RX ORDER — AMIODARONE HYDROCHLORIDE 200 MG/1
200 TABLET ORAL DAILY
Qty: 90 | Refills: 1 | Status: ACTIVE | COMMUNITY
Start: 2017-08-31 | End: 1900-01-01

## 2017-09-01 RX ORDER — CARVEDILOL 6.25 MG/1
6.25 TABLET, FILM COATED ORAL TWICE DAILY
Qty: 180 | Refills: 0 | Status: ACTIVE | COMMUNITY
Start: 2017-09-01

## 2017-09-01 RX ORDER — LISINOPRIL 10 MG/1
10 TABLET ORAL DAILY
Qty: 90 | Refills: 1 | Status: ACTIVE | COMMUNITY
Start: 2017-09-01 | End: 1900-01-01

## 2017-09-01 RX ORDER — ISOSORBIDE MONONITRATE 30 MG/1
30 TABLET, EXTENDED RELEASE ORAL DAILY
Qty: 90 | Refills: 0 | Status: ACTIVE | COMMUNITY
Start: 2017-09-01

## 2017-09-29 ENCOUNTER — APPOINTMENT (OUTPATIENT)
Dept: ELECTROPHYSIOLOGY | Facility: CLINIC | Age: 72
End: 2017-09-29

## 2017-10-26 ENCOUNTER — APPOINTMENT (OUTPATIENT)
Dept: CARDIOLOGY | Facility: CLINIC | Age: 72
End: 2017-10-26

## 2017-11-08 ENCOUNTER — APPOINTMENT (OUTPATIENT)
Dept: ELECTROPHYSIOLOGY | Facility: CLINIC | Age: 72
End: 2017-11-08
Payer: MEDICARE

## 2017-11-08 VITALS — SYSTOLIC BLOOD PRESSURE: 94 MMHG | DIASTOLIC BLOOD PRESSURE: 62 MMHG | HEART RATE: 66 BPM

## 2017-11-08 DIAGNOSIS — I50.42 CHRONIC COMBINED SYSTOLIC (CONGESTIVE) AND DIASTOLIC (CONGESTIVE) HEART FAILURE: ICD-10-CM

## 2017-11-08 PROCEDURE — 93284 PRGRMG EVAL IMPLANTABLE DFB: CPT

## 2018-02-12 ENCOUNTER — APPOINTMENT (OUTPATIENT)
Dept: ELECTROPHYSIOLOGY | Facility: CLINIC | Age: 73
End: 2018-02-12
Payer: MEDICARE

## 2018-02-12 DIAGNOSIS — I48.92 UNSPECIFIED ATRIAL FLUTTER: ICD-10-CM

## 2018-02-12 PROCEDURE — 93295 DEV INTERROG REMOTE 1/2/MLT: CPT

## 2018-02-12 PROCEDURE — 93296 REM INTERROG EVL PM/IDS: CPT

## 2018-03-06 NOTE — PROGRESS NOTE ADULT - PROBLEM SELECTOR PROBLEM 4
Chronic systolic heart failure Complex Repair And V-Y Plasty Text: The defect edges were debeveled with a #15 scalpel blade.  The primary defect was closed partially with a complex linear closure.  Given the location of the remaining defect, shape of the defect and the proximity to free margins a V-Y plasty was deemed most appropriate for complete closure of the defect.  Using a sterile surgical marker, an appropriate advancement flap was drawn incorporating the defect and placing the expected incisions within the relaxed skin tension lines where possible.    The area thus outlined was incised deep to adipose tissue with a #15 scalpel blade.  The skin margins were undermined to an appropriate distance in all directions utilizing iris scissors.

## 2018-03-14 ENCOUNTER — INPATIENT (INPATIENT)
Facility: HOSPITAL | Age: 73
LOS: 5 days | Discharge: NURSING, REHAB OR LONG-TERM CARE | End: 2018-03-20
Attending: HOSPITALIST | Admitting: HOSPITALIST
Payer: MEDICARE

## 2018-03-14 VITALS
DIASTOLIC BLOOD PRESSURE: 56 MMHG | SYSTOLIC BLOOD PRESSURE: 95 MMHG | OXYGEN SATURATION: 100 % | HEART RATE: 53 BPM | TEMPERATURE: 97 F | RESPIRATION RATE: 14 BRPM

## 2018-03-14 DIAGNOSIS — Z96.649 PRESENCE OF UNSPECIFIED ARTIFICIAL HIP JOINT: Chronic | ICD-10-CM

## 2018-03-14 LAB
BASOPHILS # BLD AUTO: 0.04 K/UL — SIGNIFICANT CHANGE UP (ref 0–0.2)
BASOPHILS NFR BLD AUTO: 0.5 % — SIGNIFICANT CHANGE UP (ref 0–2)
EOSINOPHIL # BLD AUTO: 0.05 K/UL — SIGNIFICANT CHANGE UP (ref 0–0.5)
EOSINOPHIL NFR BLD AUTO: 0.6 % — SIGNIFICANT CHANGE UP (ref 0–6)
HCT VFR BLD CALC: 38.7 % — LOW (ref 39–50)
HGB BLD-MCNC: 12.4 G/DL — LOW (ref 13–17)
IMM GRANULOCYTES # BLD AUTO: 0.07 # — SIGNIFICANT CHANGE UP
IMM GRANULOCYTES NFR BLD AUTO: 0.9 % — SIGNIFICANT CHANGE UP (ref 0–1.5)
LYMPHOCYTES # BLD AUTO: 1.45 K/UL — SIGNIFICANT CHANGE UP (ref 1–3.3)
LYMPHOCYTES # BLD AUTO: 17.9 % — SIGNIFICANT CHANGE UP (ref 13–44)
MCHC RBC-ENTMCNC: 32 % — SIGNIFICANT CHANGE UP (ref 32–36)
MCHC RBC-ENTMCNC: 33.2 PG — SIGNIFICANT CHANGE UP (ref 27–34)
MCV RBC AUTO: 103.5 FL — HIGH (ref 80–100)
MONOCYTES # BLD AUTO: 0.68 K/UL — SIGNIFICANT CHANGE UP (ref 0–0.9)
MONOCYTES NFR BLD AUTO: 8.4 % — SIGNIFICANT CHANGE UP (ref 2–14)
NEUTROPHILS # BLD AUTO: 5.83 K/UL — SIGNIFICANT CHANGE UP (ref 1.8–7.4)
NEUTROPHILS NFR BLD AUTO: 71.7 % — SIGNIFICANT CHANGE UP (ref 43–77)
NRBC # FLD: 0.05 — SIGNIFICANT CHANGE UP
PLATELET # BLD AUTO: 196 K/UL — SIGNIFICANT CHANGE UP (ref 150–400)
PMV BLD: 10.7 FL — SIGNIFICANT CHANGE UP (ref 7–13)
RBC # BLD: 3.74 M/UL — LOW (ref 4.2–5.8)
RBC # FLD: 17.2 % — HIGH (ref 10.3–14.5)
WBC # BLD: 8.12 K/UL — SIGNIFICANT CHANGE UP (ref 3.8–10.5)
WBC # FLD AUTO: 8.12 K/UL — SIGNIFICANT CHANGE UP (ref 3.8–10.5)

## 2018-03-14 NOTE — ED ADULT NURSE NOTE - CHIEF COMPLAINT QUOTE
pt brought in by wife, as per wife pt has been expressing headache, chest pain and abdominal pain since today. hx of stroke, pt has baseline difficulty verbalizing needs and ambulating and now endorsing tingling and dizziness. as per family pt has been noticeably weaker since yesterday, no facial droop noted. md fagan called for stroke eval, no code stroke called.

## 2018-03-14 NOTE — ED PROVIDER NOTE - CARE PLAN
Principal Discharge DX:	UTI (urinary tract infection)  Secondary Diagnosis:	CHF exacerbation  Secondary Diagnosis:	Generalized weakness

## 2018-03-14 NOTE — ED PROVIDER NOTE - OBJECTIVE STATEMENT
71 yo man w/ extensive PMH outlined below here w/ fatigue, worsening ambulation, worsening LE swelling. History is from wife as pt is not answering questions. States over past few days has become increasingly fatigued, unable to walk because of respiratory status. No report of fever or cough.

## 2018-03-14 NOTE — ED PROVIDER NOTE - PROGRESS NOTE DETAILS
Nic:  called for stroke eval in triage.  72M h/o CVA with residual right weakness and dysarthria brought in by family for weakness and ?chest pain/abd pain since yesterday.  Also with ?numbness/tingling today.  Pt  unable to specify further history due to aphasia.  Exam with no new deficit findings.  No stroke code called, pt to go to main for further evaluation. Gera Van MD PGY4: Labs, imaging reviewed. Ceftriaxone given for uti. Fluids withheld given likely condominant chf exacerbation, lasix withheld given infection and elevated lactate. Troponin in line w/ prior. Case d/w Dr. Urias, accepted admission and requested signout to tele pa. Telemetry PA signout @ [id number].

## 2018-03-14 NOTE — ED ADULT NURSE NOTE - OBJECTIVE STATEMENT
pt arrives w/ wife at bedside who states pt hx of stroke and not able to speak anymore. states pt was complaining of headache, chest pain, sob, and swelling to legs. pt with pitting edema to lower extremities bilaterally but more prominent in right leg. pt with left chest wall AICD but denies it firing today. pt placed on cardiac monitor gisele rhythm noted resp even and unlabored.  pt w/ residual weakness to right side due to CVA in 2013. IV accessed 20 gauge Left arm labs sent, waiting further orders will make primary Rn Montserrat aware of status.

## 2018-03-14 NOTE — ED PROVIDER NOTE - MEDICAL DECISION MAKING DETAILS
73 yo man w/ fatigue, worsening LE edema. Concerning for CHF exacerbation. Labs, ekg, cxr. Likely admit.

## 2018-03-14 NOTE — ED ADULT TRIAGE NOTE - CHIEF COMPLAINT QUOTE
pt brought in by wife, as per wife pt has been expressing headache, chest pain and abdominal pain since today. hx of strok-, pt has difficulty verbalizing needs and ambulating and endorsing tingling and dizziness. as per family pt has been noticeably weaker since yesterday, no facial droop noted. md fagan called for stroke eval, no code stroke called. pt brought in by wife, as per wife pt has been expressing headache, chest pain and abdominal pain since today. hx of stroke, pt has baseline difficulty verbalizing needs and ambulating and now endorsing tingling and dizziness. as per family pt has been noticeably weaker since yesterday, no facial droop noted. md fagan called for stroke eval, no code stroke called.

## 2018-03-14 NOTE — ED PROVIDER NOTE - ATTENDING CONTRIBUTION TO CARE
Remedios: 73 yo male with CHF, AICD, Afib, CVA with residual aphasia brought in by his wife for generalized weakness and chest pain. NO SOB, abdominal pain, nausea or vomiting. + worsening LE edema over the last several days. No recent illness, fevers or chills. Exam: chronically ill appearing, NAD, + irregular and bradycardic rhythm, + decreased breath sounds at b/l bases. abdomen is soft an dnontender. 2+ LE edema b/l. A/P- 73 yo male with generalized weakness and malaise. will obtain cbc, cmp, cardiac enzymes, bnp, cxr, u/a and admit.

## 2018-03-15 ENCOUNTER — APPOINTMENT (OUTPATIENT)
Dept: CARDIOLOGY | Facility: CLINIC | Age: 73
End: 2018-03-15

## 2018-03-15 DIAGNOSIS — I63.9 CEREBRAL INFARCTION, UNSPECIFIED: ICD-10-CM

## 2018-03-15 DIAGNOSIS — Z29.9 ENCOUNTER FOR PROPHYLACTIC MEASURES, UNSPECIFIED: ICD-10-CM

## 2018-03-15 DIAGNOSIS — I50.9 HEART FAILURE, UNSPECIFIED: ICD-10-CM

## 2018-03-15 DIAGNOSIS — N17.9 ACUTE KIDNEY FAILURE, UNSPECIFIED: ICD-10-CM

## 2018-03-15 DIAGNOSIS — I48.91 UNSPECIFIED ATRIAL FIBRILLATION: ICD-10-CM

## 2018-03-15 DIAGNOSIS — I50.22 CHRONIC SYSTOLIC (CONGESTIVE) HEART FAILURE: ICD-10-CM

## 2018-03-15 DIAGNOSIS — I24.9 ACUTE ISCHEMIC HEART DISEASE, UNSPECIFIED: ICD-10-CM

## 2018-03-15 DIAGNOSIS — R20.2 PARESTHESIA OF SKIN: ICD-10-CM

## 2018-03-15 DIAGNOSIS — N39.0 URINARY TRACT INFECTION, SITE NOT SPECIFIED: ICD-10-CM

## 2018-03-15 DIAGNOSIS — I20.9 ANGINA PECTORIS, UNSPECIFIED: ICD-10-CM

## 2018-03-15 LAB
ALBUMIN SERPL ELPH-MCNC: 3.7 G/DL — SIGNIFICANT CHANGE UP (ref 3.3–5)
ALP SERPL-CCNC: 145 U/L — HIGH (ref 40–120)
ALT FLD-CCNC: 19 U/L — SIGNIFICANT CHANGE UP (ref 4–41)
APPEARANCE UR: SIGNIFICANT CHANGE UP
APTT BLD: 39.8 SEC — HIGH (ref 27.5–37.4)
AST SERPL-CCNC: 24 U/L — SIGNIFICANT CHANGE UP (ref 4–40)
BACTERIA # UR AUTO: SIGNIFICANT CHANGE UP
BASE EXCESS BLDV CALC-SCNC: 3.7 MMOL/L — SIGNIFICANT CHANGE UP
BASOPHILS # BLD AUTO: 0.04 K/UL — SIGNIFICANT CHANGE UP (ref 0–0.2)
BASOPHILS NFR BLD AUTO: 0.5 % — SIGNIFICANT CHANGE UP (ref 0–2)
BILIRUB SERPL-MCNC: 1 MG/DL — SIGNIFICANT CHANGE UP (ref 0.2–1.2)
BILIRUB UR-MCNC: NEGATIVE — SIGNIFICANT CHANGE UP
BLOOD GAS VENOUS - CREATININE: 1.46 MG/DL — HIGH (ref 0.5–1.3)
BLOOD UR QL VISUAL: HIGH
BUN SERPL-MCNC: 34 MG/DL — HIGH (ref 7–23)
BUN SERPL-MCNC: 36 MG/DL — HIGH (ref 7–23)
CALCIUM SERPL-MCNC: 8.8 MG/DL — SIGNIFICANT CHANGE UP (ref 8.4–10.5)
CALCIUM SERPL-MCNC: 9 MG/DL — SIGNIFICANT CHANGE UP (ref 8.4–10.5)
CHLORIDE BLDV-SCNC: 103 MMOL/L — SIGNIFICANT CHANGE UP (ref 96–108)
CHLORIDE SERPL-SCNC: 101 MMOL/L — SIGNIFICANT CHANGE UP (ref 98–107)
CHLORIDE SERPL-SCNC: 102 MMOL/L — SIGNIFICANT CHANGE UP (ref 98–107)
CHOLEST SERPL-MCNC: 162 MG/DL — SIGNIFICANT CHANGE UP (ref 120–199)
CK MB BLD-MCNC: 2.68 NG/ML — SIGNIFICANT CHANGE UP (ref 1–6.6)
CK MB BLD-MCNC: 2.79 NG/ML — SIGNIFICANT CHANGE UP (ref 1–6.6)
CK MB BLD-MCNC: SIGNIFICANT CHANGE UP (ref 0–2.5)
CK SERPL-CCNC: 107 U/L — SIGNIFICANT CHANGE UP (ref 30–200)
CK SERPL-CCNC: 96 U/L — SIGNIFICANT CHANGE UP (ref 30–200)
CO2 SERPL-SCNC: 24 MMOL/L — SIGNIFICANT CHANGE UP (ref 22–31)
CO2 SERPL-SCNC: 24 MMOL/L — SIGNIFICANT CHANGE UP (ref 22–31)
COLOR SPEC: YELLOW — SIGNIFICANT CHANGE UP
CREAT SERPL-MCNC: 1.48 MG/DL — HIGH (ref 0.5–1.3)
CREAT SERPL-MCNC: 1.55 MG/DL — HIGH (ref 0.5–1.3)
EOSINOPHIL # BLD AUTO: 0.04 K/UL — SIGNIFICANT CHANGE UP (ref 0–0.5)
EOSINOPHIL NFR BLD AUTO: 0.5 % — SIGNIFICANT CHANGE UP (ref 0–6)
GAS PNL BLDV: 135 MMOL/L — LOW (ref 136–146)
GLUCOSE BLDV-MCNC: 117 — HIGH (ref 70–99)
GLUCOSE SERPL-MCNC: 103 MG/DL — HIGH (ref 70–99)
GLUCOSE SERPL-MCNC: 119 MG/DL — HIGH (ref 70–99)
GLUCOSE UR-MCNC: NEGATIVE — SIGNIFICANT CHANGE UP
HCO3 BLDV-SCNC: 25 MMOL/L — SIGNIFICANT CHANGE UP (ref 20–27)
HCT VFR BLD CALC: 37.3 % — LOW (ref 39–50)
HCT VFR BLDV CALC: 39.4 % — SIGNIFICANT CHANGE UP (ref 39–51)
HDLC SERPL-MCNC: 34 MG/DL — LOW (ref 35–55)
HGB BLD-MCNC: 12 G/DL — LOW (ref 13–17)
HGB BLDV-MCNC: 12.8 G/DL — LOW (ref 13–17)
HYALINE CASTS # UR AUTO: SIGNIFICANT CHANGE UP (ref 0–?)
IMM GRANULOCYTES # BLD AUTO: 0.04 # — SIGNIFICANT CHANGE UP
IMM GRANULOCYTES NFR BLD AUTO: 0.5 % — SIGNIFICANT CHANGE UP (ref 0–1.5)
INR BLD: 1.27 — HIGH (ref 0.88–1.17)
KETONES UR-MCNC: NEGATIVE — SIGNIFICANT CHANGE UP
LACTATE BLDV-MCNC: 2.2 MMOL/L — HIGH (ref 0.5–2)
LACTATE SERPL-SCNC: 2.1 MMOL/L — HIGH (ref 0.5–2)
LEUKOCYTE ESTERASE UR-ACNC: HIGH
LIPID PNL WITH DIRECT LDL SERPL: 117 MG/DL — SIGNIFICANT CHANGE UP
LYMPHOCYTES # BLD AUTO: 2.34 K/UL — SIGNIFICANT CHANGE UP (ref 1–3.3)
LYMPHOCYTES # BLD AUTO: 27.2 % — SIGNIFICANT CHANGE UP (ref 13–44)
MAGNESIUM SERPL-MCNC: 2.5 MG/DL — SIGNIFICANT CHANGE UP (ref 1.6–2.6)
MCHC RBC-ENTMCNC: 32.2 % — SIGNIFICANT CHANGE UP (ref 32–36)
MCHC RBC-ENTMCNC: 33 PG — SIGNIFICANT CHANGE UP (ref 27–34)
MCV RBC AUTO: 102.5 FL — HIGH (ref 80–100)
MONOCYTES # BLD AUTO: 0.93 K/UL — HIGH (ref 0–0.9)
MONOCYTES NFR BLD AUTO: 10.8 % — SIGNIFICANT CHANGE UP (ref 2–14)
MUCOUS THREADS # UR AUTO: SIGNIFICANT CHANGE UP
NEUTROPHILS # BLD AUTO: 5.22 K/UL — SIGNIFICANT CHANGE UP (ref 1.8–7.4)
NEUTROPHILS NFR BLD AUTO: 60.5 % — SIGNIFICANT CHANGE UP (ref 43–77)
NITRITE UR-MCNC: NEGATIVE — SIGNIFICANT CHANGE UP
NRBC # FLD: 0.03 — SIGNIFICANT CHANGE UP
NT-PROBNP SERPL-SCNC: 7081 PG/ML — SIGNIFICANT CHANGE UP
PCO2 BLDV: 50 MMHG — SIGNIFICANT CHANGE UP (ref 41–51)
PH BLDV: 7.38 PH — SIGNIFICANT CHANGE UP (ref 7.32–7.43)
PH UR: 6 — SIGNIFICANT CHANGE UP (ref 4.6–8)
PHOSPHATE SERPL-MCNC: 3.7 MG/DL — SIGNIFICANT CHANGE UP (ref 2.5–4.5)
PLATELET # BLD AUTO: 207 K/UL — SIGNIFICANT CHANGE UP (ref 150–400)
PMV BLD: 10.9 FL — SIGNIFICANT CHANGE UP (ref 7–13)
PO2 BLDV: < 24 MMHG — LOW (ref 35–40)
POTASSIUM BLDV-SCNC: 4.6 MMOL/L — HIGH (ref 3.4–4.5)
POTASSIUM SERPL-MCNC: 4.7 MMOL/L — SIGNIFICANT CHANGE UP (ref 3.5–5.3)
POTASSIUM SERPL-MCNC: 4.9 MMOL/L — SIGNIFICANT CHANGE UP (ref 3.5–5.3)
POTASSIUM SERPL-SCNC: 4.7 MMOL/L — SIGNIFICANT CHANGE UP (ref 3.5–5.3)
POTASSIUM SERPL-SCNC: 4.9 MMOL/L — SIGNIFICANT CHANGE UP (ref 3.5–5.3)
PROT SERPL-MCNC: 7.2 G/DL — SIGNIFICANT CHANGE UP (ref 6–8.3)
PROT UR-MCNC: 100 MG/DL — SIGNIFICANT CHANGE UP
PROTHROM AB SERPL-ACNC: 14.2 SEC — HIGH (ref 9.8–13.1)
RBC # BLD: 3.64 M/UL — LOW (ref 4.2–5.8)
RBC # FLD: 17.1 % — HIGH (ref 10.3–14.5)
RBC CASTS # UR COMP ASSIST: SIGNIFICANT CHANGE UP (ref 0–?)
SAO2 % BLDV: 22.3 % — LOW (ref 60–85)
SODIUM SERPL-SCNC: 140 MMOL/L — SIGNIFICANT CHANGE UP (ref 135–145)
SODIUM SERPL-SCNC: 141 MMOL/L — SIGNIFICANT CHANGE UP (ref 135–145)
SP GR SPEC: 1.02 — SIGNIFICANT CHANGE UP (ref 1–1.04)
SQUAMOUS # UR AUTO: SIGNIFICANT CHANGE UP
TRIGL SERPL-MCNC: 81 MG/DL — SIGNIFICANT CHANGE UP (ref 10–149)
TROPONIN T SERPL-MCNC: 0.13 NG/ML — HIGH (ref 0–0.06)
TROPONIN T SERPL-MCNC: 0.15 NG/ML — HIGH (ref 0–0.06)
TSH SERPL-MCNC: 1.98 UIU/ML — SIGNIFICANT CHANGE UP (ref 0.27–4.2)
UROBILINOGEN FLD QL: 2 MG/DL — HIGH
WBC # BLD: 8.61 K/UL — SIGNIFICANT CHANGE UP (ref 3.8–10.5)
WBC # FLD AUTO: 8.61 K/UL — SIGNIFICANT CHANGE UP (ref 3.8–10.5)
WBC CLUMPS #/AREA URNS HPF: PRESENT — HIGH (ref 0–?)
WBC UR QL: >50 — HIGH (ref 0–?)

## 2018-03-15 PROCEDURE — 71045 X-RAY EXAM CHEST 1 VIEW: CPT | Mod: 26

## 2018-03-15 PROCEDURE — 99223 1ST HOSP IP/OBS HIGH 75: CPT

## 2018-03-15 RX ORDER — LISINOPRIL 2.5 MG/1
10 TABLET ORAL DAILY
Qty: 0 | Refills: 0 | Status: DISCONTINUED | OUTPATIENT
Start: 2018-03-15 | End: 2018-03-20

## 2018-03-15 RX ORDER — LATANOPROST 0.05 MG/ML
1 SOLUTION/ DROPS OPHTHALMIC; TOPICAL AT BEDTIME
Qty: 0 | Refills: 0 | Status: DISCONTINUED | OUTPATIENT
Start: 2018-03-15 | End: 2018-03-20

## 2018-03-15 RX ORDER — CARVEDILOL PHOSPHATE 80 MG/1
6.25 CAPSULE, EXTENDED RELEASE ORAL EVERY 12 HOURS
Qty: 0 | Refills: 0 | Status: DISCONTINUED | OUTPATIENT
Start: 2018-03-15 | End: 2018-03-20

## 2018-03-15 RX ORDER — CEFTRIAXONE 500 MG/1
1 INJECTION, POWDER, FOR SOLUTION INTRAMUSCULAR; INTRAVENOUS EVERY 24 HOURS
Qty: 0 | Refills: 0 | Status: DISCONTINUED | OUTPATIENT
Start: 2018-03-16 | End: 2018-03-18

## 2018-03-15 RX ORDER — ATORVASTATIN CALCIUM 80 MG/1
20 TABLET, FILM COATED ORAL AT BEDTIME
Qty: 0 | Refills: 0 | Status: DISCONTINUED | OUTPATIENT
Start: 2018-03-15 | End: 2018-03-20

## 2018-03-15 RX ORDER — CEFTRIAXONE 500 MG/1
1 INJECTION, POWDER, FOR SOLUTION INTRAMUSCULAR; INTRAVENOUS ONCE
Qty: 0 | Refills: 0 | Status: DISCONTINUED | OUTPATIENT
Start: 2018-03-15 | End: 2018-03-15

## 2018-03-15 RX ORDER — TAMSULOSIN HYDROCHLORIDE 0.4 MG/1
0.4 CAPSULE ORAL AT BEDTIME
Qty: 0 | Refills: 0 | Status: DISCONTINUED | OUTPATIENT
Start: 2018-03-15 | End: 2018-03-20

## 2018-03-15 RX ORDER — AMIODARONE HYDROCHLORIDE 400 MG/1
1 TABLET ORAL
Qty: 0 | Refills: 0 | COMMUNITY

## 2018-03-15 RX ORDER — FUROSEMIDE 40 MG
40 TABLET ORAL DAILY
Qty: 0 | Refills: 0 | Status: DISCONTINUED | OUTPATIENT
Start: 2018-03-15 | End: 2018-03-19

## 2018-03-15 RX ORDER — APIXABAN 2.5 MG/1
2.5 TABLET, FILM COATED ORAL EVERY 12 HOURS
Qty: 0 | Refills: 0 | Status: DISCONTINUED | OUTPATIENT
Start: 2018-03-15 | End: 2018-03-20

## 2018-03-15 RX ORDER — WARFARIN SODIUM 2.5 MG/1
1 TABLET ORAL
Qty: 0 | Refills: 0 | COMMUNITY

## 2018-03-15 RX ORDER — AMIODARONE HYDROCHLORIDE 400 MG/1
400 TABLET ORAL DAILY
Qty: 0 | Refills: 0 | Status: DISCONTINUED | OUTPATIENT
Start: 2018-03-15 | End: 2018-03-20

## 2018-03-15 RX ORDER — MIRTAZAPINE 45 MG/1
45 TABLET, ORALLY DISINTEGRATING ORAL AT BEDTIME
Qty: 0 | Refills: 0 | Status: DISCONTINUED | OUTPATIENT
Start: 2018-03-15 | End: 2018-03-20

## 2018-03-15 RX ORDER — FAMOTIDINE 10 MG/ML
20 INJECTION INTRAVENOUS DAILY
Qty: 0 | Refills: 0 | Status: DISCONTINUED | OUTPATIENT
Start: 2018-03-15 | End: 2018-03-20

## 2018-03-15 RX ORDER — ISOSORBIDE MONONITRATE 60 MG/1
30 TABLET, EXTENDED RELEASE ORAL DAILY
Qty: 0 | Refills: 0 | Status: DISCONTINUED | OUTPATIENT
Start: 2018-03-15 | End: 2018-03-15

## 2018-03-15 RX ORDER — FUROSEMIDE 40 MG
40 TABLET ORAL DAILY
Qty: 0 | Refills: 0 | Status: DISCONTINUED | OUTPATIENT
Start: 2018-03-15 | End: 2018-03-15

## 2018-03-15 RX ORDER — CEFTRIAXONE 500 MG/1
1 INJECTION, POWDER, FOR SOLUTION INTRAMUSCULAR; INTRAVENOUS ONCE
Qty: 0 | Refills: 0 | Status: COMPLETED | OUTPATIENT
Start: 2018-03-15 | End: 2018-03-15

## 2018-03-15 RX ORDER — POTASSIUM CHLORIDE 20 MEQ
10 PACKET (EA) ORAL DAILY
Qty: 0 | Refills: 0 | Status: DISCONTINUED | OUTPATIENT
Start: 2018-03-15 | End: 2018-03-20

## 2018-03-15 RX ADMIN — CEFTRIAXONE 100 GRAM(S): 500 INJECTION, POWDER, FOR SOLUTION INTRAMUSCULAR; INTRAVENOUS at 06:00

## 2018-03-15 RX ADMIN — LATANOPROST 1 DROP(S): 0.05 SOLUTION/ DROPS OPHTHALMIC; TOPICAL at 22:11

## 2018-03-15 RX ADMIN — ATORVASTATIN CALCIUM 20 MILLIGRAM(S): 80 TABLET, FILM COATED ORAL at 22:12

## 2018-03-15 RX ADMIN — APIXABAN 2.5 MILLIGRAM(S): 2.5 TABLET, FILM COATED ORAL at 18:07

## 2018-03-15 RX ADMIN — Medication 10 MILLIEQUIVALENT(S): at 14:39

## 2018-03-15 RX ADMIN — FAMOTIDINE 20 MILLIGRAM(S): 10 INJECTION INTRAVENOUS at 14:39

## 2018-03-15 RX ADMIN — TAMSULOSIN HYDROCHLORIDE 0.4 MILLIGRAM(S): 0.4 CAPSULE ORAL at 22:11

## 2018-03-15 NOTE — H&P ADULT - PROBLEM SELECTOR PLAN 4
Continue ceftriaxone  Urine culture Strict I's & O's, daily weights, continue furosemide, kcl, metolazone, lisinopril Patient with hx of CHF and current BNP of 7000+ however right now patient without JVD, no leg edema, no rales. Would monitor patient closely . Check Echocardiogram. Strict I's & O's, daily weights, continue furosemide, kcl, metolazone, lisinopril

## 2018-03-15 NOTE — H&P ADULT - PMH
Atrial fibrillation    CHF (congestive heart failure)  With EF of 20%  CVA (cerebral vascular accident)  in 2013 with right sided hemiparesis and dysarthria  Depression    Hypertension    Hypotension    Prostate cancer  s/p seed implants  Temporal arteritis Atrial fibrillation    CHF (congestive heart failure)  With EF of 20%  CVA (cerebral vascular accident)  in 2013 with right sided hemiparesis and dysarthria  Depression    Hypotension    Prostate cancer  s/p seed implants 2010  Temporal arteritis  in 2013

## 2018-03-15 NOTE — H&P ADULT - HISTORY OF PRESENT ILLNESS
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 As per patient, he had left arm tingling, headache, chest and abdominal pain that onset yesterday evening while he was ambulating. He describes the chest pain as a sharp pressure, which was associated with SOB and palpitations. The symptoms felt similar to when he had the stroke in 2013. The entire episode lasted for approximately 30 minutes. He states he's had generalized abdominal pain x years but reports multiple episodes of diarrhea the past 3 days, approximately 3 episodes a day. His wife is unsure whether he has diarrhea because his home health aide takes care of him, but states she smelled diarrhea a few times last week. She also mentions that the patient was coughing a lot yesterday, which was dry and nonproductive. As per the wife, the patient's HHA told her that the patient seemed more tired recently and didn't want to walk as much last week, and seemed to have trouble lifting his feet when walking. Otherwise, no fever, no vomiting, no syncope.  History limited due to patient's baseline dysarthria. Patient is a 73yo M presenting with left arm pain and tingling, headache, chest and abdominal pain x 1 day. As per patient, he had left arm tingling, headache, chest and abdominal pain that onset yesterday evening while he was ambulating. He describes the chest pain as a sharp pressure, which was associated with SOB and palpitations. The entire episode lasted for approximately 30 minutes. He states he's had generalized abdominal pain x years but reports multiple episodes of diarrhea the past 3 days, approximately 3 episodes per day. His wife is unsure whether he has diarrhea because his home health aide takes care of him, but states she smelled diarrhea a few times last week. She also mentions that the patient was coughing a lot yesterday, which was dry and nonproductive. As per the wife, the patient's HHA told her that the patient seemed more tired recently and didn't want to walk as much last week, and seemed to have trouble lifting his feet when walking. Otherwise, no fever, no vomiting, no syncope.  History limited due to patient's baseline dysarthria. Patient is a 71yo M presenting with left arm pain and tingling, headache, chest and abdominal pain x 1 day. As per patient, he had left arm tingling, headache, chest and abdominal pain that onset yesterday evening while he was ambulating. He describes the chest pain as a sharp pressure, non-pleuritic, which radiated to his LUE and was associated with SOB and palpitations. The entire episode lasted for approximately 30 minutes. He states he's had generalized abdominal pain x years but reports multiple episodes of diarrhea the past 3 days, approximately 3 episodes per day. His wife is unsure whether he has diarrhea because his home health aide takes care of him, but states she smelled diarrhea a few times last week. She also mentions that the patient was coughing a lot yesterday, which was dry and nonproductive. As per the wife, the patient's HHA told her that the patient seemed more tired recently and didn't want to walk as much last week, and seemed to have trouble lifting his feet when walking. Otherwise, no fever, no vomiting, no syncope. History is limited & difficult to obtain due to patient's baseline dysarthria.

## 2018-03-15 NOTE — H&P ADULT - NEUROLOGICAL DETAILS
strength: L > R. B/L LE strength 5/5./responds to verbal commands/sensation intact/cranial nerves intact/strength decreased sensation intact/strength decreased/responds to verbal commands/ strength: L > R. B/L LE strength 5/5.

## 2018-03-15 NOTE — H&P ADULT - RS GEN PE MLT RESP DETAILS PC
normal/no rales/no rhonchi/no wheezes/good air movement/clear to auscultation bilaterally/respirations non-labored/airway patent/breath sounds equal

## 2018-03-15 NOTE — H&P ADULT - PROBLEM SELECTOR PLAN 2
Consider CT head and neurology consult Check CT Head Continue ceftriaxone, check Urine culture Patient with (+) UA. Continue ceftriaxone, check Urine culture

## 2018-03-15 NOTE — H&P ADULT - NSHPLABSRESULTS_GEN_ALL_CORE
CBC Full  -  ( 15 Mar 2018 09:40 )  WBC Count : 8.61 K/uL  Hemoglobin : 12.0 g/dL  Hematocrit : 37.3 %  Platelet Count - Automated : 207 K/uL  Mean Cell Volume : 102.5 fL  Mean Cell Hemoglobin : 33.0 pg  Mean Cell Hemoglobin Concentration : 32.2 %  Auto Neutrophil # : 5.22 K/uL  Auto Lymphocyte # : 2.34 K/uL  Auto Monocyte # : 0.93 K/uL  Auto Eosinophil # : 0.04 K/uL  Auto Basophil # : 0.04 K/uL  Auto Neutrophil % : 60.5 %  Auto Lymphocyte % : 27.2 %  Auto Monocyte % : 10.8 %  Auto Eosinophil % : 0.5 %  Auto Basophil % : 0.5 %    PT/INR - ( 14 Mar 2018 23:49 )   PT: 14.2 SEC;   INR: 1.27     PTT - ( 14 Mar 2018 23:49 )  PTT:39.8 SEC    03-15    141  |  101  |  36<H>  ----------------------------<  103<H>  4.7   |  24  |  1.55<H>    Ca    8.8      15 Mar 2018 06:30  Phos  3.7     -15  Mg     2.5     -15    TPro  7.2  /  Alb  3.7  /  TBili  1.0  /  DBili  x   /  AST  24  /  ALT  19  /  AlkPhos  145<H>  03-14    Serum Pro-Brain Natriuretic Peptide (18 @ 23:49)    Serum Pro-Brain Natriuretic Peptide: 7081  Troponin T, Serum (03.15.18 @ 06:30)    Troponin T, Serum: 0.13:   Troponin T, Serum (18 @ 23:49)    Troponin T, Serum: 0.15    Blood Gas Venous Comprehensive (18 @ 23:49)    Blood Gas Venous - Lactate: 2.2: Please note updated reference range. mmol/L    Blood Gas Venous - Chloride: 103 mmol/L    Blood Gas Venous - Creatinine: 1.46 mg/dL    pH, Venous: 7.38 pH    pCO2, Venous: 50 mmHg    pO2, Venous: < 24 mmHg    HCO3, Venous: 25 mmol/L    Base Excess, Venous: 3.7: REFERENCE RANGE = -3 + 2 mmol/L mmol/L    Oxygen Saturation, Venous: 22.3 %    Blood Gas Venous - Sodium: 135 mmol/L    Blood Gas Venous - Potassium: 4.6 mmol/L    Blood Gas Venous - Glucose: 117    Blood Gas Venous - Hemoglobin: 12.8 g/dL    Blood Gas Venous - Hematocrit: 39.4 %    Urinalysis Basic - ( 15 Mar 2018 03:30 )    Color: YELLOW / Appearance: HAZY / S.017 / pH: 6.0  Gluc: NEGATIVE / Ketone: NEGATIVE  / Bili: NEGATIVE / Urobili: 2 mg/dL   Blood: MODERATE / Protein: 100 mg/dL / Nitrite: NEGATIVE   Leuk Esterase: LARGE / RBC: 25-50 / WBC >50   Sq Epi: OCC / Non Sq Epi: x / Bacteria: FEW    EKG: Atrial sensed ventricular paced rhythm     CXR: no effusions or consolidations

## 2018-03-15 NOTE — H&P ADULT - ATTENDING COMMENTS
Agree with PA Note Above, edits made where appropriate, case discussed with PA    Patient seen and examined. This is a 72M, very poor historian who presented from with chest pain a/w left arm tingling, abdominal pain, and headache. Given this HPI, hx of CHF/Afib, and CVA concern for coronary disease, and therefore admitted. Clinically however, patient appears fine, he is unable to elaborate well on history as the answer to every question is "yes" even if the question does not have a yes/no answer. I attempted to contact Ms. Robyn Salomon, the patient's wife via phone and was not successful.   Vital Signs Last 24 Hrs  T(C): 36.8 (15 Mar 2018 12:21), Max: 37.1 (14 Mar 2018 23:55)  T(F): 98.2 (15 Mar 2018 12:21), Max: 98.7 (14 Mar 2018 23:55)  HR: 63 (15 Mar 2018 12:21) (53 - 68)  BP: 103/68 (15 Mar 2018 12:21) (94/65 - 132/80)  BP(mean): --  RR: 18 (15 Mar 2018 12:21) (14 - 18)  SpO2: 100% (15 Mar 2018 12:21) (98% - 100%)  General: NAD, non-toxic appearing  HEENT: EOMI, PERRLA, no conjunctival pallor, MMM, no JVD, no thyromegaly, neck supple, trachea midline  CV: S1S2 RRR no MRG +PPM chest wall left   Lungs: CTA BL  Abdomen: soft NTND +BS   Extremities: No CCE +WWP  Skin/MSK: No rashes, preserved ROM on active & passive movement  Neuro: AAOx2 (self and place), right facial droop,  no focal deficits, no sensory deficits  Labs, imaging, EKG reviewed  A/P: 72M presenting with typical chest pain being admitted for rule out ACS, clinically stable  1. Angina - Telemetry, serial EKGs, trend troponins to peak, would continue to monitor clinically and call cardiology consult for input, BNP 7000+ on this admission   2. UTI - ceftriaxone and UCx follow up  3. BRYCE - likely pre-renal given CHF c/w lasix and tx of uti  4. CVA - c/w eliquis and statin for secondary stroke prevention  5. AFib - Eliquis for AC, Coreg and Amioarone for rate control Agree with PA Note Above, edits made where appropriate, case discussed with PA    Patient seen and examined. This is a 72M, very poor historian who presented from with chest pain a/w left arm tingling, abdominal pain, and headache. Given this HPI, hx of CHF/Afib, and CVA concern for coronary disease, and therefore admitted. Clinically however, patient appears fine, he is unable to elaborate well on history as the answer to every question is "yes" even if the question does not have a yes/no answer. I attempted to contact Ms. Robyn Salomon, the patient's wife via phone and was not successful.   Vital Signs Last 24 Hrs  T(C): 36.8 (15 Mar 2018 12:21), Max: 37.1 (14 Mar 2018 23:55)  T(F): 98.2 (15 Mar 2018 12:21), Max: 98.7 (14 Mar 2018 23:55)  HR: 63 (15 Mar 2018 12:21) (53 - 68)  BP: 103/68 (15 Mar 2018 12:21) (94/65 - 132/80)  BP(mean): --  RR: 18 (15 Mar 2018 12:21) (14 - 18)  SpO2: 100% (15 Mar 2018 12:21) (98% - 100%)  General: NAD, non-toxic appearing  HEENT: EOMI, PERRLA, no conjunctival pallor, MMM, no JVD, no thyromegaly, neck supple, trachea midline  CV: S1S2 RRR no MRG +PPM chest wall left   Lungs: CTA BL  Abdomen: soft NTND +BS   Extremities: No CCE +WWP  Skin/MSK: No rashes, preserved ROM on active & passive movement  Neuro: AAOx2 (self and place), right facial droop,  no focal deficits, no sensory deficits  Labs, imaging, EKG reviewed  A/P: 72M presenting with typical chest pain being admitted for rule out ACS, clinically stable  1. Angina - Telemetry, serial EKGs, trend troponins to peak, would continue to monitor clinically and call cardiology consult for input, BNP 7000+ on this admission, would also check echocardiogram   2. UTI - ceftriaxone and UCx follow up  3. BRYCE - likely pre-renal given CHF c/w lasix and tx of uti  4. CVA - c/w eliquis and statin for secondary stroke prevention  5. AFib - Eliquis for AC, Coreg and Amioarone for rate control Agree with PA Note Above, edits made where appropriate, case discussed with PA    Patient seen and examined. This is a 72M, very poor historian who presented from with chest pain a/w left arm tingling, abdominal pain, and headache. Given this HPI, hx of CHF/Afib, and CVA concern for coronary disease, and therefore admitted. Clinically however, patient appears fine, he is unable to elaborate well on history as the answer to every question is "yes" even if the question does not have a yes/no answer. I attempted to contact Ms. Robyn Salomon, the patient's wife via phone and was not successful.   Vital Signs Last 24 Hrs  T(C): 36.8 (15 Mar 2018 12:21), Max: 37.1 (14 Mar 2018 23:55)  T(F): 98.2 (15 Mar 2018 12:21), Max: 98.7 (14 Mar 2018 23:55)  HR: 63 (15 Mar 2018 12:21) (53 - 68)  BP: 103/68 (15 Mar 2018 12:21) (94/65 - 132/80)  BP(mean): --  RR: 18 (15 Mar 2018 12:21) (14 - 18)  SpO2: 100% (15 Mar 2018 12:21) (98% - 100%)  General: NAD, non-toxic appearing, no respiratory distress  HEENT: EOMI, PERRLA, no conjunctival pallor, MMM, no JVD, no thyromegaly, neck supple, trachea midline  CV: S1S2 RRR no MRG +PPM chest wall left   Lungs: good air movement, scant wheezes but otherwise clear   Abdomen: soft NTND +BS   Extremities: No CCE +WWP  Skin/MSK: No rashes, preserved ROM on active & passive movement  Neuro: AAOx2 (self and place), right facial droop,  no focal deficits, no sensory deficits  Labs, imaging, EKG reviewed  A/P: 72M presenting with typical chest pain being admitted for rule out ACS, clinically stable  1. Angina - Patient with "chest pain" with left arm involvement however patient on my exam was observed to be resting comfortably - not in respiratory distress, non-toxic appearing, no pitting edema. It is difficult to elicit ROS directly from him as he seems to say "yes" to every question - e.g. "did you have chest pain? 'yes' " "so you've NEVER had chest pain 'yes' " Would admit to Telemetry, serial EKGs, trend troponins to peak, would continue to monitor clinically and call cardiology consult for input because BNP 7000+ on this admission with small troponin elevation - however these labs DO NOT clinically correlate with patient's physical exam.  would also check echocardiogram   2. UTI - ceftriaxone and UCx follow up  3. BRYCE - likely pre-renal given CHF c/w lasix and tx of uti  4. CVA - c/w eliquis and statin for secondary stroke prevention  5. AFib - Eliquis for AC, Coreg and Amioarone for rate control

## 2018-03-15 NOTE — H&P ADULT - NSHPPHYSICALEXAM_GEN_ALL_CORE
Vital Signs Last 24 Hrs  T(C): 36.8 (15 Mar 2018 12:21), Max: 37.1 (14 Mar 2018 23:55)  T(F): 98.2 (15 Mar 2018 12:21), Max: 98.7 (14 Mar 2018 23:55)  HR: 63 (15 Mar 2018 12:21) (53 - 68)  BP: 103/68 (15 Mar 2018 12:21) (94/65 - 132/80)  BP(mean): --  RR: 18 (15 Mar 2018 12:21) (14 - 18)  SpO2: 100% (15 Mar 2018 12:21) (98% - 100%)

## 2018-03-15 NOTE — H&P ADULT - PROBLEM SELECTOR PLAN 3
Strict I/O's   IVF Monitor BUN/ creat GFR 36%, Monitor BUN/ creat, consider holding ACEI if creat worsens Patient with hx of Stage 3 CKD. Currently with BRYCE, GFR 36%, Monitor BUN/ creat, consider holding ACEI if creatinine worsens. Would

## 2018-03-15 NOTE — H&P ADULT - NSHPSOCIALHISTORY_GEN_ALL_CORE
Patient lives at home with his wife. He has a home health aide, 7 hours a day x 7 days a week.   Patient ambulates with a cane at baseline since his L hip replacement.   Patient is retired.   Patient has been a smoker x years and as per wife, still smokes sometimes. Patient states he quit 1 year ago.   Patient smokes marijuana x years and last smoked marijuana last week. Patient does not drink ETOH or use any other illicit drugs.   Patient received both the flu and pneumovax shots this year. Patient is retired, lives at home with his wife. He has a home health aide, 7 hours a day x 7 days a week.   Patient ambulates with a cane at baseline since his L hip replacement.   Patient has been a smoker x years and as per wife, still smokes sometimes. Patient states he quit 1 year ago.   Patient smokes marijuana x years and last smoked marijuana last week. Patient does not drink ETOH or use any other illicit drugs.   Patient received both the flu and pneumovax shots this year.

## 2018-03-15 NOTE — ED ADULT NURSE REASSESSMENT NOTE - NS ED NURSE REASSESS COMMENT FT1
patient very sleepy at start of shift, v/s WNL as documented, mumbled speech but able to follow commands, has hx of CVA, dysphagia screening complete and patient passed, able to sit up and have breakfast, repeat morning labs sent, cm ventricular paced.

## 2018-03-15 NOTE — ED ADULT NURSE REASSESSMENT NOTE - NS ED NURSE REASSESS COMMENT FT1
pt aox to himself and situation; straight cath done at this time; UA sent. Pt tolerated well in no acute distress at this time. Will continue to monitor/assess

## 2018-03-15 NOTE — H&P ADULT - ASSESSMENT
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 EKG- A-Paced @ 57

## 2018-03-16 LAB
ALBUMIN SERPL ELPH-MCNC: 3.5 G/DL — SIGNIFICANT CHANGE UP (ref 3.3–5)
ALP SERPL-CCNC: 144 U/L — HIGH (ref 40–120)
ALT FLD-CCNC: 17 U/L — SIGNIFICANT CHANGE UP (ref 4–41)
AST SERPL-CCNC: 26 U/L — SIGNIFICANT CHANGE UP (ref 4–40)
BASOPHILS # BLD AUTO: 0.04 K/UL — SIGNIFICANT CHANGE UP (ref 0–0.2)
BASOPHILS NFR BLD AUTO: 0.6 % — SIGNIFICANT CHANGE UP (ref 0–2)
BILIRUB SERPL-MCNC: 0.9 MG/DL — SIGNIFICANT CHANGE UP (ref 0.2–1.2)
BUN SERPL-MCNC: 36 MG/DL — HIGH (ref 7–23)
CALCIUM SERPL-MCNC: 9 MG/DL — SIGNIFICANT CHANGE UP (ref 8.4–10.5)
CHLORIDE SERPL-SCNC: 100 MMOL/L — SIGNIFICANT CHANGE UP (ref 98–107)
CO2 SERPL-SCNC: 22 MMOL/L — SIGNIFICANT CHANGE UP (ref 22–31)
CREAT SERPL-MCNC: 1.69 MG/DL — HIGH (ref 0.5–1.3)
EOSINOPHIL # BLD AUTO: 0.04 K/UL — SIGNIFICANT CHANGE UP (ref 0–0.5)
EOSINOPHIL NFR BLD AUTO: 0.6 % — SIGNIFICANT CHANGE UP (ref 0–6)
GLUCOSE SERPL-MCNC: 87 MG/DL — SIGNIFICANT CHANGE UP (ref 70–99)
HBA1C BLD-MCNC: 6.6 % — HIGH (ref 4–5.6)
HCT VFR BLD CALC: 38.8 % — LOW (ref 39–50)
HGB BLD-MCNC: 13.1 G/DL — SIGNIFICANT CHANGE UP (ref 13–17)
IMM GRANULOCYTES # BLD AUTO: 0.04 # — SIGNIFICANT CHANGE UP
IMM GRANULOCYTES NFR BLD AUTO: 0.6 % — SIGNIFICANT CHANGE UP (ref 0–1.5)
LYMPHOCYTES # BLD AUTO: 2.22 K/UL — SIGNIFICANT CHANGE UP (ref 1–3.3)
LYMPHOCYTES # BLD AUTO: 32.9 % — SIGNIFICANT CHANGE UP (ref 13–44)
MAGNESIUM SERPL-MCNC: 2.6 MG/DL — SIGNIFICANT CHANGE UP (ref 1.6–2.6)
MCHC RBC-ENTMCNC: 33.8 % — SIGNIFICANT CHANGE UP (ref 32–36)
MCHC RBC-ENTMCNC: 34.8 PG — HIGH (ref 27–34)
MCV RBC AUTO: 103.2 FL — HIGH (ref 80–100)
MONOCYTES # BLD AUTO: 0.73 K/UL — SIGNIFICANT CHANGE UP (ref 0–0.9)
MONOCYTES NFR BLD AUTO: 10.8 % — SIGNIFICANT CHANGE UP (ref 2–14)
NEUTROPHILS # BLD AUTO: 3.68 K/UL — SIGNIFICANT CHANGE UP (ref 1.8–7.4)
NEUTROPHILS NFR BLD AUTO: 54.5 % — SIGNIFICANT CHANGE UP (ref 43–77)
NRBC # FLD: 0.05 — SIGNIFICANT CHANGE UP
PHOSPHATE SERPL-MCNC: 3.1 MG/DL — SIGNIFICANT CHANGE UP (ref 2.5–4.5)
PLATELET # BLD AUTO: 186 K/UL — SIGNIFICANT CHANGE UP (ref 150–400)
PMV BLD: 11.6 FL — SIGNIFICANT CHANGE UP (ref 7–13)
POTASSIUM SERPL-MCNC: 5.1 MMOL/L — SIGNIFICANT CHANGE UP (ref 3.5–5.3)
POTASSIUM SERPL-SCNC: 5.1 MMOL/L — SIGNIFICANT CHANGE UP (ref 3.5–5.3)
PROT SERPL-MCNC: 7.1 G/DL — SIGNIFICANT CHANGE UP (ref 6–8.3)
RBC # BLD: 3.76 M/UL — LOW (ref 4.2–5.8)
RBC # FLD: 17.2 % — HIGH (ref 10.3–14.5)
SODIUM SERPL-SCNC: 138 MMOL/L — SIGNIFICANT CHANGE UP (ref 135–145)
SPECIMEN SOURCE: SIGNIFICANT CHANGE UP
TROPONIN T SERPL-MCNC: 0.13 NG/ML — HIGH (ref 0–0.06)
WBC # BLD: 6.75 K/UL — SIGNIFICANT CHANGE UP (ref 3.8–10.5)
WBC # FLD AUTO: 6.75 K/UL — SIGNIFICANT CHANGE UP (ref 3.8–10.5)

## 2018-03-16 PROCEDURE — 99223 1ST HOSP IP/OBS HIGH 75: CPT

## 2018-03-16 PROCEDURE — 99233 SBSQ HOSP IP/OBS HIGH 50: CPT

## 2018-03-16 RX ADMIN — FAMOTIDINE 20 MILLIGRAM(S): 10 INJECTION INTRAVENOUS at 09:32

## 2018-03-16 RX ADMIN — APIXABAN 2.5 MILLIGRAM(S): 2.5 TABLET, FILM COATED ORAL at 17:09

## 2018-03-16 RX ADMIN — LISINOPRIL 10 MILLIGRAM(S): 2.5 TABLET ORAL at 06:01

## 2018-03-16 RX ADMIN — TAMSULOSIN HYDROCHLORIDE 0.4 MILLIGRAM(S): 0.4 CAPSULE ORAL at 23:48

## 2018-03-16 RX ADMIN — LATANOPROST 1 DROP(S): 0.05 SOLUTION/ DROPS OPHTHALMIC; TOPICAL at 23:48

## 2018-03-16 RX ADMIN — APIXABAN 2.5 MILLIGRAM(S): 2.5 TABLET, FILM COATED ORAL at 06:59

## 2018-03-16 RX ADMIN — Medication 10 MILLIEQUIVALENT(S): at 09:32

## 2018-03-16 RX ADMIN — Medication 40 MILLIGRAM(S): at 06:01

## 2018-03-16 RX ADMIN — CARVEDILOL PHOSPHATE 6.25 MILLIGRAM(S): 80 CAPSULE, EXTENDED RELEASE ORAL at 17:09

## 2018-03-16 RX ADMIN — ATORVASTATIN CALCIUM 20 MILLIGRAM(S): 80 TABLET, FILM COATED ORAL at 23:48

## 2018-03-16 RX ADMIN — CARVEDILOL PHOSPHATE 6.25 MILLIGRAM(S): 80 CAPSULE, EXTENDED RELEASE ORAL at 06:01

## 2018-03-16 RX ADMIN — CEFTRIAXONE 100 GRAM(S): 500 INJECTION, POWDER, FOR SOLUTION INTRAMUSCULAR; INTRAVENOUS at 06:01

## 2018-03-16 RX ADMIN — AMIODARONE HYDROCHLORIDE 400 MILLIGRAM(S): 400 TABLET ORAL at 06:01

## 2018-03-16 NOTE — CONSULT NOTE ADULT - SUBJECTIVE AND OBJECTIVE BOX
Patient seen and evaluated at bedside    Chief Complaint:    HPI:  Patient is a 73yo M presenting with left arm pain and tingling, headache, chest and abdominal pain x 1 day. As per patient, he had left arm tingling, headache, chest and abdominal pain that onset yesterday evening while he was ambulating. He describes the chest pain as a sharp pressure, non-pleuritic, which radiated to his LUE and was associated with SOB and palpitations. The entire episode lasted for approximately 30 minutes. He states he's had generalized abdominal pain x years but reports multiple episodes of diarrhea the past 3 days, approximately 3 episodes per day. His wife is unsure whether he has diarrhea because his home health aide takes care of him, but states she smelled diarrhea a few times last week. She also mentions that the patient was coughing a lot yesterday, which was dry and nonproductive. As per the wife, the patient's HHA told her that the patient seemed more tired recently and didn't want to walk as much last week, and seemed to have trouble lifting his feet when walking. Otherwise, no fever, no vomiting, no syncope. History is limited & difficult to obtain due to patient's baseline dysarthria. (15 Mar 2018 10:02)      PMH:   Hypertension  Depression  Prostate cancer  Hypotension  Temporal arteritis  CVA (cerebral vascular accident)  CHF (congestive heart failure)  Atrial fibrillation      PSH:   History of hip replacement      Medications:   amiodarone    Tablet 400 milliGRAM(s) Oral daily  apixaban 2.5 milliGRAM(s) Oral every 12 hours  atorvastatin 20 milliGRAM(s) Oral at bedtime  carvedilol 6.25 milliGRAM(s) Oral every 12 hours  cefTRIAXone   IVPB 1 Gram(s) IV Intermittent every 24 hours  famotidine    Tablet 20 milliGRAM(s) Oral daily  furosemide    Tablet 40 milliGRAM(s) Oral daily  latanoprost 0.005% Ophthalmic Solution 1 Drop(s) Both EYES at bedtime  lisinopril 10 milliGRAM(s) Oral daily  metolazone 2.5 milliGRAM(s) Oral <User Schedule>  mirtazapine 45 milliGRAM(s) Oral at bedtime PRN  potassium chloride    Tablet ER 10 milliEquivalent(s) Oral daily  tamsulosin 0.4 milliGRAM(s) Oral at bedtime      Allergies:  No Known Allergies      FAMILY HISTORY:  Family history of cancer (Sibling)      Social History:  Smoking History:  Alcohol Use:  Drug Use:    Review of Systems:  REVIEW OF SYSTEMS:    CONSTITUTIONAL: No weakness, fevers or chills  EYES/ENT: No visual changes;  No dysphagia  NECK: No pain or stiffness  RESPIRATORY: No cough, wheezing, hemoptysis; No shortness of breath  CARDIOVASCULAR: No chest pain or palpitations; No lower extremity edema  GASTROINTESTINAL: No abdominal or epigastric pain. No nausea, vomiting, or hematemesis; No diarrhea or constipation. No melena or hematochezia.  BACK: No back pain  GENITOURINARY: No dysuria, frequency or hematuria  NEUROLOGICAL: No numbness or weakness  SKIN: No itching, burning, rashes, or lesions   All other review of systems is negative unless indicated above.    Physical Exam:  T(F): 97.6 (), Max: 98.4 ()  HR: 68 () (62 - 68)  BP: 110/78 () (90/60 - 121/83)  RR: 18 ()  SpO2: 100% ()  GENERAL: No acute distress, well-developed  HEAD:  Atraumatic, Normocephalic  ENT: EOMI, PERRLA, conjunctiva and sclera clear, Neck supple, No JVD, moist mucosa  CHEST/LUNG: Clear to auscultation bilaterally; No wheeze, equal breath sounds bilaterally   BACK: No spinal tenderness  HEART: Regular rate and rhythm; No murmurs, rubs, or gallops  ABDOMEN: Soft, Nontender, Nondistended; Bowel sounds present  EXTREMITIES:  No clubbing, cyanosis, or edema  PSYCH: Nl behavior, nl affect  NEUROLOGY: AAOx3, non-focal, cranial nerves intact  SKIN: Normal color, No rashes or lesions  LINES:    Cardiovascular Diagnostic Testing:    ECG: Personally reviewed    Echo:    Stress Testing:    Cath:    Interpretation of Telemetry:    Imaging:    Labs: Personally reviewed                        13.1   6.75  )-----------( 186      ( 16 Mar 2018 05:40 )             38.8         138  |  100  |  36<H>  ----------------------------<  87  5.1   |  22  |  1.69<H>    Ca    9.0      16 Mar 2018 05:40  Phos  3.1       Mg     2.6         TPro  7.1  /  Alb  3.5  /  TBili  0.9  /  DBili  x   /  AST  26  /  ALT  17  /  AlkPhos  144<H>      PT/INR - ( 14 Mar 2018 23:49 )   PT: 14.2 SEC;   INR: 1.27          PTT - ( 14 Mar 2018 23:49 )  PTT:39.8 SEC  CARDIAC MARKERS ( 16 Mar 2018 05:40 )  x     / 0.13 ng/mL / x     / x     / x      CARDIAC MARKERS ( 15 Mar 2018 06:30 )  x     / 0.13 ng/mL / 96 u/L / 2.79 ng/mL / x      CARDIAC MARKERS ( 14 Mar 2018 23:49 )  x     / 0.15 ng/mL / 107 u/L / 2.68 ng/mL / x          Serum Pro-Brain Natriuretic Peptide: 7081 pg/mL ( @ 23:49)    Total Cholesterol: 162  LDL: 117  HDL: 34  T    Hemoglobin A1C, Whole Blood: 6.6 % ( @ 05:40)    Thyroid Stimulating Hormone, Serum: 1.98 uIU/mL (03-15 @ 06:30) Chief Complaint:  chest pain    HPI:  Patient unable to provide history, per chart:    Patient is a 71yo M presenting with left arm pain and tingling, headache, chest and abdominal pain x 1 day. As per patient, he had left arm tingling, headache, chest and abdominal pain that onset yesterday evening while he was ambulating. He describes the chest pain as a sharp pressure, non-pleuritic, which radiated to his LUE and was associated with SOB and palpitations. The entire episode lasted for approximately 30 minutes. He states he's had generalized abdominal pain x years but reports multiple episodes of diarrhea the past 3 days, approximately 3 episodes per day. His wife is unsure whether he has diarrhea because his home health aide takes care of him, but states she smelled diarrhea a few times last week. She also mentions that the patient was coughing a lot yesterday, which was dry and nonproductive. As per the wife, the patient's HHA told her that the patient seemed more tired recently and didn't want to walk as much last week, and seemed to have trouble lifting his feet when walking. Otherwise, no fever, no vomiting, no syncope. History is limited & difficult to obtain due to patient's baseline dysarthria. (15 Mar 2018 10:02)      PMH:   Hypertension  Depression  Prostate cancer  Hypotension  Temporal arteritis  CVA (cerebral vascular accident)  CHF (congestive heart failure)  Atrial fibrillation      PSH:   History of hip replacement      Medications:   amiodarone    Tablet 400 milliGRAM(s) Oral daily  apixaban 2.5 milliGRAM(s) Oral every 12 hours  atorvastatin 20 milliGRAM(s) Oral at bedtime  carvedilol 6.25 milliGRAM(s) Oral every 12 hours  cefTRIAXone   IVPB 1 Gram(s) IV Intermittent every 24 hours  famotidine    Tablet 20 milliGRAM(s) Oral daily  furosemide    Tablet 40 milliGRAM(s) Oral daily  latanoprost 0.005% Ophthalmic Solution 1 Drop(s) Both EYES at bedtime  lisinopril 10 milliGRAM(s) Oral daily  metolazone 2.5 milliGRAM(s) Oral <User Schedule>  mirtazapine 45 milliGRAM(s) Oral at bedtime PRN  potassium chloride    Tablet ER 10 milliEquivalent(s) Oral daily  tamsulosin 0.4 milliGRAM(s) Oral at bedtime      Allergies:  No Known Allergies      FAMILY HISTORY:  Family history of cancer (Sibling)      Social History:  Smoking History: occasional  Alcohol Use: denies  Drug Use: marijuana use    Review of Systems:  REVIEW OF SYSTEMS:    CONSTITUTIONAL: +weakness, fevers or chills  EYES/ENT: No visual changes;  No dysphagia  NECK: No pain or stiffness  RESPIRATORY: No cough, wheezing, hemoptysis; +shortness of breath  CARDIOVASCULAR: +chest pain or palpitations; No lower extremity edema  GASTROINTESTINAL: No abdominal or epigastric pain. No nausea, vomiting, or hematemesis; No diarrhea or constipation. No melena or hematochezia.  BACK: No back pain  GENITOURINARY: No dysuria, frequency or hematuria  NEUROLOGICAL: No numbness or weakness  SKIN: No itching, burning, rashes, or lesions   All other review of systems is negative unless indicated above.    Physical Exam:  T(F): 97.6 (-16), Max: 98.4 (-16)  HR: 68 () (62 - 68)  BP: 110/78 (-16) (90/60 - 121/83)  RR: 18 (-16)  SpO2: 100% (-16)  GENERAL: No acute distress  HEAD:  Atraumatic  ENT: EOMI  CHEST/LUNG: Clear to auscultation bilaterally  HEART: Regular rate and rhythm; No murmurs, rubs, or gallops  ABDOMEN: Soft, Nontender  EXTREMITIES:  Trace LE edema  NEUROLOGY: patient only able to answer yes or now, otherwise aphasic    ECG: Asensed, Vpaced    TTE:  CONCLUSIONS:  1. Tethered mitral valve leaflets with normal opening.  Mild-moderate mitral regurgitation.  2. Severe concentric left ventricular hypertrophy.  3. Severe global left ventricular systolic dysfunction.  4. Moderate right atrial enlargement.  5. Normal right ventricular size with decreased right  ventricular systolic function. A device wire is noted in  the right heart.  *** Compared with echocardiogram of 2016, no  significant changes noted.    Stress Test:  IMPRESSIONS:Abnormal Study  * Myocardial Perfusion SPECT results are abnormal.  * There are small, moderate to severe defects in basal  inferior, basal inferolateral walls that are predominantly  fixed consistent with infarct with mild reina-infarct  ischemia.  * Post-stress gated wall motion analysis was performed  (LVEF = 26 %;LVEDV = 93 ml.), revealing severe overall  hypokinesis with akinesis of the inferior wall and severe  diffuse hypokinesis of the remaining segments. There was  paradoxical motion of the septum (LBBB). RV function  appeared normal.      Labs: Personally reviewed                        13.1   6.75  )-----------( 186      ( 16 Mar 2018 05:40 )             38.8         138  |  100  |  36<H>  ----------------------------<  87  5.1   |  22  |  1.69<H>    Ca    9.0      16 Mar 2018 05:40  Phos  3.1       Mg     2.6         TPro  7.1  /  Alb  3.5  /  TBili  0.9  /  DBili  x   /  AST  26  /  ALT  17  /  AlkPhos  144<H>      PT/INR - ( 14 Mar 2018 23:49 )   PT: 14.2 SEC;   INR: 1.27          PTT - ( 14 Mar 2018 23:49 )  PTT:39.8 SEC  CARDIAC MARKERS ( 16 Mar 2018 05:40 )  x     / 0.13 ng/mL / x     / x     / x      CARDIAC MARKERS ( 15 Mar 2018 06:30 )  x     / 0.13 ng/mL / 96 u/L / 2.79 ng/mL / x      CARDIAC MARKERS ( 14 Mar 2018 23:49 )  x     / 0.15 ng/mL / 107 u/L / 2.68 ng/mL / x          Serum Pro-Brain Natriuretic Peptide: 7081 pg/mL ( @ 23:49)    Total Cholesterol: 162  LDL: 117  HDL: 34  T    Hemoglobin A1C, Whole Blood: 6.6 % ( @ 05:40)    Thyroid Stimulating Hormone, Serum: 1.98 uIU/mL (03-15 @ 06:30)

## 2018-03-16 NOTE — PHYSICAL THERAPY INITIAL EVALUATION ADULT - GAIT DEVIATIONS NOTED, PT EVAL
decreased stride length/decreased michael/decreased velocity of limb motion/decreased step length/decreased weight-shifting ability

## 2018-03-16 NOTE — SWALLOW BEDSIDE ASSESSMENT ADULT - SWALLOW EVAL: RECOMMENDED FEEDING/EATING TECHNIQUES
maintain upright posture during/after eating for 30 mins/allow for swallow between intakes/crush medication (when feasible)/position upright (90 degrees)/small sips/bites

## 2018-03-16 NOTE — SWALLOW BEDSIDE ASSESSMENT ADULT - ASR SWALLOW ASPIRATION MONITOR
change of breathing pattern/position upright (90Y)/cough/gurgly voice/pneumonia/oral hygiene/throat clearing/fever/upper respiratory infection

## 2018-03-16 NOTE — PHYSICAL THERAPY INITIAL EVALUATION ADULT - PATIENT PROFILE REVIEW, REHAB EVAL
PT orders received-->out of bed with assistance. Consult with ZAK SCHMITZ-->pt OK to participate in PT evaluation./yes

## 2018-03-16 NOTE — PHYSICAL THERAPY INITIAL EVALUATION ADULT - PERTINENT HX OF CURRENT PROBLEM, REHAB EVAL
Patient is a 72 year old male admitted to [facility] on 3/15 with left arm pain and tingling, headache, chest and abdominal pain. 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

## 2018-03-16 NOTE — PROGRESS NOTE ADULT - ASSESSMENT
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 EKG- A-Paced @ 57

## 2018-03-16 NOTE — PROGRESS NOTE ADULT - PROBLEM SELECTOR PLAN 1
Based on HPI, patient might have been having typical chest pain. Given the mildly elevated troponins and BNP of 7000+ there is concern for some cardiac etiology. Currently however patient appears stable. Cont Telemonitoring, Serial EKGs & CE's, Cards consult pending, f.u recs

## 2018-03-16 NOTE — PROGRESS NOTE ADULT - PROBLEM SELECTOR PLAN 3
Patient with hx of Stage 3 CKD. Currently with BRYCE, GFR 36%, Monitor BUN/ creat, creatinine mildly elevated today, will consider holding ACEI if creatinine worsens.

## 2018-03-16 NOTE — CONSULT NOTE ADULT - ASSESSMENT
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 Found to have mildly elevated troponin. Troponin elevation likely demand ischemia in setting of CKD. Not concerned for ACS. No need to further trend CE. Patient currently asymptomatic, does not look overtly fluid overloaded. No further cardiac workup at this time.   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 Found to have mildly elevated troponin. Troponin elevation likely demand ischemia in setting of CKD. Not concerned for ACS. No need to further trend CE. Patient currently asymptomatic, does not look overtly fluid overloaded. No further cardiac workup at this time. Last TTE and stress test above. Continue home cardiac medications. K>4, Mg>2

## 2018-03-16 NOTE — PHYSICAL THERAPY INITIAL EVALUATION ADULT - ADDITIONAL COMMENTS
Patient reports he lives with his wife in a private house. He has a home health aide, 7 hours a day x 7 days a week. Patient ambulates with a single axis cane at baseline.

## 2018-03-16 NOTE — SWALLOW BEDSIDE ASSESSMENT ADULT - SWALLOW EVAL: CRITERIA FOR SKILLED INTERVENTION MET
MD to reconsult this department when overall medical status improves to determine tolerance for diet upgrade/not appropriate for swallowing intervention demonstrates skilled criteria for swallowing intervention

## 2018-03-16 NOTE — PROGRESS NOTE ADULT - PROBLEM SELECTOR PLAN 4
Patient with hx of CHF and current BNP of 7000+ however right now patient without JVD, no leg edema, no rales. Would monitor patient closely . Check Echocardiogram. Strict I's & O's, daily weights, continue furosemide, kcl, metolazone, coreg, lisinopril, f/u cards recs

## 2018-03-16 NOTE — SWALLOW BEDSIDE ASSESSMENT ADULT - COMMENTS
The patient is a 73 y/o male with h/o CVA 2013 (with residual right side seng and dysarthria), CHF, Depression and Prostate ca, presenting with a chief complaint of left arm pain and tingling, headache, chest and stomach pain x 1 day. The patient was received awake, alert with confusion, ability follow simple directives with repetition, and decreased yes/no reliability. Decreased articulatory precision noted on sparse verbal output. Respiratory pattern appeared comfortable on room air. Findings and recommendations discussed with STEFAN Batista.    CXR 3/15/18 - Low lung volumes with resultant vascular crowding. No focal parenchymal opacity.

## 2018-03-16 NOTE — SWALLOW BEDSIDE ASSESSMENT ADULT - SWALLOW EVAL: DIAGNOSIS
The patient presents with oropharyngeal dysphagia characterized prolonged mastication, decreased bolus collection and slow ap transport with mild lingual residue for regular solids. Oral holding behaviors exhibited with thin and nectar-thick liquids. Functional oral management noted for puree and mechanical soft. The pharyngeal stage is characterized by a suspected delay in trigger with palpable hyolaryngeal excursion and immediate change in vocal quality to "wet tone" after thin liquids swallows suggesting laryngeal penetration vs aspiration. No overt clinical s/s noted for puree, mech soft, soft solids or nectar-thick liquids. The patient presents with oropharyngeal dysphagia characterized prolonged mastication, decreased bolus collection and slow ap transport with mild lingual residue for soft solids. Oral holding behaviors exhibited with thin and nectar-thick liquids. Functional oral management noted for puree and mechanical soft. The pharyngeal stage is characterized by a suspected delay in trigger with palpable hyolaryngeal excursion and immediate change in vocal quality to "wet tone" after thin liquids swallows suggesting laryngeal penetration vs aspiration. No overt clinical s/s noted for puree, mech soft, soft solids or nectar-thick liquids.

## 2018-03-16 NOTE — PROGRESS NOTE ADULT - SUBJECTIVE AND OBJECTIVE BOX
Patient is a 72y old  Male who presents with a chief complaint of left arm pain and tingling, headache, chest and stomach pain x 1 day (15 Mar 2018 10:02)      SUBJECTIVE / OVERNIGHT EVENTS: Pt was seen and examined at 11am, no overnight events, pt is confused denies any pain at this time, no issues reported by nsg.    MEDICATIONS  (STANDING):  amiodarone    Tablet 400 milliGRAM(s) Oral daily  apixaban 2.5 milliGRAM(s) Oral every 12 hours  atorvastatin 20 milliGRAM(s) Oral at bedtime  carvedilol 6.25 milliGRAM(s) Oral every 12 hours  cefTRIAXone   IVPB 1 Gram(s) IV Intermittent every 24 hours  famotidine    Tablet 20 milliGRAM(s) Oral daily  furosemide    Tablet 40 milliGRAM(s) Oral daily  latanoprost 0.005% Ophthalmic Solution 1 Drop(s) Both EYES at bedtime  lisinopril 10 milliGRAM(s) Oral daily  metolazone 2.5 milliGRAM(s) Oral <User Schedule>  potassium chloride    Tablet ER 10 milliEquivalent(s) Oral daily  tamsulosin 0.4 milliGRAM(s) Oral at bedtime    MEDICATIONS  (PRN):  mirtazapine 45 milliGRAM(s) Oral at bedtime PRN insomnia      Vital Signs Last 24 Hrs  T(C): 36.9 (16 Mar 2018 05:36), Max: 36.9 (16 Mar 2018 05:36)  T(F): 98.4 (16 Mar 2018 05:36), Max: 98.4 (16 Mar 2018 05:36)  HR: 64 (16 Mar 2018 09:31) (62 - 67)  BP: 94/61 (16 Mar 2018 09:31) (90/60 - 121/83)  BP(mean): --  RR: 18 (16 Mar 2018 05:36) (16 - 20)  SpO2: 100% (16 Mar 2018 05:36) (100% - 100%)  CAPILLARY BLOOD GLUCOSE        I&O's Summary    16 Mar 2018 07:01  -  16 Mar 2018 12:12  --------------------------------------------------------  IN: 118 mL / OUT: 0 mL / NET: 118 mL        PHYSICAL EXAM:  GENERAL: NAD, well-developed  CHEST/LUNG: Clear to auscultation bilaterally; No wheeze  HEART: Regular rate and rhythm  ABDOMEN: Soft, Nontender, Nondistended  EXTREMITIES: b/l LE 1+edema  PSYCH: calm now  NEUROLOGY: alert awake oriented to self only  SKIN: No rashes or lesions    LABS:                        13.1   6.75  )-----------( 186      ( 16 Mar 2018 05:40 )             38.8     03-16    138  |  100  |  36<H>  ----------------------------<  87  5.1   |  22  |  1.69<H>    Ca    9.0      16 Mar 2018 05:40  Phos  3.1     -16  Mg     2.6     -16    TPro  7.1  /  Alb  3.5  /  TBili  0.9  /  DBili  x   /  AST  26  /  ALT  17  /  AlkPhos  144<H>  03-16    PT/INR - ( 14 Mar 2018 23:49 )   PT: 14.2 SEC;   INR: 1.27          PTT - ( 14 Mar 2018 23:49 )  PTT:39.8 SEC  CARDIAC MARKERS ( 16 Mar 2018 05:40 )  x     / 0.13 ng/mL / x     / x     / x      CARDIAC MARKERS ( 15 Mar 2018 06:30 )  x     / 0.13 ng/mL / 96 u/L / 2.79 ng/mL / x      CARDIAC MARKERS ( 14 Mar 2018 23:49 )  x     / 0.15 ng/mL / 107 u/L / 2.68 ng/mL / x          Urinalysis Basic - ( 15 Mar 2018 03:30 )    Color: YELLOW / Appearance: HAZY / S.017 / pH: 6.0  Gluc: NEGATIVE / Ketone: NEGATIVE  / Bili: NEGATIVE / Urobili: 2 mg/dL   Blood: MODERATE / Protein: 100 mg/dL / Nitrite: NEGATIVE   Leuk Esterase: LARGE / RBC: 25-50 / WBC >50   Sq Epi: OCC / Non Sq Epi: x / Bacteria: FEW        RADIOLOGY & ADDITIONAL TESTS:    Imaging Personally Reviewed:    Consultant(s) Notes Reviewed:      Care Discussed with Consultants/Other Providers:

## 2018-03-16 NOTE — SWALLOW BEDSIDE ASSESSMENT ADULT - ORAL PHASE
Within functional limits Delayed oral transit time/Decreased anterior-posterior movement of the bolus Delayed AP transport

## 2018-03-17 LAB
-  AMIKACIN: SIGNIFICANT CHANGE UP
-  AMPICILLIN/SULBACTAM: SIGNIFICANT CHANGE UP
-  AMPICILLIN: SIGNIFICANT CHANGE UP
-  AZTREONAM: SIGNIFICANT CHANGE UP
-  CEFAZOLIN: SIGNIFICANT CHANGE UP
-  CEFEPIME: SIGNIFICANT CHANGE UP
-  CEFOXITIN: SIGNIFICANT CHANGE UP
-  CEFTAZIDIME: SIGNIFICANT CHANGE UP
-  CEFTRIAXONE: SIGNIFICANT CHANGE UP
-  CIPROFLOXACIN: SIGNIFICANT CHANGE UP
-  ERTAPENEM: SIGNIFICANT CHANGE UP
-  GENTAMICIN: SIGNIFICANT CHANGE UP
-  IMIPENEM: SIGNIFICANT CHANGE UP
-  LEVOFLOXACIN: SIGNIFICANT CHANGE UP
-  MEROPENEM: SIGNIFICANT CHANGE UP
-  NITROFURANTOIN: SIGNIFICANT CHANGE UP
-  PIPERACILLIN/TAZOBACTAM: SIGNIFICANT CHANGE UP
-  TIGECYCLINE: SIGNIFICANT CHANGE UP
-  TOBRAMYCIN: SIGNIFICANT CHANGE UP
-  TRIMETHOPRIM/SULFAMETHOXAZOLE: SIGNIFICANT CHANGE UP
BACTERIA UR CULT: SIGNIFICANT CHANGE UP
BASOPHILS # BLD AUTO: 0.05 K/UL — SIGNIFICANT CHANGE UP (ref 0–0.2)
BASOPHILS NFR BLD AUTO: 0.7 % — SIGNIFICANT CHANGE UP (ref 0–2)
BUN SERPL-MCNC: 33 MG/DL — HIGH (ref 7–23)
CALCIUM SERPL-MCNC: 8.3 MG/DL — LOW (ref 8.4–10.5)
CHLORIDE SERPL-SCNC: 99 MMOL/L — SIGNIFICANT CHANGE UP (ref 98–107)
CO2 SERPL-SCNC: 24 MMOL/L — SIGNIFICANT CHANGE UP (ref 22–31)
CREAT SERPL-MCNC: 1.34 MG/DL — HIGH (ref 0.5–1.3)
EOSINOPHIL # BLD AUTO: 0.07 K/UL — SIGNIFICANT CHANGE UP (ref 0–0.5)
EOSINOPHIL NFR BLD AUTO: 1 % — SIGNIFICANT CHANGE UP (ref 0–6)
GLUCOSE SERPL-MCNC: 70 MG/DL — SIGNIFICANT CHANGE UP (ref 70–99)
HCT VFR BLD CALC: 31.2 % — LOW (ref 39–50)
HGB BLD-MCNC: 10.2 G/DL — LOW (ref 13–17)
IMM GRANULOCYTES # BLD AUTO: 0.04 # — SIGNIFICANT CHANGE UP
IMM GRANULOCYTES NFR BLD AUTO: 0.6 % — SIGNIFICANT CHANGE UP (ref 0–1.5)
LYMPHOCYTES # BLD AUTO: 2.12 K/UL — SIGNIFICANT CHANGE UP (ref 1–3.3)
LYMPHOCYTES # BLD AUTO: 29.3 % — SIGNIFICANT CHANGE UP (ref 13–44)
MCHC RBC-ENTMCNC: 32.7 % — SIGNIFICANT CHANGE UP (ref 32–36)
MCHC RBC-ENTMCNC: 33.7 PG — SIGNIFICANT CHANGE UP (ref 27–34)
MCV RBC AUTO: 103 FL — HIGH (ref 80–100)
METHOD TYPE: SIGNIFICANT CHANGE UP
MONOCYTES # BLD AUTO: 0.95 K/UL — HIGH (ref 0–0.9)
MONOCYTES NFR BLD AUTO: 13.1 % — SIGNIFICANT CHANGE UP (ref 2–14)
NEUTROPHILS # BLD AUTO: 4.01 K/UL — SIGNIFICANT CHANGE UP (ref 1.8–7.4)
NEUTROPHILS NFR BLD AUTO: 55.3 % — SIGNIFICANT CHANGE UP (ref 43–77)
NRBC # FLD: 0.03 — SIGNIFICANT CHANGE UP
ORGANISM # SPEC MICROSCOPIC CNT: SIGNIFICANT CHANGE UP
ORGANISM # SPEC MICROSCOPIC CNT: SIGNIFICANT CHANGE UP
PLATELET # BLD AUTO: 190 K/UL — SIGNIFICANT CHANGE UP (ref 150–400)
PMV BLD: 11.3 FL — SIGNIFICANT CHANGE UP (ref 7–13)
POTASSIUM SERPL-MCNC: 3.7 MMOL/L — SIGNIFICANT CHANGE UP (ref 3.5–5.3)
POTASSIUM SERPL-SCNC: 3.7 MMOL/L — SIGNIFICANT CHANGE UP (ref 3.5–5.3)
RBC # BLD: 3.03 M/UL — LOW (ref 4.2–5.8)
RBC # FLD: 17.2 % — HIGH (ref 10.3–14.5)
SODIUM SERPL-SCNC: 138 MMOL/L — SIGNIFICANT CHANGE UP (ref 135–145)
WBC # BLD: 7.24 K/UL — SIGNIFICANT CHANGE UP (ref 3.8–10.5)
WBC # FLD AUTO: 7.24 K/UL — SIGNIFICANT CHANGE UP (ref 3.8–10.5)

## 2018-03-17 PROCEDURE — 99233 SBSQ HOSP IP/OBS HIGH 50: CPT

## 2018-03-17 RX ADMIN — ATORVASTATIN CALCIUM 20 MILLIGRAM(S): 80 TABLET, FILM COATED ORAL at 21:44

## 2018-03-17 RX ADMIN — Medication 10 MILLIEQUIVALENT(S): at 18:22

## 2018-03-17 RX ADMIN — AMIODARONE HYDROCHLORIDE 400 MILLIGRAM(S): 400 TABLET ORAL at 06:01

## 2018-03-17 RX ADMIN — FAMOTIDINE 20 MILLIGRAM(S): 10 INJECTION INTRAVENOUS at 18:22

## 2018-03-17 RX ADMIN — CARVEDILOL PHOSPHATE 6.25 MILLIGRAM(S): 80 CAPSULE, EXTENDED RELEASE ORAL at 06:01

## 2018-03-17 RX ADMIN — CEFTRIAXONE 100 GRAM(S): 500 INJECTION, POWDER, FOR SOLUTION INTRAMUSCULAR; INTRAVENOUS at 06:11

## 2018-03-17 RX ADMIN — Medication 40 MILLIGRAM(S): at 06:01

## 2018-03-17 RX ADMIN — LATANOPROST 1 DROP(S): 0.05 SOLUTION/ DROPS OPHTHALMIC; TOPICAL at 21:44

## 2018-03-17 RX ADMIN — APIXABAN 2.5 MILLIGRAM(S): 2.5 TABLET, FILM COATED ORAL at 06:01

## 2018-03-17 RX ADMIN — APIXABAN 2.5 MILLIGRAM(S): 2.5 TABLET, FILM COATED ORAL at 18:22

## 2018-03-17 RX ADMIN — TAMSULOSIN HYDROCHLORIDE 0.4 MILLIGRAM(S): 0.4 CAPSULE ORAL at 21:44

## 2018-03-17 NOTE — PROGRESS NOTE ADULT - PROBLEM SELECTOR PLAN 4
Patient with hx of CHF and current BNP of 7000+ however right now patient without JVD, no leg edema, no rales. Would monitor patient closely . Check Echocardiogram. Strict I's & O's, daily weights, continue furosemide, kcl, metolazone, coreg, lisinopril

## 2018-03-17 NOTE — DIETITIAN INITIAL EVALUATION ADULT. - PERTINENT LABORATORY DATA
H&H 10.2/31.2 (L), BUN 33 (H), creatinine 1.34 (H), calcium 8.3 (L), 3/16/2018 alkaline phosphatase 144 (H), HgbA1C 6.6% (H), 3/15/2018 HDL 34 (L), Lactate 2.1 (H)

## 2018-03-17 NOTE — DIETITIAN INITIAL EVALUATION ADULT. - PROBLEM SELECTOR PLAN 3
Patient with hx of Stage 3 CKD. Currently with BRYCE, GFR 36%, Monitor BUN/ creat, consider holding ACEI if creatinine worsens. Would

## 2018-03-17 NOTE — DIETITIAN INITIAL EVALUATION ADULT. - NS AS NUTRI INTERV MEDICAL AND FOOD SUPPLEMENTS
Commercial beverage/6) If intake remains low, consider adding Ensure Pudding 2x/ day (240 calories and 8 grams of protein).

## 2018-03-17 NOTE — PROGRESS NOTE ADULT - SUBJECTIVE AND OBJECTIVE BOX
Patient is a 72y old  Male who presents with a chief complaint of left arm pain and tingling, headache, chest and stomach pain x 1 day (15 Mar 2018 10:02)      SUBJECTIVE / OVERNIGHT EVENTS: Pt was seen and examined at 950am, no overnight events, pt is confused denies any pain at this time, no issues reported by nsg.        MEDICATIONS  (STANDING):  amiodarone    Tablet 400 milliGRAM(s) Oral daily  apixaban 2.5 milliGRAM(s) Oral every 12 hours  atorvastatin 20 milliGRAM(s) Oral at bedtime  carvedilol 6.25 milliGRAM(s) Oral every 12 hours  cefTRIAXone   IVPB 1 Gram(s) IV Intermittent every 24 hours  famotidine    Tablet 20 milliGRAM(s) Oral daily  furosemide    Tablet 40 milliGRAM(s) Oral daily  latanoprost 0.005% Ophthalmic Solution 1 Drop(s) Both EYES at bedtime  lisinopril 10 milliGRAM(s) Oral daily  metolazone 2.5 milliGRAM(s) Oral <User Schedule>  potassium chloride    Tablet ER 10 milliEquivalent(s) Oral daily  tamsulosin 0.4 milliGRAM(s) Oral at bedtime    MEDICATIONS  (PRN):  mirtazapine 45 milliGRAM(s) Oral at bedtime PRN insomnia      Vital Signs Last 24 Hrs  T(C): 36.8 (17 Mar 2018 14:56), Max: 36.8 (17 Mar 2018 14:56)  T(F): 98.3 (17 Mar 2018 14:56), Max: 98.3 (17 Mar 2018 14:56)  HR: 58 (17 Mar 2018 14:56) (57 - 68)  BP: 98/62 (17 Mar 2018 14:56) (95/65 - 110/78)  BP(mean): 74 (17 Mar 2018 05:58) (74 - 74)  RR: 20 (17 Mar 2018 14:56) (18 - 20)  SpO2: 100% (17 Mar 2018 14:56) (100% - 100%)  CAPILLARY BLOOD GLUCOSE        I&O's Summary    16 Mar 2018 07:01  -  17 Mar 2018 07:00  --------------------------------------------------------  IN: 1760 mL / OUT: 0 mL / NET: 1760 mL        PHYSICAL EXAM:  GENERAL: NAD, well-developed  CHEST/LUNG: Clear to auscultation bilaterally; No wheeze  HEART: Regular rate and rhythm  ABDOMEN: Soft, Nontender, Nondistended  EXTREMITIES: b/l LE trace edema  PSYCH: calm now  NEUROLOGY: alert awake oriented to self only otherwise confused  SKIN: No rashes or lesions      LABS:                        10.2   7.24  )-----------( 190      ( 17 Mar 2018 05:39 )             31.2     03-17    138  |  99  |  33<H>  ----------------------------<  70  3.7   |  24  |  1.34<H>    Ca    8.3<L>      17 Mar 2018 05:39  Phos  3.1     03-16  Mg     2.6     03-16    TPro  7.1  /  Alb  3.5  /  TBili  0.9  /  DBili  x   /  AST  26  /  ALT  17  /  AlkPhos  144<H>  03-16      CARDIAC MARKERS ( 16 Mar 2018 05:40 )  x     / 0.13 ng/mL / x     / x     / x              RADIOLOGY & ADDITIONAL TESTS:    Imaging Personally Reviewed:    Consultant(s) Notes Reviewed:      Care Discussed with Consultants/Other Providers:

## 2018-03-17 NOTE — DIETITIAN INITIAL EVALUATION ADULT. - OTHER INFO
Patient seen for CHF consult.  History unable to be obtained.  Per RN, patient is eating 60-75% of his tray, is tolerating food and passing BM.  Per medical record now known food allergies.  Based on medical record and RN, current diet with texture per SLP is appropriate.  Educational handout from the AND on CHF was left with RN for patient's family.  Recommend to obtain patient history on follow-up to assess intake PTA.

## 2018-03-17 NOTE — PROGRESS NOTE ADULT - PROBLEM SELECTOR PLAN 1
Based on HPI, patient might have been having typical chest pain. Given the mildly elevated troponins and BNP of 7000+ there is concern for some cardiac etiology. Currently however patient appears stable. Cont Telemonitoring, Serial EKGs & CE's, Cards consult appreciated Cards consult appreciated troponin elevation likely due to demand ischemia in the setting of ckd, no further cardiac wkup needed per cards   Currently however patient appears stable. Cont Telemonitoring, Serial EKGs & CE'

## 2018-03-17 NOTE — DIETITIAN INITIAL EVALUATION ADULT. - PHYSICAL APPEARANCE
underweight/Patient appears lean and thin in the face, and arms while muscular do not have fat mass.

## 2018-03-17 NOTE — DIETITIAN INITIAL EVALUATION ADULT. - ENERGY NEEDS
Ht:  65.5", Wt: 139.1 pounds, ,n %%, BMI 22.8  Non pitting bilateral ankle and foot edema, Ehsan Score 17, skin intact.  Fluids per medical team.

## 2018-03-17 NOTE — DIETITIAN INITIAL EVALUATION ADULT. - PROBLEM SELECTOR PLAN 1
Based on HPI, patient might have been having typical chest pain. Given the mildly elevated troponins and BNP of 7000+ there is concern for some cardiac etiology. Currently however patient appears stable. Admit to Telemonitoring, Serial EKGs & CE's, check FLP, continue BB, statin, Consider Cardiology eval. F/U MD note

## 2018-03-17 NOTE — PROGRESS NOTE ADULT - PROBLEM SELECTOR PLAN 3
Patient with hx of Stage 3 CKD. Currently with BRYCE, GFR 36%, Monitor BUN/ creat, creatinine mildly elevated today, will consider holding ACEI if creatinine worsens, creatinine improving trend for now.

## 2018-03-17 NOTE — DIETITIAN INITIAL EVALUATION ADULT. - SOURCE
Patient refused to be interviewed.  Attempted three times to see patient.  Information obtained from RN and Medical Record.

## 2018-03-17 NOTE — DIETITIAN INITIAL EVALUATION ADULT. - NS FNS SUPPLEMENTS
If intake remains low, consider adding Ensure Pudding 2x/ day (240 calories and 8 grams of protein)./Ensure pudding®

## 2018-03-17 NOTE — DIETITIAN INITIAL EVALUATION ADULT. - NS AS NUTRI INTERV MEALS SNACK
1)  continue with DASH/TLC diet and SLP recommended texture of dysphagia 2 mechanical soft with nectar liquids, 2) monitor for adequate intake and encourage intake, 3) Monitor weights, labs, BM's, skin integrity, 4) reassess for malnutrition at follow-up./General/healthful diet

## 2018-03-17 NOTE — DIETITIAN INITIAL EVALUATION ADULT. - PROBLEM SELECTOR PLAN 4
Patient with hx of CHF and current BNP of 7000+ however right now patient without JVD, no leg edema, no rales. Would monitor patient closely . Check Echocardiogram. Strict I's & O's, daily weights, continue furosemide, kcl, metolazone, lisinopril

## 2018-03-18 DIAGNOSIS — D64.9 ANEMIA, UNSPECIFIED: ICD-10-CM

## 2018-03-18 DIAGNOSIS — E87.6 HYPOKALEMIA: ICD-10-CM

## 2018-03-18 LAB
BASOPHILS # BLD AUTO: 0.04 K/UL — SIGNIFICANT CHANGE UP (ref 0–0.2)
BASOPHILS NFR BLD AUTO: 0.5 % — SIGNIFICANT CHANGE UP (ref 0–2)
BUN SERPL-MCNC: 31 MG/DL — HIGH (ref 7–23)
CALCIUM SERPL-MCNC: 8.8 MG/DL — SIGNIFICANT CHANGE UP (ref 8.4–10.5)
CHLORIDE SERPL-SCNC: 97 MMOL/L — LOW (ref 98–107)
CO2 SERPL-SCNC: 28 MMOL/L — SIGNIFICANT CHANGE UP (ref 22–31)
CREAT SERPL-MCNC: 1.39 MG/DL — HIGH (ref 0.5–1.3)
EOSINOPHIL # BLD AUTO: 0.08 K/UL — SIGNIFICANT CHANGE UP (ref 0–0.5)
EOSINOPHIL NFR BLD AUTO: 1.1 % — SIGNIFICANT CHANGE UP (ref 0–6)
GLUCOSE SERPL-MCNC: 84 MG/DL — SIGNIFICANT CHANGE UP (ref 70–99)
HCT VFR BLD CALC: 34.3 % — LOW (ref 39–50)
HGB BLD-MCNC: 10.9 G/DL — LOW (ref 13–17)
IMM GRANULOCYTES # BLD AUTO: 0.05 # — SIGNIFICANT CHANGE UP
IMM GRANULOCYTES NFR BLD AUTO: 0.7 % — SIGNIFICANT CHANGE UP (ref 0–1.5)
LYMPHOCYTES # BLD AUTO: 1.83 K/UL — SIGNIFICANT CHANGE UP (ref 1–3.3)
LYMPHOCYTES # BLD AUTO: 24.9 % — SIGNIFICANT CHANGE UP (ref 13–44)
MCHC RBC-ENTMCNC: 31.8 % — LOW (ref 32–36)
MCHC RBC-ENTMCNC: 32.9 PG — SIGNIFICANT CHANGE UP (ref 27–34)
MCV RBC AUTO: 103.6 FL — HIGH (ref 80–100)
MONOCYTES # BLD AUTO: 0.83 K/UL — SIGNIFICANT CHANGE UP (ref 0–0.9)
MONOCYTES NFR BLD AUTO: 11.3 % — SIGNIFICANT CHANGE UP (ref 2–14)
NEUTROPHILS # BLD AUTO: 4.51 K/UL — SIGNIFICANT CHANGE UP (ref 1.8–7.4)
NEUTROPHILS NFR BLD AUTO: 61.5 % — SIGNIFICANT CHANGE UP (ref 43–77)
NRBC # FLD: 0.04 — SIGNIFICANT CHANGE UP
PLATELET # BLD AUTO: 204 K/UL — SIGNIFICANT CHANGE UP (ref 150–400)
PMV BLD: 11 FL — SIGNIFICANT CHANGE UP (ref 7–13)
POTASSIUM SERPL-MCNC: 3.2 MMOL/L — LOW (ref 3.5–5.3)
POTASSIUM SERPL-SCNC: 3.2 MMOL/L — LOW (ref 3.5–5.3)
RBC # BLD: 3.31 M/UL — LOW (ref 4.2–5.8)
RBC # FLD: 17.3 % — HIGH (ref 10.3–14.5)
SODIUM SERPL-SCNC: 138 MMOL/L — SIGNIFICANT CHANGE UP (ref 135–145)
WBC # BLD: 7.34 K/UL — SIGNIFICANT CHANGE UP (ref 3.8–10.5)
WBC # FLD AUTO: 7.34 K/UL — SIGNIFICANT CHANGE UP (ref 3.8–10.5)

## 2018-03-18 PROCEDURE — 99233 SBSQ HOSP IP/OBS HIGH 50: CPT

## 2018-03-18 RX ORDER — POTASSIUM CHLORIDE 20 MEQ
40 PACKET (EA) ORAL ONCE
Qty: 0 | Refills: 0 | Status: COMPLETED | OUTPATIENT
Start: 2018-03-18 | End: 2018-03-18

## 2018-03-18 RX ORDER — CEFTRIAXONE 500 MG/1
1 INJECTION, POWDER, FOR SOLUTION INTRAMUSCULAR; INTRAVENOUS EVERY 24 HOURS
Qty: 0 | Refills: 0 | Status: DISCONTINUED | OUTPATIENT
Start: 2018-03-18 | End: 2018-03-19

## 2018-03-18 RX ADMIN — ATORVASTATIN CALCIUM 20 MILLIGRAM(S): 80 TABLET, FILM COATED ORAL at 21:42

## 2018-03-18 RX ADMIN — FAMOTIDINE 20 MILLIGRAM(S): 10 INJECTION INTRAVENOUS at 12:09

## 2018-03-18 RX ADMIN — AMIODARONE HYDROCHLORIDE 400 MILLIGRAM(S): 400 TABLET ORAL at 05:44

## 2018-03-18 RX ADMIN — LISINOPRIL 10 MILLIGRAM(S): 2.5 TABLET ORAL at 05:43

## 2018-03-18 RX ADMIN — APIXABAN 2.5 MILLIGRAM(S): 2.5 TABLET, FILM COATED ORAL at 17:09

## 2018-03-18 RX ADMIN — CEFTRIAXONE 100 GRAM(S): 500 INJECTION, POWDER, FOR SOLUTION INTRAMUSCULAR; INTRAVENOUS at 12:09

## 2018-03-18 RX ADMIN — CARVEDILOL PHOSPHATE 6.25 MILLIGRAM(S): 80 CAPSULE, EXTENDED RELEASE ORAL at 05:43

## 2018-03-18 RX ADMIN — Medication 40 MILLIEQUIVALENT(S): at 12:09

## 2018-03-18 RX ADMIN — Medication 10 MILLIEQUIVALENT(S): at 12:09

## 2018-03-18 RX ADMIN — Medication 40 MILLIGRAM(S): at 05:44

## 2018-03-18 RX ADMIN — TAMSULOSIN HYDROCHLORIDE 0.4 MILLIGRAM(S): 0.4 CAPSULE ORAL at 21:42

## 2018-03-18 RX ADMIN — CEFTRIAXONE 100 GRAM(S): 500 INJECTION, POWDER, FOR SOLUTION INTRAMUSCULAR; INTRAVENOUS at 05:44

## 2018-03-18 RX ADMIN — APIXABAN 2.5 MILLIGRAM(S): 2.5 TABLET, FILM COATED ORAL at 05:43

## 2018-03-18 RX ADMIN — LATANOPROST 1 DROP(S): 0.05 SOLUTION/ DROPS OPHTHALMIC; TOPICAL at 21:43

## 2018-03-18 NOTE — PROGRESS NOTE ADULT - PROBLEM SELECTOR PLAN 2
Patient with (+) UA. Continue ceftriaxone,  Urine culture with enterobacter sens to ceftriaxone cont current abx, ( pt on amiodarone so will not change to cipro for now)

## 2018-03-18 NOTE — PROGRESS NOTE ADULT - PROBLEM SELECTOR PLAN 1
Cards consult appreciated troponin elevation likely due to demand ischemia in the setting of ckd, no further cardiac wkup needed per cards   Currently however patient appears stable. Cont Telemonitoring, Serial EKGs & CE'

## 2018-03-18 NOTE — PROGRESS NOTE ADULT - SUBJECTIVE AND OBJECTIVE BOX
Patient is a 72y old  Male who presents with a chief complaint of left arm pain and tingling, headache, chest and stomach pain x 1 day (15 Mar 2018 10:02)      SUBJECTIVE / OVERNIGHT EVENTS: Pt was seen and examined at 1005am, no overnight events, pt is confused unable to get any history, no issues reported by nsg.        MEDICATIONS  (STANDING):  amiodarone    Tablet 400 milliGRAM(s) Oral daily  apixaban 2.5 milliGRAM(s) Oral every 12 hours  atorvastatin 20 milliGRAM(s) Oral at bedtime  carvedilol 6.25 milliGRAM(s) Oral every 12 hours  cefTRIAXone   IVPB 1 Gram(s) IV Intermittent every 24 hours  famotidine    Tablet 20 milliGRAM(s) Oral daily  furosemide    Tablet 40 milliGRAM(s) Oral daily  latanoprost 0.005% Ophthalmic Solution 1 Drop(s) Both EYES at bedtime  lisinopril 10 milliGRAM(s) Oral daily  metolazone 2.5 milliGRAM(s) Oral <User Schedule>  potassium chloride    Tablet ER 10 milliEquivalent(s) Oral daily  tamsulosin 0.4 milliGRAM(s) Oral at bedtime    MEDICATIONS  (PRN):  mirtazapine 45 milliGRAM(s) Oral at bedtime PRN insomnia      Vital Signs Last 24 Hrs  T(C): 36.3 (18 Mar 2018 13:02), Max: 36.9 (17 Mar 2018 20:50)  T(F): 97.4 (18 Mar 2018 13:02), Max: 98.4 (17 Mar 2018 20:50)  HR: 74 (18 Mar 2018 13:02) (58 - 79)  BP: 96/63 (18 Mar 2018 13:02) (90/55 - 104/67)  BP(mean): --  RR: 18 (18 Mar 2018 13:02) (18 - 20)  SpO2: 100% (18 Mar 2018 13:02) (99% - 100%)  CAPILLARY BLOOD GLUCOSE        I&O's Summary      PHYSICAL EXAM:  GENERAL: NAD, well-developed  CHEST/LUNG: Clear to auscultation bilaterally; No wheeze  HEART: Regular rate and rhythm  ABDOMEN: Soft, Nontender, Nondistended  EXTREMITIES: b/l LE trace edema  PSYCH: calm now  NEUROLOGY: alert awake oriented to self only otherwise confused  SKIN: No rashes or lesions        LABS:                        10.9   7.34  )-----------( 204      ( 18 Mar 2018 06:53 )             34.3     03-18    138  |  97<L>  |  31<H>  ----------------------------<  84  3.2<L>   |  28  |  1.39<H>    Ca    8.8      18 Mar 2018 06:53                RADIOLOGY & ADDITIONAL TESTS:    Imaging Personally Reviewed:    Consultant(s) Notes Reviewed:      Care Discussed with Consultants/Other Providers:

## 2018-03-19 ENCOUNTER — TRANSCRIPTION ENCOUNTER (OUTPATIENT)
Age: 73
End: 2018-03-19

## 2018-03-19 LAB
BASOPHILS # BLD AUTO: 0.04 K/UL — SIGNIFICANT CHANGE UP (ref 0–0.2)
BASOPHILS NFR BLD AUTO: 0.5 % — SIGNIFICANT CHANGE UP (ref 0–2)
BUN SERPL-MCNC: 33 MG/DL — HIGH (ref 7–23)
CALCIUM SERPL-MCNC: 8.9 MG/DL — SIGNIFICANT CHANGE UP (ref 8.4–10.5)
CHLORIDE SERPL-SCNC: 99 MMOL/L — SIGNIFICANT CHANGE UP (ref 98–107)
CO2 SERPL-SCNC: 25 MMOL/L — SIGNIFICANT CHANGE UP (ref 22–31)
CREAT SERPL-MCNC: 1.52 MG/DL — HIGH (ref 0.5–1.3)
EOSINOPHIL # BLD AUTO: 0.07 K/UL — SIGNIFICANT CHANGE UP (ref 0–0.5)
EOSINOPHIL NFR BLD AUTO: 0.9 % — SIGNIFICANT CHANGE UP (ref 0–6)
FERRITIN SERPL-MCNC: 125 NG/ML — SIGNIFICANT CHANGE UP (ref 30–400)
FOLATE SERPL-MCNC: 11.9 NG/ML — SIGNIFICANT CHANGE UP (ref 4.7–20)
GLUCOSE SERPL-MCNC: 84 MG/DL — SIGNIFICANT CHANGE UP (ref 70–99)
HCT VFR BLD CALC: 37.3 % — LOW (ref 39–50)
HGB BLD-MCNC: 12 G/DL — LOW (ref 13–17)
IMM GRANULOCYTES # BLD AUTO: 0.06 # — SIGNIFICANT CHANGE UP
IMM GRANULOCYTES NFR BLD AUTO: 0.7 % — SIGNIFICANT CHANGE UP (ref 0–1.5)
IRON SATN MFR SERPL: 347 UG/DL — SIGNIFICANT CHANGE UP (ref 155–535)
IRON SATN MFR SERPL: 50 UG/DL — SIGNIFICANT CHANGE UP (ref 45–165)
LYMPHOCYTES # BLD AUTO: 1.91 K/UL — SIGNIFICANT CHANGE UP (ref 1–3.3)
LYMPHOCYTES # BLD AUTO: 23.2 % — SIGNIFICANT CHANGE UP (ref 13–44)
MCHC RBC-ENTMCNC: 32.2 % — SIGNIFICANT CHANGE UP (ref 32–36)
MCHC RBC-ENTMCNC: 33 PG — SIGNIFICANT CHANGE UP (ref 27–34)
MCV RBC AUTO: 102.5 FL — HIGH (ref 80–100)
MONOCYTES # BLD AUTO: 0.85 K/UL — SIGNIFICANT CHANGE UP (ref 0–0.9)
MONOCYTES NFR BLD AUTO: 10.3 % — SIGNIFICANT CHANGE UP (ref 2–14)
NEUTROPHILS # BLD AUTO: 5.29 K/UL — SIGNIFICANT CHANGE UP (ref 1.8–7.4)
NEUTROPHILS NFR BLD AUTO: 64.4 % — SIGNIFICANT CHANGE UP (ref 43–77)
NRBC # FLD: 0.03 — SIGNIFICANT CHANGE UP
PLATELET # BLD AUTO: 196 K/UL — SIGNIFICANT CHANGE UP (ref 150–400)
PMV BLD: 11.3 FL — SIGNIFICANT CHANGE UP (ref 7–13)
POTASSIUM SERPL-MCNC: 4.6 MMOL/L — SIGNIFICANT CHANGE UP (ref 3.5–5.3)
POTASSIUM SERPL-SCNC: 4.6 MMOL/L — SIGNIFICANT CHANGE UP (ref 3.5–5.3)
RBC # BLD: 3.64 M/UL — LOW (ref 4.2–5.8)
RBC # FLD: 17.6 % — HIGH (ref 10.3–14.5)
SODIUM SERPL-SCNC: 138 MMOL/L — SIGNIFICANT CHANGE UP (ref 135–145)
UIBC SERPL-MCNC: 297 UG/DL — SIGNIFICANT CHANGE UP (ref 110–370)
VIT B12 SERPL-MCNC: 825 PG/ML — SIGNIFICANT CHANGE UP (ref 200–900)
WBC # BLD: 8.22 K/UL — SIGNIFICANT CHANGE UP (ref 3.8–10.5)
WBC # FLD AUTO: 8.22 K/UL — SIGNIFICANT CHANGE UP (ref 3.8–10.5)

## 2018-03-19 PROCEDURE — 99233 SBSQ HOSP IP/OBS HIGH 50: CPT

## 2018-03-19 RX ORDER — MIDODRINE HYDROCHLORIDE 2.5 MG/1
5 TABLET ORAL ONCE
Qty: 0 | Refills: 0 | Status: COMPLETED | OUTPATIENT
Start: 2018-03-19 | End: 2018-03-19

## 2018-03-19 RX ORDER — CEFPODOXIME PROXETIL 100 MG
100 TABLET ORAL EVERY 12 HOURS
Qty: 0 | Refills: 0 | Status: DISCONTINUED | OUTPATIENT
Start: 2018-03-19 | End: 2018-03-20

## 2018-03-19 RX ADMIN — FAMOTIDINE 20 MILLIGRAM(S): 10 INJECTION INTRAVENOUS at 12:04

## 2018-03-19 RX ADMIN — ATORVASTATIN CALCIUM 20 MILLIGRAM(S): 80 TABLET, FILM COATED ORAL at 23:27

## 2018-03-19 RX ADMIN — Medication 100 MILLIGRAM(S): at 12:04

## 2018-03-19 RX ADMIN — Medication 40 MILLIGRAM(S): at 05:03

## 2018-03-19 RX ADMIN — Medication 100 MILLIGRAM(S): at 17:40

## 2018-03-19 RX ADMIN — AMIODARONE HYDROCHLORIDE 400 MILLIGRAM(S): 400 TABLET ORAL at 05:03

## 2018-03-19 RX ADMIN — APIXABAN 2.5 MILLIGRAM(S): 2.5 TABLET, FILM COATED ORAL at 05:03

## 2018-03-19 RX ADMIN — CARVEDILOL PHOSPHATE 6.25 MILLIGRAM(S): 80 CAPSULE, EXTENDED RELEASE ORAL at 05:02

## 2018-03-19 RX ADMIN — APIXABAN 2.5 MILLIGRAM(S): 2.5 TABLET, FILM COATED ORAL at 17:39

## 2018-03-19 RX ADMIN — Medication 10 MILLIEQUIVALENT(S): at 12:04

## 2018-03-19 RX ADMIN — MIDODRINE HYDROCHLORIDE 5 MILLIGRAM(S): 2.5 TABLET ORAL at 23:27

## 2018-03-19 RX ADMIN — LATANOPROST 1 DROP(S): 0.05 SOLUTION/ DROPS OPHTHALMIC; TOPICAL at 23:28

## 2018-03-19 RX ADMIN — LISINOPRIL 10 MILLIGRAM(S): 2.5 TABLET ORAL at 05:04

## 2018-03-19 NOTE — PROGRESS NOTE ADULT - PROBLEM SELECTOR PLAN 1
Patient with (+) UA. Continue ceftriaxone,  Urine culture with enterobacter sens to ceftriaxone cont current abx -->   ( pt on amiodarone so will not change to cipro for now) Patient with (+) UA. Continue ceftriaxone,  Urine culture with enterobacter sens to ceftriaxone --> change to PO Vantin  ( pt on amiodarone so will not change to cipro for now)

## 2018-03-19 NOTE — PROGRESS NOTE ADULT - SUBJECTIVE AND OBJECTIVE BOX
Patient is a 72y old  Male who presents with a chief complaint of left arm pain and tingling, headache, chest and stomach pain x 1 day (15 Mar 2018 10:02)  CC: UTI    SUBJECTIVE / OVERNIGHT EVENTS:  Pt lying with covers over his head, oriented to himself only. Not answering questions freely.  When asked "can you talk?" he said yes.      MEDICATIONS  (STANDING):  amiodarone    Tablet 400 milliGRAM(s) Oral daily  apixaban 2.5 milliGRAM(s) Oral every 12 hours  atorvastatin 20 milliGRAM(s) Oral at bedtime  carvedilol 6.25 milliGRAM(s) Oral every 12 hours  cefpodoxime 100 milliGRAM(s) Oral every 12 hours  famotidine    Tablet 20 milliGRAM(s) Oral daily  latanoprost 0.005% Ophthalmic Solution 1 Drop(s) Both EYES at bedtime  lisinopril 10 milliGRAM(s) Oral daily  metolazone 2.5 milliGRAM(s) Oral <User Schedule>  potassium chloride    Tablet ER 10 milliEquivalent(s) Oral daily  tamsulosin 0.4 milliGRAM(s) Oral at bedtime    MEDICATIONS  (PRN):  mirtazapine 45 milliGRAM(s) Oral at bedtime PRN insomnia    Vital Signs Last 24 Hrs  T(C): 36.4 (19 Mar 2018 14:54), Max: 36.7 (18 Mar 2018 21:28)  T(F): 97.6 (19 Mar 2018 14:54), Max: 98 (18 Mar 2018 21:28)  HR: 50 (19 Mar 2018 17:36) (50 - 61)  BP: 94/57 (19 Mar 2018 17:36) (94/57 - 110/62)  RR: 18 (19 Mar 2018 14:54) (18 - 18)  SpO2: 100% (19 Mar 2018 14:54) (100% - 100%)    PHYSICAL EXAM:  GENERAL: thin, frail, elderly BM, NAD, not answering ?s reliably  HEAD:  Atraumatic, Normocephalic  EYES: EOMI, PERRLA, conjunctiva and sclera clear  NECK: Supple, No JVD  CHEST/LUNG: Clear to auscultation bilaterally; No wheeze  HEART: Regular rate and rhythm; No murmurs, rubs, or gallops  ABDOMEN: Soft, Nontender, Nondistended; Bowel sounds present  EXTREMITIES:  2+ Peripheral Pulses, No clubbing, cyanosis, or edema  PSYCH: calm, oriented to self only  NEUROLOGY: non-focal  SKIN: No rashes or lesions    LABS:             12.0   8.22  )-----------( 196      ( 19 Mar 2018 04:50 )             37.3     138  |  99  |  33<H>  ----------------------------<  84  4.6   |  25  |  1.52<H>    Ca    8.9      19 Mar 2018 04:50  RADIOLOGY & ADDITIONAL TESTS:    Culture - Urine (03.15.18 @ 09:27)    -  Amikacin: S <=16 ED    -  Ampicillin: R    -  Ampicillin/Sulbactam: R <=8/4 ED    -  Aztreonam: S <=4 ED    -  Cefazolin: R >16 ED    -  Cefepime: S <=4 ED    -  Cefoxitin: R <=8 ED    -  Ceftazidime: S <=1 ED    -  Ceftriaxone: S <=1 ED    -  Ciprofloxacin: S <=1 ED    -  Ertapenem: S <=1 ED    -  Gentamicin: S <=4 ED    -  Imipenem: S <=1 DE    -  Levofloxacin: S <=2 ED    -  Meropenem: S <=1 ED    -  Nitrofurantoin: S <=32 ED    -  Piperacillin/Tazobactam: S <=16 ED    -  Tigecycline: S <=2 ED    -  Tobramycin: S <=4 ED    -  Trimethoprim/Sulfamethoxazole: S <=2/38 ED    Culture - Urine:   COLONY COUNT: > = 100,000 CFU/ML    Specimen Source: URINE CATHETER    Organism Identification: Enterobacter cloacae    Organism: Enterobacter cloacae    Method Type: MICROSCAN NEG URINE COMBO 61    Imaging Personally Reviewed:    Consultant(s) Notes Reviewed:      Care Discussed with Consultants/Other Providers: RN, SW, CM, ADS re overall care Patient is a 72y old  Male who presents with a chief complaint of left arm pain and tingling, headache, chest and stomach pain x 1 day (15 Mar 2018 10:02)  CC: UTI    SUBJECTIVE / OVERNIGHT EVENTS:  Tele: paced  Pt lying with covers over his head, oriented to himself only. Not answering questions freely.  When asked "can you talk?" he said yes.      MEDICATIONS  (STANDING):  amiodarone    Tablet 400 milliGRAM(s) Oral daily  apixaban 2.5 milliGRAM(s) Oral every 12 hours  atorvastatin 20 milliGRAM(s) Oral at bedtime  carvedilol 6.25 milliGRAM(s) Oral every 12 hours  cefpodoxime 100 milliGRAM(s) Oral every 12 hours  famotidine    Tablet 20 milliGRAM(s) Oral daily  latanoprost 0.005% Ophthalmic Solution 1 Drop(s) Both EYES at bedtime  lisinopril 10 milliGRAM(s) Oral daily  metolazone 2.5 milliGRAM(s) Oral <User Schedule>  potassium chloride    Tablet ER 10 milliEquivalent(s) Oral daily  tamsulosin 0.4 milliGRAM(s) Oral at bedtime    MEDICATIONS  (PRN):  mirtazapine 45 milliGRAM(s) Oral at bedtime PRN insomnia    Vital Signs Last 24 Hrs  T(C): 36.4 (19 Mar 2018 14:54), Max: 36.7 (18 Mar 2018 21:28)  T(F): 97.6 (19 Mar 2018 14:54), Max: 98 (18 Mar 2018 21:28)  HR: 50 (19 Mar 2018 17:36) (50 - 61)  BP: 94/57 (19 Mar 2018 17:36) (94/57 - 110/62)  RR: 18 (19 Mar 2018 14:54) (18 - 18)  SpO2: 100% (19 Mar 2018 14:54) (100% - 100%)    PHYSICAL EXAM:  GENERAL: thin, frail, elderly BM, NAD, not answering ?s reliably  HEAD:  Atraumatic, Normocephalic  EYES: EOMI, PERRLA, conjunctiva and sclera clear  NECK: Supple, No JVD  CHEST/LUNG: Clear to auscultation bilaterally; No wheeze  HEART: Regular rate and rhythm; No murmurs, rubs, or gallops  ABDOMEN: Soft, Nontender, Nondistended; Bowel sounds present  EXTREMITIES:  2+ Peripheral Pulses, No clubbing, cyanosis, or edema  PSYCH: calm, oriented to self only  NEUROLOGY: non-focal  SKIN: No rashes or lesions    LABS:             12.0   8.22  )-----------( 196      ( 19 Mar 2018 04:50 )             37.3     138  |  99  |  33<H>  ----------------------------<  84  4.6   |  25  |  1.52<H>    Ca    8.9      19 Mar 2018 04:50  RADIOLOGY & ADDITIONAL TESTS:    Culture - Urine (03.15.18 @ 09:27)    -  Amikacin: S <=16 ED    -  Ampicillin: R    -  Ampicillin/Sulbactam: R <=8/4 ED    -  Aztreonam: S <=4 ED    -  Cefazolin: R >16 ED    -  Cefepime: S <=4 ED    -  Cefoxitin: R <=8 ED    -  Ceftazidime: S <=1 ED    -  Ceftriaxone: S <=1 ED    -  Ciprofloxacin: S <=1 ED    -  Ertapenem: S <=1 ED    -  Gentamicin: S <=4 ED    -  Imipenem: S <=1 ED    -  Levofloxacin: S <=2 ED    -  Meropenem: S <=1 ED    -  Nitrofurantoin: S <=32 ED    -  Piperacillin/Tazobactam: S <=16 ED    -  Tigecycline: S <=2 ED    -  Tobramycin: S <=4 ED    -  Trimethoprim/Sulfamethoxazole: S <=2/38 ED    Culture - Urine:   COLONY COUNT: > = 100,000 CFU/ML    Specimen Source: URINE CATHETER    Organism Identification: Enterobacter cloacae    Organism: Enterobacter cloacae    Method Type: MICROSCAN NEG URINE COMBO 61    Imaging Personally Reviewed:    Consultant(s) Notes Reviewed:      Care Discussed with Consultants/Other Providers: RN, SW, CM, ADS re overall care

## 2018-03-19 NOTE — PROGRESS NOTE ADULT - PROBLEM SELECTOR PLAN 4
replete k Patient with hx of CHF and current BNP of 7000+ however right now patient without JVD, no leg edema, no rales. Would monitor patient closely . Check Echocardiogram. Strict I's & O's, daily weights, continue furosemide, kcl, metolazone, coreg, lisinopril

## 2018-03-19 NOTE — PROGRESS NOTE ADULT - ASSESSMENT
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

## 2018-03-19 NOTE — PROGRESS NOTE ADULT - PROBLEM SELECTOR PLAN 7
Patient with hx of CHF and current BNP of 7000+ however right now patient without JVD, no leg edema, no rales. Would monitor patient closely . Check Echocardiogram. Strict I's & O's, daily weights, continue furosemide, kcl, metolazone, coreg, lisinopril replete k

## 2018-03-19 NOTE — PROGRESS NOTE ADULT - PROBLEM SELECTOR PLAN 5
Patient with hx of Stage 3 CKD. Currently with BRYCE, GFR 36%, Monitor BUN/ creat, creatinine mildly elevated today, will consider holding ACEI if creatinine worsens, creatinine improving trend for now. [id number] score: 6 continue eliquis, amiodarone, coreg

## 2018-03-19 NOTE — PROGRESS NOTE ADULT - PROBLEM SELECTOR PLAN 6
check iron studies, b12 and folate Patient with hx of Stage 3 CKD. Currently with BRYCE, GFR 36%, Monitor BUN/ creat, creatinine stable

## 2018-03-19 NOTE — PROGRESS NOTE ADULT - PROBLEM SELECTOR PLAN 3
Cards consult appreciated troponin elevation likely due to demand ischemia in the setting of ckd, no further cardiac wkup needed per cards   Currently however patient appears stable. Cont Telemonitoring, Serial EKGs & CE' Cards consult appreciated troponin elevation likely due to demand ischemia in the setting of ckd, no further cardiac wkup needed per cards

## 2018-03-19 NOTE — PROGRESS NOTE ADULT - PROBLEM SELECTOR PLAN 2
continue eliquis & statin continue eliquis & statin  ?underlying cognitive impairment - d/w wife pt "confused" past few years since stroke

## 2018-03-20 VITALS
OXYGEN SATURATION: 100 % | DIASTOLIC BLOOD PRESSURE: 64 MMHG | TEMPERATURE: 98 F | RESPIRATION RATE: 18 BRPM | SYSTOLIC BLOOD PRESSURE: 100 MMHG | HEART RATE: 54 BPM

## 2018-03-20 LAB
BASOPHILS # BLD AUTO: 0.04 K/UL — SIGNIFICANT CHANGE UP (ref 0–0.2)
BASOPHILS NFR BLD AUTO: 0.6 % — SIGNIFICANT CHANGE UP (ref 0–2)
BUN SERPL-MCNC: 35 MG/DL — HIGH (ref 7–23)
CALCIUM SERPL-MCNC: 8.8 MG/DL — SIGNIFICANT CHANGE UP (ref 8.4–10.5)
CHLORIDE SERPL-SCNC: 103 MMOL/L — SIGNIFICANT CHANGE UP (ref 98–107)
CO2 SERPL-SCNC: 27 MMOL/L — SIGNIFICANT CHANGE UP (ref 22–31)
CREAT SERPL-MCNC: 1.56 MG/DL — HIGH (ref 0.5–1.3)
EOSINOPHIL # BLD AUTO: 0.07 K/UL — SIGNIFICANT CHANGE UP (ref 0–0.5)
EOSINOPHIL NFR BLD AUTO: 1 % — SIGNIFICANT CHANGE UP (ref 0–6)
GLUCOSE SERPL-MCNC: 84 MG/DL — SIGNIFICANT CHANGE UP (ref 70–99)
HCT VFR BLD CALC: 34.3 % — LOW (ref 39–50)
HGB BLD-MCNC: 11.5 G/DL — LOW (ref 13–17)
IMM GRANULOCYTES # BLD AUTO: 0.05 # — SIGNIFICANT CHANGE UP
IMM GRANULOCYTES NFR BLD AUTO: 0.7 % — SIGNIFICANT CHANGE UP (ref 0–1.5)
LYMPHOCYTES # BLD AUTO: 1.77 K/UL — SIGNIFICANT CHANGE UP (ref 1–3.3)
LYMPHOCYTES # BLD AUTO: 24.6 % — SIGNIFICANT CHANGE UP (ref 13–44)
MCHC RBC-ENTMCNC: 33.5 % — SIGNIFICANT CHANGE UP (ref 32–36)
MCHC RBC-ENTMCNC: 34.3 PG — HIGH (ref 27–34)
MCV RBC AUTO: 102.4 FL — HIGH (ref 80–100)
MONOCYTES # BLD AUTO: 0.74 K/UL — SIGNIFICANT CHANGE UP (ref 0–0.9)
MONOCYTES NFR BLD AUTO: 10.3 % — SIGNIFICANT CHANGE UP (ref 2–14)
NEUTROPHILS # BLD AUTO: 4.52 K/UL — SIGNIFICANT CHANGE UP (ref 1.8–7.4)
NEUTROPHILS NFR BLD AUTO: 62.8 % — SIGNIFICANT CHANGE UP (ref 43–77)
NRBC # FLD: 0.04 — SIGNIFICANT CHANGE UP
PLATELET # BLD AUTO: 206 K/UL — SIGNIFICANT CHANGE UP (ref 150–400)
PMV BLD: 10.9 FL — SIGNIFICANT CHANGE UP (ref 7–13)
POTASSIUM SERPL-MCNC: 3.8 MMOL/L — SIGNIFICANT CHANGE UP (ref 3.5–5.3)
POTASSIUM SERPL-SCNC: 3.8 MMOL/L — SIGNIFICANT CHANGE UP (ref 3.5–5.3)
RBC # BLD: 3.35 M/UL — LOW (ref 4.2–5.8)
RBC # FLD: 17.6 % — HIGH (ref 10.3–14.5)
SODIUM SERPL-SCNC: 143 MMOL/L — SIGNIFICANT CHANGE UP (ref 135–145)
WBC # BLD: 7.19 K/UL — SIGNIFICANT CHANGE UP (ref 3.8–10.5)
WBC # FLD AUTO: 7.19 K/UL — SIGNIFICANT CHANGE UP (ref 3.8–10.5)

## 2018-03-20 PROCEDURE — 99239 HOSP IP/OBS DSCHRG MGMT >30: CPT

## 2018-03-20 PROCEDURE — 99232 SBSQ HOSP IP/OBS MODERATE 35: CPT

## 2018-03-20 RX ORDER — LISINOPRIL 2.5 MG/1
1 TABLET ORAL
Qty: 0 | Refills: 0 | COMMUNITY
Start: 2018-03-20

## 2018-03-20 RX ORDER — LISINOPRIL 2.5 MG/1
1 TABLET ORAL
Qty: 0 | Refills: 0 | COMMUNITY

## 2018-03-20 RX ORDER — CEFPODOXIME PROXETIL 100 MG
1 TABLET ORAL
Qty: 0 | Refills: 0 | COMMUNITY
Start: 2018-03-20

## 2018-03-20 RX ORDER — TAMSULOSIN HYDROCHLORIDE 0.4 MG/1
1 CAPSULE ORAL
Qty: 0 | Refills: 0 | COMMUNITY
Start: 2018-03-20

## 2018-03-20 RX ORDER — CARVEDILOL PHOSPHATE 80 MG/1
3.12 CAPSULE, EXTENDED RELEASE ORAL EVERY 12 HOURS
Qty: 0 | Refills: 0 | Status: DISCONTINUED | OUTPATIENT
Start: 2018-03-20 | End: 2018-03-20

## 2018-03-20 RX ORDER — LISINOPRIL 2.5 MG/1
5 TABLET ORAL DAILY
Qty: 0 | Refills: 0 | Status: DISCONTINUED | OUTPATIENT
Start: 2018-03-20 | End: 2018-03-20

## 2018-03-20 RX ADMIN — AMIODARONE HYDROCHLORIDE 400 MILLIGRAM(S): 400 TABLET ORAL at 14:19

## 2018-03-20 RX ADMIN — FAMOTIDINE 20 MILLIGRAM(S): 10 INJECTION INTRAVENOUS at 14:20

## 2018-03-20 RX ADMIN — Medication 100 MILLIGRAM(S): at 06:49

## 2018-03-20 RX ADMIN — APIXABAN 2.5 MILLIGRAM(S): 2.5 TABLET, FILM COATED ORAL at 06:49

## 2018-03-20 RX ADMIN — Medication 10 MILLIEQUIVALENT(S): at 14:20

## 2018-03-20 NOTE — PROGRESS NOTE ADULT - PROBLEM SELECTOR PLAN 6
Patient with hx of Stage 3 CKD. Currently with BRYCE, GFR 36%, Monitor BUN/ creat, creatinine stable replete k

## 2018-03-20 NOTE — DISCHARGE NOTE ADULT - PLAN OF CARE
Continue antibiotic as prescribed. Please follow with PCP Dr. Eubanks within 1 week of discharge. Continue eliquis, amiodarone, coreg. Continue eliquis and statin Complete the course of abx F/U Cardiology as out pt- Lasix was decreased to 20 mg - Coreg/ Lisinopril was decreased   Will likely need to increase dose permissive of BP

## 2018-03-20 NOTE — DISCHARGE NOTE ADULT - COMMUNITY RESOURCES
[facility]  [phone] [place] [address] [phone]

## 2018-03-20 NOTE — DISCHARGE NOTE ADULT - MEDICATION SUMMARY - MEDICATIONS TO CHANGE
I will SWITCH the dose or number of times a day I take the medications listed below when I get home from the hospital:    carvedilol 6.25 mg oral tablet  -- 1 tab(s) by mouth every 12 hours    furosemide 40 mg oral tablet  -- 1 tab(s) by mouth once a day    lisinopril 10 mg oral tablet  -- 1 tab(s) by mouth once a day

## 2018-03-20 NOTE — DISCHARGE NOTE ADULT - CARE PROVIDERS DIRECT ADDRESSES
,DirectAddress_Unknown ,DirectAddress_Unknown,[email or web address]

## 2018-03-20 NOTE — PROGRESS NOTE ADULT - PROBLEM SELECTOR PROBLEM 2
Stroke
UTI (urinary tract infection)
Stroke

## 2018-03-20 NOTE — PROGRESS NOTE ADULT - SUBJECTIVE AND OBJECTIVE BOX
Patient is a 72y old  Male who presents with a chief complaint of left arm pain and tingling, headache, chest and stomach pain x 1 day (20 Mar 2018 03:10)    SUBJECTIVE / OVERNIGHT EVENTS:  Tele:    MEDICATIONS  (STANDING):  amiodarone    Tablet 400 milliGRAM(s) Oral daily  apixaban 2.5 milliGRAM(s) Oral every 12 hours  atorvastatin 20 milliGRAM(s) Oral at bedtime  carvedilol 3.125 milliGRAM(s) Oral every 12 hours  cefpodoxime 100 milliGRAM(s) Oral every 12 hours  famotidine    Tablet 20 milliGRAM(s) Oral daily  latanoprost 0.005% Ophthalmic Solution 1 Drop(s) Both EYES at bedtime  lisinopril 5 milliGRAM(s) Oral daily  potassium chloride    Tablet ER 10 milliEquivalent(s) Oral daily  tamsulosin 0.4 milliGRAM(s) Oral at bedtime    MEDICATIONS  (PRN):  mirtazapine 45 milliGRAM(s) Oral at bedtime PRN insomnia    Vital Signs Last 24 Hrs  T(C): 37 (20 Mar 2018 06:11), Max: 37 (19 Mar 2018 20:48)  T(F): 98.6 (20 Mar 2018 06:11), Max: 98.6 (19 Mar 2018 20:48)  HR: 55 (20 Mar 2018 06:11) (50 - 60)  BP: 104/68 (20 Mar 2018 06:11) (84/62 - 104/68)  RR: 17 (20 Mar 2018 06:11) (17 - 18)  SpO2: 100% (20 Mar 2018 06:11) (100% - 100%)    PHYSICAL EXAM:  GENERAL: NAD, well-developed  HEAD:  Atraumatic, Normocephalic  EYES: EOMI, PERRLA, conjunctiva and sclera clear  NECK: Supple, No JVD  CHEST/LUNG: Clear to auscultation bilaterally; No wheeze  HEART: Regular rate and rhythm; No murmurs, rubs, or gallops  ABDOMEN: Soft, Nontender, Nondistended; Bowel sounds present  EXTREMITIES:  2+ Peripheral Pulses, No clubbing, cyanosis, or edema  PSYCH: AAOx3  NEUROLOGY: non-focal  SKIN: No rashes or lesions    LABS:                        11.5   7.19  )-----------( 206      ( 20 Mar 2018 05:37 )             34.3     03-20    143  |  103  |  35<H>  ----------------------------<  84  3.8   |  27  |  1.56<H>    Ca    8.8      20 Mar 2018 05:37  RADIOLOGY & ADDITIONAL TESTS:    Imaging Personally Reviewed:    Consultant(s) Notes Reviewed:      Care Discussed with Consultants/Other Providers: RN, SW, CM, ADS re overall care;  cards re cardiac status Patient is a 72y old  Male who presents with a chief complaint of left arm pain and tingling, headache, chest and stomach pain x 1 day (20 Mar 2018 03:10)    SUBJECTIVE / OVERNIGHT EVENTS:  Tele: AV paced, PVCs  Received dose midodrine last night for bp 84/62.  No problems reported.  Pt answers simple questions, says his name, gets frustrated.    MEDICATIONS  (STANDING):  amiodarone    Tablet 400 milliGRAM(s) Oral daily  apixaban 2.5 milliGRAM(s) Oral every 12 hours  atorvastatin 20 milliGRAM(s) Oral at bedtime  carvedilol 3.125 milliGRAM(s) Oral every 12 hours  cefpodoxime 100 milliGRAM(s) Oral every 12 hours  famotidine    Tablet 20 milliGRAM(s) Oral daily  latanoprost 0.005% Ophthalmic Solution 1 Drop(s) Both EYES at bedtime  lisinopril 5 milliGRAM(s) Oral daily  potassium chloride    Tablet ER 10 milliEquivalent(s) Oral daily  tamsulosin 0.4 milliGRAM(s) Oral at bedtime    MEDICATIONS  (PRN):  mirtazapine 45 milliGRAM(s) Oral at bedtime PRN insomnia    Vital Signs Last 24 Hrs  T(C): 37 (20 Mar 2018 06:11), Max: 37 (19 Mar 2018 20:48)  T(F): 98.6 (20 Mar 2018 06:11), Max: 98.6 (19 Mar 2018 20:48)  HR: 55 (20 Mar 2018 06:11) (50 - 60)  BP: 104/68 (20 Mar 2018 06:11) (84/62 - 104/68)  RR: 17 (20 Mar 2018 06:11) (17 - 18)  SpO2: 100% (20 Mar 2018 06:11) (100% - 100%)    PHYSICAL EXAM:  GENERAL: thin, frail, elderly BM, NAD, not answering ?s reliably  HEAD:  Atraumatic, Normocephalic  EYES: EOMI, PERRLA, conjunctiva and sclera clear  NECK: Supple, No JVD  CHEST/LUNG: Clear to auscultation bilaterally; No wheeze  HEART: Regular rate and rhythm; No murmurs, rubs, or gallops  ABDOMEN: Soft, Nontender, Nondistended; Bowel sounds present  EXTREMITIES:  2+ Peripheral Pulses, No clubbing, cyanosis, or edema  PSYCH: oriented to self only, gets frustrated with questioning  NEUROLOGY: non-focal  SKIN: No rashes or lesion    LABS:                        11.5   7.19  )-----------( 206      ( 20 Mar 2018 05:37 )             34.3     03-20    143  |  103  |  35<H>  ----------------------------<  84  3.8   |  27  |  1.56<H>    Ca    8.8      20 Mar 2018 05:37  RADIOLOGY & ADDITIONAL TESTS:    Imaging Personally Reviewed:    Consultant(s) Notes Reviewed:      Care Discussed with Consultants/Other Providers: RN, SW, CM, ADS re overall care;  cards re cardiac status

## 2018-03-20 NOTE — DISCHARGE NOTE ADULT - HOSPITAL COURSE
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 No focal parenchymal opacity  - CE neg x2 (Trop 0.15>0.13) BNP 7,081  - no further cardiac wkup needed per cards     UTI:   - 3/15 started Ceftriaxone     - UA- pos, UCx - Enterobacter Cloacae  -  3/19 started PO Vantin    BRYCE:   - likely pre-renal given CHF c/w lasix     CHF  -  Strict I's & O's, daily weights  - continue furosemide, kcl, metolazone, coreg, lisinopril.  - BNP 7000+ on this admission  - Echocardiogram     CVA:    - Eliquis and Statin for secondary stroke prevention    AFib:   - Eliquis for AC, Coreg and Amiodarone for rate control .    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 No focal parenchymal opacity  - CE neg x2 (Trop 0.15>0.13) BNP 7,081  - no further cardiac wkup needed per cards     UTI:   - 3/15 started Ceftriaxone     - UA- pos, UCx - Enterobacter Cloacae  -  3/19 started PO Vantin    BRYCE:   - likely pre-renal given CHF c/w lasix     CHF  -  Strict I's & O's, daily weights  - continue furosemide, kcl, metolazone, coreg, lisinopril.  - BNP 7000+ on this admission  Decrease lasix to 20 mg - will start on 03/22    CVA:    - Eliquis and Statin for secondary stroke prevention    AFib:   - Eliquis for AC, Coreg and Amiodarone for rate control .    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  Patient with hx of CHF and BNP of 7000 on admission however without JVD, no leg edema, no rales  - appreciate cards eval --> given low bp - hold diuretics for now, decrease lisin/coreg, aim for d/c on lasix 20 PO qod.    UTI (urinary tract infection).  Patient with (+) UA. Continue ceftriaxone,  Urine culture with enterobacter sens to ceftriaxone --> change to PO Vantin  ( pt on amiodarone so will not change to cipro for now).     Stroke. continue eliquis & statin  ?underlying cognitive impairment - d/w wife pt "confused" past few years since stroke, +expressive aphasia     Atrial fibrillation. [id number] score: 6 continue eliquis, amiodarone, coreg.  Acute kidney injury superimposed on CKD.  Patient with hx of Stage 3 CKD. Currently with BRYCE, GFR 36%, Monitor BUN/ creat, creatinine stable.    Stable discussion of goals of care with wife, she would like to discuss with her son.  To f/u with team at rehab [facility].

## 2018-03-20 NOTE — PROGRESS NOTE ADULT - PROBLEM SELECTOR PLAN 4
Patient with hx of CHF and current BNP of 7000+ however right now patient without JVD, no leg edema, no rales. Would monitor patient closely . Check Echocardiogram. Strict I's & O's, daily weights, continue furosemide, kcl, metolazone, coreg, lisinopril Patient with hx of CHF and BNP of 7000 on admission however without JVD, no leg edema, no rales  - appreciate cards eval [id number] score: 6 continue eliquis, amiodarone, coreg

## 2018-03-20 NOTE — PROGRESS NOTE ADULT - PROBLEM SELECTOR PLAN 3
Cards consult appreciated troponin elevation likely due to demand ischemia in the setting of ckd, no further cardiac wkup needed per cards Patient with hx of CHF and BNP of 7000 on admission however without JVD, no leg edema, no rales  - appreciate cards eval --> hold diuretics for now, decrease lisin/coreg, aim for d/c on lasix 20 PO qod

## 2018-03-20 NOTE — DISCHARGE NOTE ADULT - PATIENT PORTAL LINK FT
You can access the [facility] Patient Portal, offered by [facility], by registering with the following website: [email or web address]

## 2018-03-20 NOTE — PROGRESS NOTE ADULT - PROBLEM SELECTOR PLAN 2
continue eliquis & statin  ?underlying cognitive impairment - d/w wife pt "confused" past few years since stroke

## 2018-03-20 NOTE — DISCHARGE NOTE ADULT - CARE PROVIDER_API CALL
Rosa Elena Eubanks  [address] [place] [address]  Phone: [phone]  Fax: (   )    - Rosa Elena Eubanks  [address] [place] [address]  Phone: [phone]  Fax: (   )    -    Eric Harrington (MD; PhD), Cardiology; Internal Medicine; Vascular Medicine  [address] [address] [place] [address] [address] [place] [address]  Phone: [phone]  Fax: [phone]

## 2018-03-20 NOTE — DISCHARGE NOTE ADULT - CARE PLAN
Principal Discharge DX:	UTI (urinary tract infection)  Assessment and plan of treatment:	Continue antibiotic as prescribed. Please follow with PCP Dr. Eubanks within 1 week of discharge.  Secondary Diagnosis:	Angina pectoris  Secondary Diagnosis:	Chronic systolic congestive heart failure  Secondary Diagnosis:	Atrial fibrillation  Assessment and plan of treatment:	Continue eliquis, amiodarone, coreg.  Secondary Diagnosis:	Acute kidney injury superimposed on CKD  Secondary Diagnosis:	Hypokalemia  Secondary Diagnosis:	Stroke  Assessment and plan of treatment:	Continue eliquis and statin Principal Discharge DX:	UTI (urinary tract infection)  Goal:	Complete the course of abx  Assessment and plan of treatment:	Continue antibiotic as prescribed. Please follow with PCP Dr. Eubanks within 1 week of discharge.  Secondary Diagnosis:	Angina pectoris  Assessment and plan of treatment:	F/U Cardiology as out pt-  Secondary Diagnosis:	Chronic systolic congestive heart failure  Assessment and plan of treatment:	Lasix was decreased to 20 mg - Coreg/ Lisinopril was decreased   Will likely need to increase dose permissive of BP  Secondary Diagnosis:	Atrial fibrillation  Assessment and plan of treatment:	Continue eliquis, amiodarone, coreg.  Secondary Diagnosis:	Acute kidney injury superimposed on CKD  Secondary Diagnosis:	Hypokalemia  Secondary Diagnosis:	Stroke  Assessment and plan of treatment:	Continue eliquis and statin

## 2018-03-20 NOTE — DISCHARGE NOTE ADULT - MEDICATION SUMMARY - MEDICATIONS TO STOP TAKING
I will STOP taking the medications listed below when I get home from the hospital:    isosorbide mononitrate 30 mg oral tablet, extended release  -- 1 tab(s) by mouth once a day (in the morning)    predniSONE 10 mg oral tablet  -- 1 tab(s) by mouth once a day    mirtazapine 45 mg oral tablet  -- 1 tab(s) by mouth once a day (at bedtime), As Needed    warfarin 2.5 mg oral tablet  -- 1 tab(s) by mouth once a day    mirtazapine 45 mg oral tablet  -- 1 tab(s) by mouth once a day (at bedtime), As Needed    metOLazone 2.5 mg oral tablet  -- 1 tab(s) by mouth every 72 hours

## 2018-03-20 NOTE — PROGRESS NOTE ADULT - PROBLEM SELECTOR PLAN 1
Patient with (+) UA. Continue ceftriaxone,  Urine culture with enterobacter sens to ceftriaxone --> change to PO Vantin  ( pt on amiodarone so will not change to cipro for now)

## 2018-03-20 NOTE — DISCHARGE NOTE ADULT - PROVIDER TOKENS
FREE:[LAST:[Cha],FIRST:[Rosa Elena],PHONE:[phone]],FAX:[(   )    -],ADDRESS:[[address] [place] [address]]] FREE:[LAST:[Cha],FIRST:[Rosa Elena],PHONE:[phone]],FAX:[(   )    -],ADDRESS:[[address] [place] [address]]],TOKEN:'[id number]'

## 2018-03-20 NOTE — DISCHARGE NOTE ADULT - MEDICATION SUMMARY - MEDICATIONS TO TAKE
I will START or STAY ON the medications listed below when I get home from the hospital:    lisinopril 5 mg oral tablet  -- 1 tab(s) by mouth once a day  -- Indication: For Htn    tamsulosin 0.4 mg oral capsule  -- 1 cap(s) by mouth once a day (at bedtime)  -- Indication: For BPH    amiodarone 200 mg oral tablet  -- 2 tab(s) by mouth once a day  -- Indication: For ACS (acute coronary syndrome)    Eliquis 2.5 mg oral tablet  -- 1 tab(s) by mouth 2 times a day  -- Indication: For Atrial fibrillation    atorvastatin 20 mg oral tablet  -- 1 tab(s) by mouth once a day  -- Indication: For HLD    Coreg 3.125 mg oral tablet  -- 1 tab(s) by mouth 2 times a day  Hold for sbp<100  -- Indication: For CHF exacerbation    cefpodoxime 100 mg oral tablet  -- 1 tab(s) by mouth every 12 hours  Stop after 2 days   -- Indication: For UTI (urinary tract infection)    Lasix 20 mg oral tablet  -- 1 tab(s) by mouth once a day  To be started on 03/22/18  Hold SBP<100  -- Indication: For CHF exacerbation    raNITIdine 150 mg oral tablet  -- 1 tab(s) by mouth 2 times a day  -- Indication: For GI PPX     potassium chloride 10 mEq oral tablet, extended release  -- 1 tab(s) by mouth once a day  -- Indication: For CHF exacerbation    Lumigan 0.01% ophthalmic solution  -- 1 drop(s) to each affected eye once a day (in the evening)  -- Indication: For EYE GTTS

## 2018-03-20 NOTE — PROGRESS NOTE ADULT - PROBLEM SELECTOR PROBLEM 1
Angina pectoris
Angina pectoris
UTI (urinary tract infection)
Angina pectoris
UTI (urinary tract infection)

## 2018-03-20 NOTE — PROGRESS NOTE ADULT - PROBLEM SELECTOR PLAN 5
[id number] score: 6 continue eliquis, amiodarone, coreg Patient with hx of Stage 3 CKD. Currently with BRYCE, GFR 36%, Monitor BUN/ creat, creatinine stable

## 2018-03-20 NOTE — PROGRESS NOTE ADULT - ATTENDING COMMENTS
PT rec rehab  D/w wife by phone, said she would be in later today.  She will d/w SW. PT rec rehab --> SW d/w wife, first choice [facility], await eval PT rec rehab --> SW d/w wife, first choice [facility], [facility] eval    Spent 40 minutes counseling and coordinating discharge care.

## 2018-03-20 NOTE — PROGRESS NOTE ADULT - ASSESSMENT
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

## 2018-03-20 NOTE — CONSULT NOTE ADULT - SUBJECTIVE AND OBJECTIVE BOX
Cardiology/Vascular Medicine Inpatient Consultation Note      HISTORY OF PRESENT ILLNESS:  Patient is a 73 yo M presenting with left arm pain and tingling, headache, chest and abdominal pain x 1 day. As per patient, he had left arm tingling, headache, chest and abdominal pain that onset yesterday evening while he was ambulating. He describes the chest pain as a sharp pressure, non-pleuritic, which radiated to his LUE and was associated with SOB and palpitations. The entire episode lasted for approximately 30 minutes. He states he's had generalized abdominal pain x years but reports multiple episodes of diarrhea the past 3 days, approximately 3 episodes per day. His wife is unsure whether he has diarrhea because his home health aide takes care of him, but states she smelled diarrhea a few times last week. She also mentions that the patient was coughing a lot yesterday, which was dry and nonproductive. As per the wife, the patient's HHA told her that the patient seemed more tired recently and didn't want to walk as much last week, and seemed to have trouble lifting his feet when walking. Otherwise, no fever, no vomiting, no syncope. History is limited & difficult to obtain due to patient's baseline dysarthria. (15 Mar 2018 10:02)          Allergies    No Known Allergies    Intolerances    	    MEDICATIONS:  amiodarone    Tablet 400 milliGRAM(s) Oral daily  apixaban 2.5 milliGRAM(s) Oral every 12 hours  carvedilol 6.25 milliGRAM(s) Oral every 12 hours  lisinopril 10 milliGRAM(s) Oral daily  metolazone 2.5 milliGRAM(s) Oral <User Schedule>  tamsulosin 0.4 milliGRAM(s) Oral at bedtime    cefpodoxime 100 milliGRAM(s) Oral every 12 hours      mirtazapine 45 milliGRAM(s) Oral at bedtime PRN    famotidine    Tablet 20 milliGRAM(s) Oral daily    atorvastatin 20 milliGRAM(s) Oral at bedtime    latanoprost 0.005% Ophthalmic Solution 1 Drop(s) Both EYES at bedtime  potassium chloride    Tablet ER 10 milliEquivalent(s) Oral daily      PAST MEDICAL & SURGICAL HISTORY:  Depression  Prostate cancer: s/p seed implants 2010  Hypotension  Temporal arteritis: in 2013  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  CHF (congestive heart failure): With EF of 20%  Atrial fibrillation  History of hip replacement: left hip replacement 2009      FAMILY HISTORY:  Family history of cancer (Sibling)      SOCIAL HISTORY:    [ ] Non-smoker  [ ] Smoker  [ ] Alcohol    REVIEW OF SYSTEMS:  CONSTITUTIONAL: No fever, weight loss, or fatigue  EYES: No eye pain, visual disturbances, or discharge  ENMT:  No difficulty hearing, tinnitus, vertigo; No sinus or throat pain  NECK: No pain or stiffness  RESPIRATORY: No cough, wheezing, chills or hemoptysis; No Shortness of Breath  CARDIOVASCULAR: No chest pain, palpitations, passing out, dizziness, or leg swelling  GASTROINTESTINAL: No abdominal or epigastric pain. No nausea, vomiting, or hematemesis; No diarrhea or constipation. No melena or hematochezia.  GENITOURINARY: No dysuria, frequency, hematuria, or incontinence  NEUROLOGICAL: No headaches, memory loss, loss of strength, numbness, or tremors  SKIN: No itching, burning, rashes, or lesions   LYMPH Nodes: No enlarged glands  ENDOCRINE: No heat or cold intolerance; No hair loss  MUSCULOSKELETAL: No joint pain or swelling; No muscle, back, or extremity pain  PSYCHIATRIC: No depression, anxiety, mood swings, or difficulty sleeping  HEME/LYMPH: No easy bruising, or bleeding gums  ALLERY AND IMMUNOLOGIC: No hives or eczema	    [ ] All others negative	  [ ] Unable to obtain    PHYSICAL EXAM:  T(C): 37 (03-20-18 @ 06:11), Max: 37 (03-19-18 @ 20:48)  HR: 55 (03-20-18 @ 06:11) (50 - 61)  BP: 104/68 (03-20-18 @ 06:11) (84/62 - 110/62)  RR: 17 (03-20-18 @ 06:11) (17 - 18)  SpO2: 100% (03-20-18 @ 06:11) (100% - 100%)  Wt(kg): --  I&O's Summary    19 Mar 2018 07:01  -  20 Mar 2018 07:00  --------------------------------------------------------  IN: 600 mL / OUT: 240 mL / NET: 360 mL        Appearance: Normal	  HEENT:   Normal oral mucosa, PERRL, EOMI	  Lymphatic: No lymphadenopathy  Cardiovascular: Normal S1 S2, No JVD, No murmurs, No edema  Respiratory: Lungs clear to auscultation	  Psychiatry: A & O x 3, Mood & affect appropriate  Gastrointestinal:  Soft, Non-tender, + BS	  Skin: No rashes, No ecchymoses, No cyanosis	  Neurologic: Non-focal  Extremities: Normal range of motion, No clubbing, cyanosis or edema  Vascular: Peripheral pulses palpable 2+ bilaterally      LABS:	 	    CBC Full  -  ( 20 Mar 2018 05:37 )  WBC Count : 7.19 K/uL  Hemoglobin : 11.5 g/dL  Hematocrit : 34.3 %  Platelet Count - Automated : 206 K/uL  Mean Cell Volume : 102.4 fL  Mean Cell Hemoglobin : 34.3 pg  Mean Cell Hemoglobin Concentration : 33.5 %  Auto Neutrophil # : 4.52 K/uL  Auto Lymphocyte # : 1.77 K/uL  Auto Monocyte # : 0.74 K/uL  Auto Eosinophil # : 0.07 K/uL  Auto Basophil # : 0.04 K/uL  Auto Neutrophil % : 62.8 %  Auto Lymphocyte % : 24.6 %  Auto Monocyte % : 10.3 %  Auto Eosinophil % : 1.0 %  Auto Basophil % : 0.6 %    03-20    143  |  103  |  35<H>  ----------------------------<  84  3.8   |  27  |  1.56<H>  03-19    138  |  99  |  33<H>  ----------------------------<  84  4.6   |  25  |  1.52<H>    Ca    8.8      20 Mar 2018 05:37  Ca    8.9      19 Mar 2018 04:50        proBNP:   Lipid Profile:   HgA1c:   TSH:       CARDIAC MARKERS:            TELEMETRY: 	    ECG:   	  RADIOLOGY:  OTHER: 	      PREVIOUS DIAGNOSTIC CARDIOVASCULAR TESTING:      [ ]  Echocardiogram:  [ ]  Catheterization:  [ ]  Stress test:    [ ]  Vascular studies: Cardiology/Vascular Medicine Inpatient Consultation Note      HISTORY OF PRESENT ILLNESS:  Patient is a 73 yo M presenting with left arm pain and tingling, headache, chest and abdominal pain x 1 day. As per patient, he had left arm tingling, headache, chest and abdominal pain that onset yesterday evening while he was ambulating. He describes the chest pain as a sharp pressure, non-pleuritic, which radiated to his LUE and was associated with SOB and palpitations. The entire episode lasted for approximately 30 minutes. He states he's had generalized abdominal pain x years but reports multiple episodes of diarrhea the past 3 days, approximately 3 episodes per day. His wife is unsure whether he has diarrhea because his home health aide takes care of him, but states she smelled diarrhea a few times last week. She also mentions that the patient was coughing a lot yesterday, which was dry and nonproductive. As per the wife, the patient's HHA told her that the patient seemed more tired recently and didn't want to walk as much last week, and seemed to have trouble lifting his feet when walking. Otherwise, no fever, no vomiting, no syncope. History is limited & difficult to obtain due to patient's baseline dysarthria. (15 Mar 2018 10:02)    Patient was given furosemide earlier on this admission.  At the time of my examination, patient appears to have stable volume status.  No active cardiac complaints.  BPs borderline-low but patient has no current complaints.      Allergies  No Known Allergies    MEDICATIONS:  amiodarone    Tablet 400 milliGRAM(s) Oral daily  apixaban 2.5 milliGRAM(s) Oral every 12 hours  carvedilol 6.25 milliGRAM(s) Oral every 12 hours  lisinopril 10 milliGRAM(s) Oral daily  metolazone 2.5 milliGRAM(s) Oral <User Schedule>  tamsulosin 0.4 milliGRAM(s) Oral at bedtime    cefpodoxime 100 milliGRAM(s) Oral every 12 hours      mirtazapine 45 milliGRAM(s) Oral at bedtime PRN    famotidine    Tablet 20 milliGRAM(s) Oral daily    atorvastatin 20 milliGRAM(s) Oral at bedtime    latanoprost 0.005% Ophthalmic Solution 1 Drop(s) Both EYES at bedtime  potassium chloride    Tablet ER 10 milliEquivalent(s) Oral daily      PAST MEDICAL & SURGICAL HISTORY:  Depression  Prostate cancer: s/p seed implants 2010  Hypotension  Temporal arteritis: in 2013  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  CHF (congestive heart failure): With EF of 20%  Atrial fibrillation  History of hip replacement: left hip replacement 2009      FAMILY HISTORY:  Family history of cancer (Sibling)      SOCIAL HISTORY:    [place]    REVIEW OF SYSTEMS:  CONSTITUTIONAL: As above  EYES: No eye pain, visual disturbances, or discharge  ENMT:  No difficulty hearing, tinnitus, vertigo; No sinus or throat pain  NECK: No pain or stiffness  RESPIRATORY: As above  CARDIOVASCULAR: As above  GASTROINTESTINAL: As above  GENITOURINARY: No dysuria, frequency, hematuria, or incontinence  NEUROLOGICAL: As above  SKIN: No itching, burning, rashes, or lesions   LYMPH Nodes: No enlarged glands  ENDOCRINE: No heat or cold intolerance; No hair loss  MUSCULOSKELETAL: No joint pain or swelling; No muscle, back, or extremity pain  PSYCHIATRIC: As above    PHYSICAL EXAM:  T(C): 37 (03-20-18 @ 06:11), Max: 37 (03-19-18 @ 20:48)  HR: 55 (03-20-18 @ 06:11) (50 - 61)  BP: 104/68 (03-20-18 @ 06:11) (84/62 - 110/62)  RR: 17 (03-20-18 @ 06:11) (17 - 18)  SpO2: 100% (03-20-18 @ 06:11) (100% - 100%)  Wt(kg): --  I&O's Summary    19 Mar 2018 07:01  -  20 Mar 2018 07:00  --------------------------------------------------------  IN: 600 mL / OUT: 240 mL / NET: 360 mL        Appearance: NAD, thin, elderly man  HEENT:   Normal oral mucosa, PERRL, EOMI	  Lymphatic: No lymphadenopathy  Cardiovascular: Normal S1 S2, No JVD, No murmurs, No edema  Respiratory: Decreased BS b/l bases  Psychiatry: Awake  Gastrointestinal:  Soft, Non-tender, + BS	  Skin: No rashes, No ecchymoses, No cyanosis	  Neurologic: Non-focal  Extremities: Normal range of motion, No clubbing, cyanosis or edema  Vascular: Peripheral pulses palpable 2+ bilaterally      LABS:	 	                          11.5   7.19  )-----------( 206      ( 20 Mar 2018 05:37 )             34.3     03-20    143  |  103  |  35<H>  ----------------------------<  84  3.8   |  27  |  1.56<H>  03-19    138  |  99  |  33<H>  ----------------------------<  84  4.6   |  25  |  1.52<H>    Ca    8.8      20 Mar 2018 05:37  Ca    8.9      19 Mar 2018 04:50      Serum Pro-Brain Natriuretic Peptide (03.14.18 @ 23:49)    Serum Pro-Brain Natriuretic Peptide: 7081:   ACUTE CONGESTIVE HEART FAILURE is UNLIKELY if NT-proBNP is < 300 pg/mL.    < from: Transthoracic Echocardiogram (08.06.17 @ 11:10) >    Patient name: SAIMA NARANJO  YOB: 1945   Age: 72 (M)   MR#: [id number]  Study Date: 8/6/2017  [facility]  Study quality: Technically good  Referring Physician: Ricky Bryant MD  Blood Pressure: 110/68 mmHg  Height: 165 cm  Weight: 59 kg  BSA: 1.7 m2  ------------------------------------------------------------------------  PROCEDURE: Transthoracic echocardiogram with 2-D, M-Mode  and complete spectral and color flow Doppler.  INDICATION: Chest pain, unspecified (R07.9)  ------------------------------------------------------------------------  DIMENSIONS:  Dimensions:     Normal Values:  LA:     4.8 cm    2.0 - 4.0 cm  Ao:     2.9 cm    2.0 - 3.8 cm  SEPTUM: 2.1 cm    0.6 - 1.2 cm  PWT:    2.0 cm  0.6 - 1.1 cm  LVIDd:  3.4 cm    3.0 - 5.6 cm  LVIDs:  3.2 cm    1.8 - 4.0 cm  Derived Variables:  LVMI: 193 g/m2  RWT: 1.17  Fractional short: 6 %  Ejection Fraction (Teicholtz): 14 %  ------------------------------------------------------------------------  OBSERVATIONS:  Mitral Valve: Tethered mitral valve leaflets with normal  opening. Mild-moderate mitral regurgitation.  Aortic Root: Normal aortic root.  Aortic Valve: Normal trileaflet aortic valve.  Left Atrium: Severely dilated left atrium.  LA volume index  = 57 cc/m2.  Left Ventricle: Severe global left ventricular systolic  dysfunction. Severe concentric left ventricular  hypertrophy.  Right Heart: Moderate right atrial enlargement. Normal  right ventricular size with decreased right ventricular  systolic function. A device wire is noted in the right  heart. Normal tricuspid valve. Mild tricuspid  regurgitation. Normal pulmonic valve.  Pericardium/PleuraNormal pericardium with no pericardial  effusion.  Hemodynamic: Estimated right ventricular systolic pressure  equals 41 mm Hg, assuming right atrial pressure equals 10  mm Hg, consistent with mild pulmonary hypertension.  ------------------------------------------------------------------------  CONCLUSIONS:  1. Tethered mitral valve leaflets with normal opening.  Mild-moderate mitral regurgitation.  2. Severe concentric left ventricular hypertrophy.  3. Severe global left ventricular systolic dysfunction.  4. Moderate right atrial enlargement.  5. Normal right ventricular size with decreased right  ventricular systolic function. A device wire is noted in  the right heart.  *** Compared with echocardiogram of 6/29/2016, no  significant changes noted.  ------------------------------------------------------------------------  Confirmed on  8/6/2017 - 16:11:56 by Viki Joyner MD  ------------------------------------------------------------------------    < from: Xray Chest 1 View- PORTABLE-Urgent (03.15.18 @ 01:48) >    EXAM:  XR CHEST PORTABLE URGENT 1V        PROCEDURE DATE:  Mar 15 2018         INTERPRETATION:  CLINICAL INFORMATION:  Shortness of breath.    TECHNIQUE: Frontal chest radiograph.    COMPARISON:  Chest x-ray 8/2/2017.    FINDINGS:    Hardware: There is a left-sided biventricular AICD.    Lungs: Low lung volumes with resultant vascular crowding. No focal   parenchymal opacity.  There is no pleural effusion or pneumothorax.    Heart/Mediastinum: The cardiomediastinal silhouette is unchanged.    Bones: There is no acute osseous abnormality.      IMPRESSION:    Low lung volumes with resultant vascular crowding.   No focal parenchymal opacity.         CARTER ORTEGA M.D., RADIOLOGY RESIDENT  This document has been electronically signed.  JUSTICE MITCHELL M.D.; ATTENDING RADIOLOGIST  This document has been electronically signed. Mar 15 2018  1:52AM

## 2018-03-20 NOTE — CONSULT NOTE ADULT - ASSESSMENT
Severe non-ischemic cardiomyopathy  Volume status stable.  Given markedly low LVEF, patient will probably need maintenance dose of diuretics.  Plan for discharge to nursing home.  Recommend discharge on furosemide 20 mg QOD, while assessing daily weights and continued reassessment of volume status.  Consider decreasing dose of Coreg and Lisinopril to allow for BP stabilization.  D/C metolazone.  D/W Dr. Shah.

## 2018-03-30 NOTE — ED ADULT TRIAGE NOTE - NS ED NURSE BANDS TYPE
cc:

Marita Riojas MD

****

 

 

DATE:

03/30/2018

 

REASON FOR CONSULTATION:

Acute kidney injury with elevated BUN and creatinine.

 

HISTORY OF PRESENT ILLNESS:

This is 69-year-old male with a past medical history of chronic 

obstructive pulmonary disease, history of deep venous thrombosis with 

pulmonary embolism, atrial fibrillation, hypertension, ischemic heart 

disease, congestive heart failure, who was admitted from the nursing 

home with generalized weakness and altered mental status.

 

I was called to see the patient because of elevated BUN and 

creatinine.  The patient was found febrile on admission and he was in 

respiratory distress and he was intubated and he was started on a 

Cardizem drip and shortly after he developed asystole and ACLS 

protocol was done and since then the patient has increasing 

creatinine.  His creatinine on presentation was 1.1, which is 

gradually increasing and his urine output declining.  The patient also

has shock liver.  He was hypotensive initially.  He was on pressors, 

but he is off pressors now, but is still on the ventilator.

 

Most of the history is taken from the patient's chart and some from 

the patient's sister, who is sitting at the bedside.  The patient has 

gradual decrease in the urine output.  He was started on diuretics.  

Blood pressure is better now and he is off pressors.

 

PAST MEDICAL HISTORY:

Ischemic heart disease, congestive heart failure, chronic obstructive 

pulmonary disease, hypertension, atrial fibrillation, history of deep 

venous thrombosis and pulmonary embolism.

 

PAST SURGICAL HISTORY:

Appendicectomy, tonsillectomy, hernia repair surgery.

 

REVIEW OF SYSTEMS:

Cannot be taken since the patient is intubated.

 

SOCIAL HISTORY:

The patient lives in a nursing facility.  He has history of smoking 

and alcohol abuse in the past.

 

FAMILY HISTORY:

Noncontributory.

 

ALLERGIES:

HE HAS NO KNOWN DRUG ALLERGIES.

 

MEDICATIONS:

Currently, he is on:

1   IV fluid with sodium bicarbonate.

2.  Janice-Colace 1 tablet b.i.d.

3.  Famotidine 10 mg b.i.d.

4.  Thiamine 100 mg once a day.

5.  Multivitamin 1 tablet daily.

6.  DuoNeb nebulizer.

7.  Vancomycin 1 gram IV q. 12 hours.

8.  Amiodarone 200 mg q. 12 hours.

9.  Azithromycin 500 mg IV q. 24 hours.

10.  Zosyn 2.25 grams IV q. 6 hours.

11.  Lasix 100 mg IV q. 6 hours.

12.  Albumin q.  6 hours.

13.  Methylprednisolone 40 mg IV q. 8 hours.

14.  Insulin q. 4 hours.

15.  Clonidine as needed.

 

PHYSICAL EXAMINATION:

GENERAL:  The patient is intubated and sedated.

VITAL SIGNS:  His last blood pressure was 119/68, temperature 98.2, 

oxygen saturation 35%, FIO2 98-99%.

HEENT:  Pupils are mid-constricted.  Nonicteric sclerae.  Conjunctivae

pale.

NECK:  Supple.  JVD is not elevated.

LUNGS:  :The patient has bilateral decreased air entry with scattered 

wheezing and basilar rales.

HEART:  S1, S2.  Irregular rhythm.

ABDOMEN:  Distended, soft, lax.  Bowel sounds positive.

EXTREMITIES:  He has bilateral 2+ edema.

 

INVESTIGATIONS:

WBC count is 10.5, hemoglobin 8.6, platelet count of 57.

INR 1.1 with a PTT of greater than 277.

Sodium 136, potassium 4.8, chloride 102, bicarbonate 21, BUN 86, 

creatinine 4.1, calcium 6.7, corrected calcium 7.7, phosphorus 8.0, 

AST is 397, ALT is 2541, total protein is 5.1 with albumin of 2.1.

Urinalysis showing protein of 100.  This was done on admission.

Urine eosinophils and urine sodium pending.

 

IMAGING STUDIES:

The patient had ultrasound of the kidneys done 2 days ago which shows 

that both kidneys are normal in size with no hydronephrosis.  There is

increased echogenicity suggestive of chronic medical disease.

 

CT scan of the chest was done without IV contrast and it shows that he

has improvement in the aeration and infiltrate in both lungs, small 

bilateral pleural effusion.

 

CT scan of the brain was done and was negative for any lesion.

 

ASSESSMENT AND PLAN:

1.  Post-cardiac arrest with asystole.

2.  Respiratory failure with pneumonia.

3.  Acute kidney injury.

4.  Metabolic acidosis.

5.  Encephalopathy.

6.  Alcohol dependence and withdrawal.

7.  Shock liver.

8.  Anemia.

 

The patient has multiorgan failure and he has metabolic acidosis.  

Lactic acid was high at 5.7 and now is 2.4, pH has improved to 7.30 

after giving the bicarbonate infusion.  At present the urine output is

declining.  I agree with continuing the IV fluid and diuretics.  Most 

likely he has acute kidney injury because of acute tubular necrosis 

because of the cardiac arrest.  Blood pressure has stabilized now he 

is off pressors.  There is no acute urgent need for dialysis at this 

point, but I did discuss with the sister and she does not want him to 

go for dialysis at present, but asking to discuss with them again in 

case if the patient is requiring dialysis.  She has already made the 

patient a NO CODE and palliative care on the case and she also 

indicated that they are also considering withdrawal of the care.

 

So at present there is no acute urgent need for dialysis.  When it 

comes to that, then I will discuss with the sister again, but I told 

her that his overall prognosis is not very good.

 

Thank you for the consultation, and I will follow the patient while he

is in the hospital.

 

 

__________________________________

MD CARSON Villafana/BRIANDA

D: 03/30/2018, 10:34 AM

T: 03/30/2018, 11:01 AM

Visit #: [id number]

Job #: [id number]

YAJAIRA
Name band;

## 2018-05-01 ENCOUNTER — OUTPATIENT (OUTPATIENT)
Dept: OUTPATIENT SERVICES | Facility: HOSPITAL | Age: 73
LOS: 1 days | End: 2018-05-01
Payer: MEDICAID

## 2018-05-01 DIAGNOSIS — Z96.649 PRESENCE OF UNSPECIFIED ARTIFICIAL HIP JOINT: Chronic | ICD-10-CM

## 2018-05-01 PROCEDURE — G9001: CPT

## 2018-05-08 ENCOUNTER — INPATIENT (INPATIENT)
Facility: HOSPITAL | Age: 73
LOS: 10 days | Discharge: HOME | End: 2018-05-19
Attending: HOSPITALIST | Admitting: HOSPITALIST
Payer: MEDICARE

## 2018-05-08 VITALS
TEMPERATURE: 99 F | HEART RATE: 61 BPM | DIASTOLIC BLOOD PRESSURE: 60 MMHG | SYSTOLIC BLOOD PRESSURE: 99 MMHG | OXYGEN SATURATION: 100 % | RESPIRATION RATE: 16 BRPM

## 2018-05-08 DIAGNOSIS — F32.9 MAJOR DEPRESSIVE DISORDER, SINGLE EPISODE, UNSPECIFIED: ICD-10-CM

## 2018-05-08 DIAGNOSIS — I50.23 ACUTE ON CHRONIC SYSTOLIC (CONGESTIVE) HEART FAILURE: ICD-10-CM

## 2018-05-08 DIAGNOSIS — Z96.649 PRESENCE OF UNSPECIFIED ARTIFICIAL HIP JOINT: Chronic | ICD-10-CM

## 2018-05-08 DIAGNOSIS — E87.6 HYPOKALEMIA: ICD-10-CM

## 2018-05-08 DIAGNOSIS — I50.9 HEART FAILURE, UNSPECIFIED: ICD-10-CM

## 2018-05-08 DIAGNOSIS — C61 MALIGNANT NEOPLASM OF PROSTATE: ICD-10-CM

## 2018-05-08 DIAGNOSIS — I95.0 IDIOPATHIC HYPOTENSION: ICD-10-CM

## 2018-05-08 DIAGNOSIS — Z29.9 ENCOUNTER FOR PROPHYLACTIC MEASURES, UNSPECIFIED: ICD-10-CM

## 2018-05-08 DIAGNOSIS — I48.91 UNSPECIFIED ATRIAL FIBRILLATION: ICD-10-CM

## 2018-05-08 DIAGNOSIS — M31.6 OTHER GIANT CELL ARTERITIS: ICD-10-CM

## 2018-05-08 DIAGNOSIS — H40.9 UNSPECIFIED GLAUCOMA: ICD-10-CM

## 2018-05-08 DIAGNOSIS — I63.9 CEREBRAL INFARCTION, UNSPECIFIED: ICD-10-CM

## 2018-05-08 LAB
ALBUMIN SERPL ELPH-MCNC: 3.1 G/DL — LOW (ref 3.3–5)
ALP SERPL-CCNC: 138 U/L — HIGH (ref 40–120)
ALT FLD-CCNC: 15 U/L — SIGNIFICANT CHANGE UP (ref 4–41)
AST SERPL-CCNC: 20 U/L — SIGNIFICANT CHANGE UP (ref 4–40)
BASE EXCESS BLDV CALC-SCNC: 4.4 MMOL/L — SIGNIFICANT CHANGE UP
BASOPHILS # BLD AUTO: 0.03 K/UL — SIGNIFICANT CHANGE UP (ref 0–0.2)
BASOPHILS NFR BLD AUTO: 0.4 % — SIGNIFICANT CHANGE UP (ref 0–2)
BILIRUB SERPL-MCNC: 1.4 MG/DL — HIGH (ref 0.2–1.2)
BLOOD GAS VENOUS - CREATININE: 0.93 MG/DL — SIGNIFICANT CHANGE UP (ref 0.5–1.3)
BUN SERPL-MCNC: 24 MG/DL — HIGH (ref 7–23)
CALCIUM SERPL-MCNC: 9 MG/DL — SIGNIFICANT CHANGE UP (ref 8.4–10.5)
CHLORIDE BLDV-SCNC: 104 MMOL/L — SIGNIFICANT CHANGE UP (ref 96–108)
CHLORIDE SERPL-SCNC: 101 MMOL/L — SIGNIFICANT CHANGE UP (ref 98–107)
CK MB BLD-MCNC: 2.67 NG/ML — SIGNIFICANT CHANGE UP (ref 1–6.6)
CK MB BLD-MCNC: SIGNIFICANT CHANGE UP (ref 0–2.5)
CK SERPL-CCNC: 93 U/L — SIGNIFICANT CHANGE UP (ref 30–200)
CO2 SERPL-SCNC: 26 MMOL/L — SIGNIFICANT CHANGE UP (ref 22–31)
CREAT SERPL-MCNC: 1.11 MG/DL — SIGNIFICANT CHANGE UP (ref 0.5–1.3)
EOSINOPHIL # BLD AUTO: 0.05 K/UL — SIGNIFICANT CHANGE UP (ref 0–0.5)
EOSINOPHIL NFR BLD AUTO: 0.6 % — SIGNIFICANT CHANGE UP (ref 0–6)
GAS PNL BLDV: 138 MMOL/L — SIGNIFICANT CHANGE UP (ref 136–146)
GLUCOSE BLDV-MCNC: 98 — SIGNIFICANT CHANGE UP (ref 70–99)
GLUCOSE SERPL-MCNC: 96 MG/DL — SIGNIFICANT CHANGE UP (ref 70–99)
HCO3 BLDV-SCNC: 27 MMOL/L — SIGNIFICANT CHANGE UP (ref 20–27)
HCT VFR BLD CALC: 33 % — LOW (ref 39–50)
HCT VFR BLDV CALC: 34.6 % — LOW (ref 39–51)
HGB BLD-MCNC: 10.5 G/DL — LOW (ref 13–17)
HGB BLDV-MCNC: 11.2 G/DL — LOW (ref 13–17)
IMM GRANULOCYTES # BLD AUTO: 0.04 # — SIGNIFICANT CHANGE UP
IMM GRANULOCYTES NFR BLD AUTO: 0.5 % — SIGNIFICANT CHANGE UP (ref 0–1.5)
LACTATE BLDV-MCNC: 2.2 MMOL/L — HIGH (ref 0.5–2)
LIDOCAIN IGE QN: 10.5 U/L — SIGNIFICANT CHANGE UP (ref 7–60)
LYMPHOCYTES # BLD AUTO: 1.04 K/UL — SIGNIFICANT CHANGE UP (ref 1–3.3)
LYMPHOCYTES # BLD AUTO: 13.2 % — SIGNIFICANT CHANGE UP (ref 13–44)
MCHC RBC-ENTMCNC: 31.8 % — LOW (ref 32–36)
MCHC RBC-ENTMCNC: 32.4 PG — SIGNIFICANT CHANGE UP (ref 27–34)
MCV RBC AUTO: 101.9 FL — HIGH (ref 80–100)
MONOCYTES # BLD AUTO: 0.8 K/UL — SIGNIFICANT CHANGE UP (ref 0–0.9)
MONOCYTES NFR BLD AUTO: 10.2 % — SIGNIFICANT CHANGE UP (ref 2–14)
NEUTROPHILS # BLD AUTO: 5.89 K/UL — SIGNIFICANT CHANGE UP (ref 1.8–7.4)
NEUTROPHILS NFR BLD AUTO: 75.1 % — SIGNIFICANT CHANGE UP (ref 43–77)
NRBC # FLD: 0.03 — SIGNIFICANT CHANGE UP
NT-PROBNP SERPL-SCNC: SIGNIFICANT CHANGE UP PG/ML
PCO2 BLDV: 45 MMHG — SIGNIFICANT CHANGE UP (ref 41–51)
PH BLDV: 7.42 PH — SIGNIFICANT CHANGE UP (ref 7.32–7.43)
PLATELET # BLD AUTO: 229 K/UL — SIGNIFICANT CHANGE UP (ref 150–400)
PMV BLD: 10.3 FL — SIGNIFICANT CHANGE UP (ref 7–13)
PO2 BLDV: 32 MMHG — LOW (ref 35–40)
POTASSIUM BLDV-SCNC: 3.2 MMOL/L — LOW (ref 3.4–4.5)
POTASSIUM SERPL-MCNC: 3.4 MMOL/L — LOW (ref 3.5–5.3)
POTASSIUM SERPL-SCNC: 3.4 MMOL/L — LOW (ref 3.5–5.3)
PROT SERPL-MCNC: 6.6 G/DL — SIGNIFICANT CHANGE UP (ref 6–8.3)
RBC # BLD: 3.24 M/UL — LOW (ref 4.2–5.8)
RBC # FLD: 18.1 % — HIGH (ref 10.3–14.5)
SAO2 % BLDV: 51.8 % — LOW (ref 60–85)
SODIUM SERPL-SCNC: 140 MMOL/L — SIGNIFICANT CHANGE UP (ref 135–145)
TROPONIN T SERPL-MCNC: 0.15 NG/ML — HIGH (ref 0–0.06)
WBC # BLD: 7.85 K/UL — SIGNIFICANT CHANGE UP (ref 3.8–10.5)
WBC # FLD AUTO: 7.85 K/UL — SIGNIFICANT CHANGE UP (ref 3.8–10.5)

## 2018-05-08 PROCEDURE — 71045 X-RAY EXAM CHEST 1 VIEW: CPT | Mod: 26

## 2018-05-08 PROCEDURE — 99223 1ST HOSP IP/OBS HIGH 75: CPT

## 2018-05-08 PROCEDURE — 74177 CT ABD & PELVIS W/CONTRAST: CPT | Mod: 26

## 2018-05-08 RX ORDER — SODIUM CHLORIDE 9 MG/ML
3 INJECTION INTRAMUSCULAR; INTRAVENOUS; SUBCUTANEOUS EVERY 8 HOURS
Qty: 0 | Refills: 0 | Status: DISCONTINUED | OUTPATIENT
Start: 2018-05-08 | End: 2018-05-19

## 2018-05-08 RX ORDER — LISINOPRIL 2.5 MG/1
10 TABLET ORAL DAILY
Qty: 0 | Refills: 0 | Status: DISCONTINUED | OUTPATIENT
Start: 2018-05-08 | End: 2018-05-10

## 2018-05-08 RX ORDER — ATORVASTATIN CALCIUM 80 MG/1
1 TABLET, FILM COATED ORAL
Qty: 0 | Refills: 0 | COMMUNITY

## 2018-05-08 RX ORDER — CARVEDILOL PHOSPHATE 80 MG/1
1 CAPSULE, EXTENDED RELEASE ORAL
Qty: 0 | Refills: 0 | COMMUNITY

## 2018-05-08 RX ORDER — FUROSEMIDE 40 MG
40 TABLET ORAL ONCE
Qty: 0 | Refills: 0 | Status: COMPLETED | OUTPATIENT
Start: 2018-05-08 | End: 2018-05-08

## 2018-05-08 RX ORDER — MIRTAZAPINE 45 MG/1
1 TABLET, ORALLY DISINTEGRATING ORAL
Qty: 0 | Refills: 0 | COMMUNITY

## 2018-05-08 RX ORDER — BIMATOPROST 0.3 MG/ML
1 SOLUTION/ DROPS OPHTHALMIC
Qty: 0 | Refills: 0 | COMMUNITY

## 2018-05-08 RX ORDER — FUROSEMIDE 40 MG
1 TABLET ORAL
Qty: 0 | Refills: 0 | COMMUNITY

## 2018-05-08 RX ORDER — FUROSEMIDE 40 MG
40 TABLET ORAL
Qty: 0 | Refills: 0 | Status: DISCONTINUED | OUTPATIENT
Start: 2018-05-08 | End: 2018-05-09

## 2018-05-08 RX ORDER — DORZOLAMIDE HYDROCHLORIDE 20 MG/ML
1 SOLUTION/ DROPS OPHTHALMIC ONCE
Qty: 0 | Refills: 0 | Status: COMPLETED | OUTPATIENT
Start: 2018-05-08 | End: 2018-05-09

## 2018-05-08 RX ORDER — MORPHINE SULFATE 50 MG/1
4 CAPSULE, EXTENDED RELEASE ORAL ONCE
Qty: 0 | Refills: 0 | Status: DISCONTINUED | OUTPATIENT
Start: 2018-05-08 | End: 2018-05-08

## 2018-05-08 RX ORDER — FAMOTIDINE 10 MG/ML
20 INJECTION INTRAVENOUS DAILY
Qty: 0 | Refills: 0 | Status: DISCONTINUED | OUTPATIENT
Start: 2018-05-08 | End: 2018-05-19

## 2018-05-08 RX ORDER — AMIODARONE HYDROCHLORIDE 400 MG/1
200 TABLET ORAL DAILY
Qty: 0 | Refills: 0 | Status: DISCONTINUED | OUTPATIENT
Start: 2018-05-08 | End: 2018-05-19

## 2018-05-08 RX ORDER — MAGNESIUM OXIDE 400 MG ORAL TABLET 241.3 MG
400 TABLET ORAL EVERY 4 HOURS
Qty: 0 | Refills: 0 | Status: COMPLETED | OUTPATIENT
Start: 2018-05-08 | End: 2018-05-08

## 2018-05-08 RX ORDER — APIXABAN 2.5 MG/1
2.5 TABLET, FILM COATED ORAL EVERY 12 HOURS
Qty: 0 | Refills: 0 | Status: DISCONTINUED | OUTPATIENT
Start: 2018-05-08 | End: 2018-05-11

## 2018-05-08 RX ORDER — BRIMONIDINE TARTRATE 2 MG/MG
1 SOLUTION/ DROPS OPHTHALMIC
Qty: 0 | Refills: 0 | Status: DISCONTINUED | OUTPATIENT
Start: 2018-05-08 | End: 2018-05-19

## 2018-05-08 RX ORDER — MIRTAZAPINE 45 MG/1
45 TABLET, ORALLY DISINTEGRATING ORAL AT BEDTIME
Qty: 0 | Refills: 0 | Status: DISCONTINUED | OUTPATIENT
Start: 2018-05-08 | End: 2018-05-19

## 2018-05-08 RX ORDER — DORZOLAMIDE HYDROCHLORIDE 20 MG/ML
1 SOLUTION/ DROPS OPHTHALMIC THREE TIMES A DAY
Qty: 0 | Refills: 0 | Status: DISCONTINUED | OUTPATIENT
Start: 2018-05-09 | End: 2018-05-19

## 2018-05-08 RX ORDER — BRINZOLAMIDE/BRIMONIDINE TARTRATE 10; 2 MG/ML; MG/ML
1 SUSPENSION/ DROPS OPHTHALMIC
Qty: 0 | Refills: 0 | COMMUNITY

## 2018-05-08 RX ORDER — DORZOLAMIDE HYDROCHLORIDE 20 MG/ML
SOLUTION/ DROPS OPHTHALMIC
Qty: 0 | Refills: 0 | Status: DISCONTINUED | OUTPATIENT
Start: 2018-05-09 | End: 2018-05-19

## 2018-05-08 RX ORDER — POTASSIUM CHLORIDE 20 MEQ
40 PACKET (EA) ORAL EVERY 4 HOURS
Qty: 0 | Refills: 0 | Status: COMPLETED | OUTPATIENT
Start: 2018-05-08 | End: 2018-05-08

## 2018-05-08 RX ORDER — LATANOPROST 0.05 MG/ML
1 SOLUTION/ DROPS OPHTHALMIC; TOPICAL AT BEDTIME
Qty: 0 | Refills: 0 | Status: DISCONTINUED | OUTPATIENT
Start: 2018-05-08 | End: 2018-05-19

## 2018-05-08 RX ORDER — CARVEDILOL PHOSPHATE 80 MG/1
6.25 CAPSULE, EXTENDED RELEASE ORAL EVERY 12 HOURS
Qty: 0 | Refills: 0 | Status: DISCONTINUED | OUTPATIENT
Start: 2018-05-08 | End: 2018-05-10

## 2018-05-08 RX ORDER — RANITIDINE HYDROCHLORIDE 150 MG/1
1 TABLET, FILM COATED ORAL
Qty: 0 | Refills: 0 | COMMUNITY

## 2018-05-08 RX ORDER — ISOSORBIDE MONONITRATE 60 MG/1
60 TABLET, EXTENDED RELEASE ORAL DAILY
Qty: 0 | Refills: 0 | Status: DISCONTINUED | OUTPATIENT
Start: 2018-05-08 | End: 2018-05-09

## 2018-05-08 RX ADMIN — MORPHINE SULFATE 4 MILLIGRAM(S): 50 CAPSULE, EXTENDED RELEASE ORAL at 15:57

## 2018-05-08 RX ADMIN — MAGNESIUM OXIDE 400 MG ORAL TABLET 400 MILLIGRAM(S): 241.3 TABLET ORAL at 20:49

## 2018-05-08 RX ADMIN — MORPHINE SULFATE 4 MILLIGRAM(S): 50 CAPSULE, EXTENDED RELEASE ORAL at 17:14

## 2018-05-08 RX ADMIN — Medication 40 MILLIEQUIVALENT(S): at 20:49

## 2018-05-08 RX ADMIN — Medication 40 MILLIGRAM(S): at 17:46

## 2018-05-08 NOTE — ED PROVIDER NOTE - PMH
Atrial fibrillation    CHF (congestive heart failure)  With EF of 20%  CVA (cerebral vascular accident)  in 2013 with right sided hemiparesis and dysarthria  Depression    Hypotension    Prostate cancer  s/p seed implants 2010  Temporal arteritis  in 2013

## 2018-05-08 NOTE — ED PROVIDER NOTE - MEDICAL DECISION MAKING DETAILS
EKG, CXR, CT abd, labs including cardiac enzyme and pro-bnp Farida: EKG, CXR, CT abd, labs including cardiac enzyme and pro-bnp

## 2018-05-08 NOTE — H&P ADULT - PROBLEM SELECTOR PLAN 1
CM  F/U CE, EKG, TTE, I's, O's, daily weights, lytes, PT, Dietary consults.  Give O2. Lasix 40 mg IV BID, Zaroxolyn, Amiodarone, ACE, BB.  Low salt diet. Monitor on telemetry   F/U CE, EKG, TTE, Monitor I/O's, daily weights, lytes,   PT consult, Nutrition consult  Continue  O2. Lasix 40 mg IV BID, Zaroxolyn, Amiodarone, ACE, BB.  Low salt diet.

## 2018-05-08 NOTE — ED ADULT TRIAGE NOTE - CHIEF COMPLAINT QUOTE
Pt complaining of all over body pain, back, abdominal and decrease urination as per wife. Pts PMD told wife to bring him to ED. Pt denies SOB, N/V/D, fever or chills.

## 2018-05-08 NOTE — ED PROVIDER NOTE - CARE PLAN
Principal Discharge DX:	CHF exacerbation  Secondary Diagnosis:	CHF (congestive heart failure)  Secondary Diagnosis:	Atrial fibrillation

## 2018-05-08 NOTE — ED PROVIDER NOTE - OBJECTIVE STATEMENT
72M CVA in 2013 with residual right sided hemiparesis, expressive aphasia, dysarthria, A Fib, PPM, CHF EF 14%, HTN, prostate CA s/p seed implants in 2010, temporal arteritis, 74 yo male with PMH CVA in 2013 with residual right sided hemiparesis, expressive aphasia, dysarthria, A Fib, PPM, CHF EF 14%, HTN, prostate CA s/p seed implants in 2010, temporal arteritis, presents to the ED sent in by his PMD for whole body pain, anasarca, concern for CHF exacerbation vs. urinary retention (pt's wife report he is not urinating as much lately). Patient endorses chest back abdominal and extremity pain worsened with movement. Pt is aphasic at baseline and has difficulty communicating consequently. Wife does not believe he has had fever, syncope, rigors, diarrhea, constipation, dysuria, hematuria, nausea, vomiting, SOB, cough.

## 2018-05-08 NOTE — H&P ADULT - RS GEN PE MLT RESP DETAILS PC
no chest wall tenderness/good air movement/no subcutaneous emphysema/clear to auscultation bilaterally/no intercostal retractions/no rales/respirations non-labored/no rhonchi/no wheezes/breath sounds equal

## 2018-05-08 NOTE — H&P ADULT - NSHPLABSRESULTS_GEN_ALL_CORE
H *&H = 10.5/ 33.0  K= 3.4. Supplemented.  BUN/ Creatinine= 24/ 1.11  Glucose = 96  CK= 92  MB= 2.67  Trop 0.15  BNP = 13,304  EKG V paced @ 105b/ min , QT/ QTC= 312/ 412  CT ABD & Pelvis = Small to moderate ascites. Moderate right. pleural effusion with adjacent compressive atelectasis of the right lung.  CXR = Small layering right pleural effusion associated with passive atelectasis. .  LABS:                         12.1   8.93  )-----------( 264      ( 09 May 2018 06:05 )             38.3     05-08    140  |  101  |  24<H>  ----------------------------<  96  3.4<L>   |  26  |  1.11    Ca    9.0      08 May 2018 14:43    TPro  6.6  /  Alb  3.1<L>  /  TBili  1.4<H>  /  DBili  x   /  AST  20  /  ALT  15  /  AlkPhos  138<H>  05-08        CARDIAC MARKERS ( 08 May 2018 23:30 )  x     / 0.20 ng/mL / 83 u/L / 2.75 ng/mL / x      CARDIAC MARKERS ( 08 May 2018 14:43 )  x     / 0.15 ng/mL / 93 u/L / 2.67 ng/mL / x          RADIOLOGY, EKG & ADDITIONAL TESTS: Reviewed.     EKG reviewed personally: V paced @ 105b/ min , QT/ QTC= 312/ 412  CT ABD & Pelvis = Small to moderate ascites. Moderate right. pleural effusion with adjacent compressive atelectasis of the right lung.  CXR = Small layering right pleural effusion associated with passive atelectasis.

## 2018-05-09 DIAGNOSIS — R69 ILLNESS, UNSPECIFIED: ICD-10-CM

## 2018-05-09 LAB
APPEARANCE UR: CLEAR — SIGNIFICANT CHANGE UP
BILIRUB UR-MCNC: NEGATIVE — SIGNIFICANT CHANGE UP
BLOOD UR QL VISUAL: HIGH
BUN SERPL-MCNC: 23 MG/DL — SIGNIFICANT CHANGE UP (ref 7–23)
CALCIUM SERPL-MCNC: 9.4 MG/DL — SIGNIFICANT CHANGE UP (ref 8.4–10.5)
CHLORIDE SERPL-SCNC: 100 MMOL/L — SIGNIFICANT CHANGE UP (ref 98–107)
CHOLEST SERPL-MCNC: 123 MG/DL — SIGNIFICANT CHANGE UP (ref 120–199)
CK MB BLD-MCNC: 2.75 NG/ML — SIGNIFICANT CHANGE UP (ref 1–6.6)
CK SERPL-CCNC: 83 U/L — SIGNIFICANT CHANGE UP (ref 30–200)
CO2 SERPL-SCNC: 23 MMOL/L — SIGNIFICANT CHANGE UP (ref 22–31)
COLOR SPEC: YELLOW — SIGNIFICANT CHANGE UP
CREAT SERPL-MCNC: 1.13 MG/DL — SIGNIFICANT CHANGE UP (ref 0.5–1.3)
GLUCOSE SERPL-MCNC: 84 MG/DL — SIGNIFICANT CHANGE UP (ref 70–99)
GLUCOSE UR-MCNC: NEGATIVE — SIGNIFICANT CHANGE UP
HBA1C BLD-MCNC: 6.4 % — HIGH (ref 4–5.6)
HCT VFR BLD CALC: 38.3 % — LOW (ref 39–50)
HDLC SERPL-MCNC: 45 MG/DL — SIGNIFICANT CHANGE UP (ref 35–55)
HGB BLD-MCNC: 12.1 G/DL — LOW (ref 13–17)
KETONES UR-MCNC: NEGATIVE — SIGNIFICANT CHANGE UP
LEUKOCYTE ESTERASE UR-ACNC: SIGNIFICANT CHANGE UP
LIPID PNL WITH DIRECT LDL SERPL: 76 MG/DL — SIGNIFICANT CHANGE UP
MAGNESIUM SERPL-MCNC: 2.2 MG/DL — SIGNIFICANT CHANGE UP (ref 1.6–2.6)
MCHC RBC-ENTMCNC: 31.6 % — LOW (ref 32–36)
MCHC RBC-ENTMCNC: 32.4 PG — SIGNIFICANT CHANGE UP (ref 27–34)
MCV RBC AUTO: 102.7 FL — HIGH (ref 80–100)
MUCOUS THREADS # UR AUTO: SIGNIFICANT CHANGE UP
NITRITE UR-MCNC: NEGATIVE — SIGNIFICANT CHANGE UP
NRBC # FLD: 0.02 — SIGNIFICANT CHANGE UP
PH UR: 6.5 — SIGNIFICANT CHANGE UP (ref 4.6–8)
PHOSPHATE SERPL-MCNC: 2.9 MG/DL — SIGNIFICANT CHANGE UP (ref 2.5–4.5)
PLATELET # BLD AUTO: 264 K/UL — SIGNIFICANT CHANGE UP (ref 150–400)
PMV BLD: 10.6 FL — SIGNIFICANT CHANGE UP (ref 7–13)
POTASSIUM SERPL-MCNC: 4.2 MMOL/L — SIGNIFICANT CHANGE UP (ref 3.5–5.3)
POTASSIUM SERPL-SCNC: 4.2 MMOL/L — SIGNIFICANT CHANGE UP (ref 3.5–5.3)
PROT UR-MCNC: 100 MG/DL — SIGNIFICANT CHANGE UP
RBC # BLD: 3.73 M/UL — LOW (ref 4.2–5.8)
RBC # FLD: 18.5 % — HIGH (ref 10.3–14.5)
RBC CASTS # UR COMP ASSIST: >50 — HIGH (ref 0–?)
SODIUM SERPL-SCNC: 138 MMOL/L — SIGNIFICANT CHANGE UP (ref 135–145)
SP GR SPEC: > 1.04 — HIGH (ref 1–1.04)
SQUAMOUS # UR AUTO: SIGNIFICANT CHANGE UP
TRIGL SERPL-MCNC: 58 MG/DL — SIGNIFICANT CHANGE UP (ref 10–149)
TROPONIN T SERPL-MCNC: 0.2 NG/ML — HIGH (ref 0–0.06)
TSH SERPL-MCNC: 5.55 UIU/ML — HIGH (ref 0.27–4.2)
UROBILINOGEN FLD QL: 4 MG/DL — HIGH
WBC # BLD: 8.93 K/UL — SIGNIFICANT CHANGE UP (ref 3.8–10.5)
WBC # FLD AUTO: 8.93 K/UL — SIGNIFICANT CHANGE UP (ref 3.8–10.5)
WBC CLUMPS #/AREA URNS HPF: PRESENT — HIGH (ref 0–?)
WBC UR QL: HIGH (ref 0–?)

## 2018-05-09 PROCEDURE — 99223 1ST HOSP IP/OBS HIGH 75: CPT

## 2018-05-09 PROCEDURE — 99233 SBSQ HOSP IP/OBS HIGH 50: CPT

## 2018-05-09 RX ORDER — ATORVASTATIN CALCIUM 80 MG/1
20 TABLET, FILM COATED ORAL AT BEDTIME
Qty: 0 | Refills: 0 | Status: DISCONTINUED | OUTPATIENT
Start: 2018-05-09 | End: 2018-05-19

## 2018-05-09 RX ORDER — FUROSEMIDE 40 MG
40 TABLET ORAL EVERY 8 HOURS
Qty: 0 | Refills: 0 | Status: DISCONTINUED | OUTPATIENT
Start: 2018-05-09 | End: 2018-05-10

## 2018-05-09 RX ADMIN — DORZOLAMIDE HYDROCHLORIDE 1 DROP(S): 20 SOLUTION/ DROPS OPHTHALMIC at 06:25

## 2018-05-09 RX ADMIN — Medication 40 MILLIGRAM(S): at 06:25

## 2018-05-09 RX ADMIN — CARVEDILOL PHOSPHATE 6.25 MILLIGRAM(S): 80 CAPSULE, EXTENDED RELEASE ORAL at 06:25

## 2018-05-09 RX ADMIN — Medication 40 MILLIGRAM(S): at 22:53

## 2018-05-09 RX ADMIN — SODIUM CHLORIDE 3 MILLILITER(S): 9 INJECTION INTRAMUSCULAR; INTRAVENOUS; SUBCUTANEOUS at 06:25

## 2018-05-09 RX ADMIN — DORZOLAMIDE HYDROCHLORIDE 1 DROP(S): 20 SOLUTION/ DROPS OPHTHALMIC at 00:31

## 2018-05-09 RX ADMIN — LISINOPRIL 10 MILLIGRAM(S): 2.5 TABLET ORAL at 06:25

## 2018-05-09 RX ADMIN — Medication 40 MILLIEQUIVALENT(S): at 00:31

## 2018-05-09 RX ADMIN — BRIMONIDINE TARTRATE 1 DROP(S): 2 SOLUTION/ DROPS OPHTHALMIC at 06:25

## 2018-05-09 RX ADMIN — BRIMONIDINE TARTRATE 1 DROP(S): 2 SOLUTION/ DROPS OPHTHALMIC at 22:36

## 2018-05-09 RX ADMIN — APIXABAN 2.5 MILLIGRAM(S): 2.5 TABLET, FILM COATED ORAL at 22:36

## 2018-05-09 RX ADMIN — DORZOLAMIDE HYDROCHLORIDE 1 DROP(S): 20 SOLUTION/ DROPS OPHTHALMIC at 14:13

## 2018-05-09 RX ADMIN — DORZOLAMIDE HYDROCHLORIDE 1 DROP(S): 20 SOLUTION/ DROPS OPHTHALMIC at 22:53

## 2018-05-09 RX ADMIN — SODIUM CHLORIDE 3 MILLILITER(S): 9 INJECTION INTRAMUSCULAR; INTRAVENOUS; SUBCUTANEOUS at 22:54

## 2018-05-09 RX ADMIN — MIRTAZAPINE 45 MILLIGRAM(S): 45 TABLET, ORALLY DISINTEGRATING ORAL at 00:31

## 2018-05-09 RX ADMIN — SODIUM CHLORIDE 3 MILLILITER(S): 9 INJECTION INTRAMUSCULAR; INTRAVENOUS; SUBCUTANEOUS at 00:31

## 2018-05-09 RX ADMIN — MAGNESIUM OXIDE 400 MG ORAL TABLET 400 MILLIGRAM(S): 241.3 TABLET ORAL at 00:31

## 2018-05-09 RX ADMIN — AMIODARONE HYDROCHLORIDE 200 MILLIGRAM(S): 400 TABLET ORAL at 06:25

## 2018-05-09 RX ADMIN — LATANOPROST 1 DROP(S): 0.05 SOLUTION/ DROPS OPHTHALMIC; TOPICAL at 00:31

## 2018-05-09 RX ADMIN — FAMOTIDINE 20 MILLIGRAM(S): 10 INJECTION INTRAVENOUS at 13:04

## 2018-05-09 RX ADMIN — SODIUM CHLORIDE 3 MILLILITER(S): 9 INJECTION INTRAMUSCULAR; INTRAVENOUS; SUBCUTANEOUS at 13:14

## 2018-05-09 RX ADMIN — APIXABAN 2.5 MILLIGRAM(S): 2.5 TABLET, FILM COATED ORAL at 06:25

## 2018-05-09 RX ADMIN — LATANOPROST 1 DROP(S): 0.05 SOLUTION/ DROPS OPHTHALMIC; TOPICAL at 22:54

## 2018-05-09 NOTE — PHYSICAL THERAPY INITIAL EVALUATION ADULT - CRITERIA FOR SKILLED THERAPEUTIC INTERVENTIONS
rehab potential/predicted duration of therapy intervention/anticipated discharge recommendation/impairments found/therapy frequency/risk reduction/prevention

## 2018-05-09 NOTE — PROGRESS NOTE ADULT - PROBLEM SELECTOR PLAN 2
- CHADSVASC= 5  - Rate control with BB and Amiodarone, currently rate controlled   - Anticoagulate with Eliquis

## 2018-05-09 NOTE — PROGRESS NOTE ADULT - PROBLEM SELECTOR PLAN 1
- Repeat TTE pending  - Pt with mild troponinemia, likely in the setting of acute CHF exacerbation   - Strict I/Os, would d/c Carolina for TOV.  Daily weights (unable to do standing given R sided hemiparesis)   - Will resume IV Lasix 40mg BID, Zaroxolyn, ACE, BB.  - Low salt diet  - HF team consulted

## 2018-05-09 NOTE — PHYSICAL THERAPY INITIAL EVALUATION ADULT - ADDITIONAL COMMENTS
Patient with expressive aphasia however able to respond to yes/no questions. Pt. lives in a house with son. Has HHA everyday. Per documentation, pt. ambulates with a cane, lately has used wheelchair.     Pt. was left supine in stretcher post PT Evaluation, NAD, all lines intact. RN aware of pt. participation in PT.

## 2018-05-09 NOTE — PHYSICAL THERAPY INITIAL EVALUATION ADULT - GENERAL OBSERVATIONS, REHAB EVAL
Consult received, chart reviewed. Patient received supine in stretcher, NAD, +cardiac monitor, +gomes. Patient agreed to Evaluation from Physical Therapist.

## 2018-05-09 NOTE — PHYSICAL THERAPY INITIAL EVALUATION ADULT - PERTINENT HX OF CURRENT PROBLEM, REHAB EVAL
Pt. admitted for swelling. Acute on chronic CHF. 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

## 2018-05-09 NOTE — PROGRESS NOTE ADULT - SUBJECTIVE AND OBJECTIVE BOX
Patient is a 73y old  Male who presents with a chief complaint of Swelling (08 May 2018 20:22)      SUBJECTIVE / OVERNIGHT EVENTS: Unable to obtain complete ROS as pt with chronic expressive aphasia from stroke, able to answer yes/no.  Denies CP, says yes to shortness of breath, but found to be lying comfortably on back in stretcher.  Pt with significant LE edema, Carolina in place.       MEDICATIONS  (STANDING):  amiodarone    Tablet 200 milliGRAM(s) Oral daily  apixaban 2.5 milliGRAM(s) Oral every 12 hours  brimonidine 0.2% Ophthalmic Solution 1 Drop(s) Both EYES two times a day  carvedilol 6.25 milliGRAM(s) Oral every 12 hours  dorzolamide 2% Ophthalmic Solution      dorzolamide 2% Ophthalmic Solution 1 Drop(s) Both EYES three times a day  famotidine    Tablet 20 milliGRAM(s) Oral daily  furosemide   Injectable 40 milliGRAM(s) IV Push two times a day  isosorbide   mononitrate ER Tablet (IMDUR) 60 milliGRAM(s) Oral daily  latanoprost 0.005% Ophthalmic Solution 1 Drop(s) Both EYES at bedtime  lisinopril 10 milliGRAM(s) Oral daily  metolazone 2.5 milliGRAM(s) Oral <User Schedule>  mirtazapine 45 milliGRAM(s) Oral at bedtime  sodium chloride 0.9% lock flush 3 milliLiter(s) IV Push every 8 hours    MEDICATIONS  (PRN):      Vital Signs Last 24 Hrs  T(C): 36.4 (09 May 2018 11:26), Max: 37.1 (08 May 2018 12:54)  T(F): 97.5 (09 May 2018 11:26), Max: 98.7 (08 May 2018 12:54)  HR: 85 (09 May 2018 11:26) (61 - 99)  BP: 102/69 (09 May 2018 11:26) (99/60 - 129/93)  BP(mean): --  RR: 18 (09 May 2018 11:26) (16 - 18)  SpO2: 99% (09 May 2018 11:26) (96% - 100%)  CAPILLARY BLOOD GLUCOSE      PHYSICAL EXAM  GENERAL: NAD, well-developed, R side contracted  HEAD:  Atraumatic, Normocephalic  EYES: EOMI, PERRLA, conjunctiva and sclera clear  NECK: Supple, +JVD  CHEST/LUNG: Trace crackles in b/l bases, however suboptimal lung exam 2/2 pt positioning   HEART: Regular rate and rhythm; No murmurs, rubs, or gallops  ABDOMEN: Soft, Nontender, +distention, no M/R/G, Carolina in place  EXTREMITIES:  2-3+ pitting edema in b/l LE  SKIN: No rashes or lesions    LABS:                        12.1   8.93  )-----------( 264      ( 09 May 2018 06:05 )             38.3     05-09    138  |  100  |  23  ----------------------------<  84  4.2   |  23  |  1.13    Ca    9.4      09 May 2018 06:05  Phos  2.9     05-09  Mg     2.2     05-09    TPro  6.6  /  Alb  3.1<L>  /  TBili  1.4<H>  /  DBili  x   /  AST  20  /  ALT  15  /  AlkPhos  138<H>  05-08      CARDIAC MARKERS ( 08 May 2018 23:30 )  x     / 0.20 ng/mL / 83 u/L / 2.75 ng/mL / x      CARDIAC MARKERS ( 08 May 2018 14:43 )  x     / 0.15 ng/mL / 93 u/L / 2.67 ng/mL / x          Urinalysis Basic - ( 09 May 2018 06:04 )    Color: YELLOW / Appearance: CLEAR / SG: > 1.040 / pH: 6.5  Gluc: NEGATIVE / Ketone: NEGATIVE  / Bili: NEGATIVE / Urobili: 4 mg/dL   Blood: LARGE / Protein: 100 mg/dL / Nitrite: NEGATIVE   Leuk Esterase: TRACE / RBC: >50 / WBC 5-10   Sq Epi: OCC / Non Sq Epi: x / Bacteria: x    Serum Pro-Brain Natriuretic Peptide (05.08.18 @ 14:43)    Serum Pro-Brain Natriuretic Peptide: 86174: ACUTE CONGESTIVE HEART FAILURE is UNLIKELY if NT-proBNP is < 300 pg/mL.    CONSIDER ACUTE CONGESTIVE HEART FAILURE if:    AGE                NT-proBNP RESULT  ---                -------------  < 50 YEARS      >  450 pg/mL  50 - 75 YEARS      >  900 pg/mL  > 75 YEARS      > 1800 pg/mL    All results require clinical correlation.  Consider obtaining a  baseline or "dry" NT-proBNP level when the patient is stabilized,  so that subsequent levels can be compared to that value.  Patients  with recurrent CHF may have elevated NT-proBNP levels.  Acute  failure episodes generally produce levels at least 25% greater  than baseline levels.  The above values are derived from a large  multi-center international study, "Topher, NIKKI, et al, European  Heart Journal, 2006; 27:330-337."   pg/mL        RADIOLOGY & ADDITIONAL TESTS:    Imaging Personally Reviewed:  < from: Xray Chest 1 View- PORTABLE-Urgent (05.08.18 @ 15:21) >  IMPRESSION:    Small layering right pleural effusion associated with passive atelectasis.          < end of copied text >      < from: CT Abdomen and Pelvis w/ IV Cont (05.08.18 @ 18:44) >  IMPRESSION:    Passive congestion of the liver from cardiomyopathy.   Evidence of third spacing of fluid.    < end of copied text >      Care Discussed with Consultants/Other Providers: HF Dr. Iqbal

## 2018-05-09 NOTE — CONSULT NOTE ADULT - SUBJECTIVE AND OBJECTIVE BOX
Patient is a 73y old  Male who presents with a chief complaint of Swelling (08 May 2018 20:22)    HPI:  Briefly, 72 y/o M w/ h/o HFrEF (EF 15%, LVEDD 3.3 cm), prior CVA  w/ residual expressive aphasia and R hemiparesis, afib (on Eliquis), prostate CA s/p brachytherapy, ? temporal arteritis who presented on  referred by primary care for b/l LE edema, ascites in setting of apparent dietary indiscretion. Was given IV lasix with good response. Currently without complaints. Had CT A/P which showed moderate R pleural effusion, moderate ascites and anasarca. Labs notable for BNP 13k (from 7k 3/14), troponin T 0.3, lactate 2.2, BUN/Cr 23/1.13 (improved from 1.56 in 3/18). Unable to express any complaints.     PAST MEDICAL & SURGICAL HISTORY:  Depression  Prostate cancer: s/p seed implants   Hypotension  Temporal arteritis: in   CVA (cerebral vascular accident): in  with right sided hemiparesis and dysarthria  CHF (congestive heart failure): With EF of 20%  Atrial fibrillation  History of hip replacement: left hip replacement       FAMILY HISTORY:  Family history of cancer (Sibling)    Medications:  amiodarone    Tablet 200 milliGRAM(s) Oral daily  apixaban 2.5 milliGRAM(s) Oral every 12 hours  atorvastatin 20 milliGRAM(s) Oral at bedtime  brimonidine 0.2% Ophthalmic Solution 1 Drop(s) Both EYES two times a day  carvedilol 6.25 milliGRAM(s) Oral every 12 hours  dorzolamide 2% Ophthalmic Solution      dorzolamide 2% Ophthalmic Solution 1 Drop(s) Both EYES three times a day  famotidine    Tablet 20 milliGRAM(s) Oral daily  furosemide   Injectable 40 milliGRAM(s) IV Push every 8 hours  latanoprost 0.005% Ophthalmic Solution 1 Drop(s) Both EYES at bedtime  lisinopril 10 milliGRAM(s) Oral daily  metolazone 2.5 milliGRAM(s) Oral <User Schedule>  mirtazapine 45 milliGRAM(s) Oral at bedtime  sodium chloride 0.9% lock flush 3 milliLiter(s) IV Push every 8 hours      Vitals:  T(C): 36.2 (18 @ 20:16), Max: 37.1 (18 @ 06:23)  HR: 72 (18 @ 20:16) (72 - 98)  BP: 110/72 (18 @ 20:16) (93/65 - 129/93)  RR: 16 (18 @ 20:16) (15 - 18)  SpO2: 100% (18 @ 20:16) (96% - 100%)    Daily Height in cm: 182.88 (08 May 2018 20:22)    Daily     I&O's Summary    09 May 2018 07:01  -  09 May 2018 20:18  --------------------------------------------------------  IN: 0 mL / OUT: 700 mL / NET: -700 mL    Physical Exam:  Appearance: No Acute Distress  HEENT: JVD approx 14 cm  Cardiovascular: Normal S1 S2, No murmurs/rubs/gallops  Respiratory: Clear to auscultation bilaterally  Gastrointestinal: distended, nontender	  Skin: No cyanosis	  Neurologic: Non-focal  Extremities: 2+ LE edema b/l; slightly cool to palpation in feet  Psychiatry: A & O x 3, Mood & affect appropriate    Labs:                        12.1   8.93  )-----------( 264      ( 09 May 2018 06:05 )             38.3     -    138  |  100  |  23  ----------------------------<  84  4.2   |  23  |  1.13    Ca    9.4      09 May 2018 06:05  Phos  2.9     05-  Mg     2.2     05-09    TPro  6.6  /  Alb  3.1<L>  /  TBili  1.4<H>  /  DBili  x   /  AST  20  /  ALT  15  /  AlkPhos  138<H>  05-08      CARDIAC MARKERS ( 08 May 2018 23:30 )  x     / 0.20 ng/mL / 83 u/L / 2.75 ng/mL / x      CARDIAC MARKERS ( 08 May 2018 14:43 )  x     / 0.15 ng/mL / 93 u/L / 2.67 ng/mL / x          Serum Pro-Brain Natriuretic Peptide: 66026 pg/mL (05-08 @ 14:43)    Total Cholesterol: 123  LDL: 76  HDL: 45  T    Echocardiogram:    EKG:  A-sensed, BiV paced

## 2018-05-10 DIAGNOSIS — I50.23 ACUTE ON CHRONIC SYSTOLIC (CONGESTIVE) HEART FAILURE: ICD-10-CM

## 2018-05-10 LAB
BUN SERPL-MCNC: 24 MG/DL — HIGH (ref 7–23)
CALCIUM SERPL-MCNC: 8.8 MG/DL — SIGNIFICANT CHANGE UP (ref 8.4–10.5)
CHLORIDE SERPL-SCNC: 99 MMOL/L — SIGNIFICANT CHANGE UP (ref 98–107)
CO2 SERPL-SCNC: 23 MMOL/L — SIGNIFICANT CHANGE UP (ref 22–31)
CREAT SERPL-MCNC: 1.22 MG/DL — SIGNIFICANT CHANGE UP (ref 0.5–1.3)
GLUCOSE SERPL-MCNC: 69 MG/DL — LOW (ref 70–99)
HCT VFR BLD CALC: 31 % — LOW (ref 39–50)
HGB BLD-MCNC: 10.1 G/DL — LOW (ref 13–17)
KAPPA FREE LIGHT CHAINS, SERUM: 4.18 MG/DL — HIGH (ref 0.33–1.94)
LAMBDA FREE LIGHT CHAINS, SERUM: 10.5 MG/DL — HIGH (ref 0.57–2.63)
MAGNESIUM SERPL-MCNC: 2 MG/DL — SIGNIFICANT CHANGE UP (ref 1.6–2.6)
MCHC RBC-ENTMCNC: 32.6 % — SIGNIFICANT CHANGE UP (ref 32–36)
MCHC RBC-ENTMCNC: 33.1 PG — SIGNIFICANT CHANGE UP (ref 27–34)
MCV RBC AUTO: 101.6 FL — HIGH (ref 80–100)
NRBC # FLD: 0.03 — SIGNIFICANT CHANGE UP
NT-PROBNP SERPL-SCNC: SIGNIFICANT CHANGE UP PG/ML
PLATELET # BLD AUTO: 226 K/UL — SIGNIFICANT CHANGE UP (ref 150–400)
PMV BLD: 10.6 FL — SIGNIFICANT CHANGE UP (ref 7–13)
POTASSIUM SERPL-MCNC: 4.2 MMOL/L — SIGNIFICANT CHANGE UP (ref 3.5–5.3)
POTASSIUM SERPL-SCNC: 4.2 MMOL/L — SIGNIFICANT CHANGE UP (ref 3.5–5.3)
RBC # BLD: 3.05 M/UL — LOW (ref 4.2–5.8)
RBC # FLD: 18.6 % — HIGH (ref 10.3–14.5)
SODIUM SERPL-SCNC: 138 MMOL/L — SIGNIFICANT CHANGE UP (ref 135–145)
SPECIMEN SOURCE: SIGNIFICANT CHANGE UP
WBC # BLD: 9.05 K/UL — SIGNIFICANT CHANGE UP (ref 3.8–10.5)
WBC # FLD AUTO: 9.05 K/UL — SIGNIFICANT CHANGE UP (ref 3.8–10.5)

## 2018-05-10 PROCEDURE — 99233 SBSQ HOSP IP/OBS HIGH 50: CPT

## 2018-05-10 PROCEDURE — 86334 IMMUNOFIX E-PHORESIS SERUM: CPT | Mod: 26

## 2018-05-10 PROCEDURE — 86335 IMMUNFIX E-PHORSIS/URINE/CSF: CPT | Mod: 26

## 2018-05-10 RX ORDER — FUROSEMIDE 40 MG
80 TABLET ORAL
Qty: 0 | Refills: 0 | Status: DISCONTINUED | OUTPATIENT
Start: 2018-05-10 | End: 2018-05-13

## 2018-05-10 RX ORDER — LISINOPRIL 2.5 MG/1
5 TABLET ORAL DAILY
Qty: 0 | Refills: 0 | Status: DISCONTINUED | OUTPATIENT
Start: 2018-05-10 | End: 2018-05-15

## 2018-05-10 RX ORDER — FUROSEMIDE 40 MG
80 TABLET ORAL
Qty: 0 | Refills: 0 | Status: DISCONTINUED | OUTPATIENT
Start: 2018-05-10 | End: 2018-05-10

## 2018-05-10 RX ORDER — METOPROLOL TARTRATE 50 MG
12.5 TABLET ORAL
Qty: 0 | Refills: 0 | Status: DISCONTINUED | OUTPATIENT
Start: 2018-05-10 | End: 2018-05-13

## 2018-05-10 RX ADMIN — ATORVASTATIN CALCIUM 20 MILLIGRAM(S): 80 TABLET, FILM COATED ORAL at 22:12

## 2018-05-10 RX ADMIN — Medication 80 MILLIGRAM(S): at 17:28

## 2018-05-10 RX ADMIN — BRIMONIDINE TARTRATE 1 DROP(S): 2 SOLUTION/ DROPS OPHTHALMIC at 17:30

## 2018-05-10 RX ADMIN — SODIUM CHLORIDE 3 MILLILITER(S): 9 INJECTION INTRAMUSCULAR; INTRAVENOUS; SUBCUTANEOUS at 22:13

## 2018-05-10 RX ADMIN — BRIMONIDINE TARTRATE 1 DROP(S): 2 SOLUTION/ DROPS OPHTHALMIC at 05:30

## 2018-05-10 RX ADMIN — AMIODARONE HYDROCHLORIDE 200 MILLIGRAM(S): 400 TABLET ORAL at 05:28

## 2018-05-10 RX ADMIN — APIXABAN 2.5 MILLIGRAM(S): 2.5 TABLET, FILM COATED ORAL at 05:57

## 2018-05-10 RX ADMIN — Medication 12.5 MILLIGRAM(S): at 17:32

## 2018-05-10 RX ADMIN — DORZOLAMIDE HYDROCHLORIDE 1 DROP(S): 20 SOLUTION/ DROPS OPHTHALMIC at 05:30

## 2018-05-10 RX ADMIN — APIXABAN 2.5 MILLIGRAM(S): 2.5 TABLET, FILM COATED ORAL at 17:29

## 2018-05-10 RX ADMIN — Medication 40 MILLIGRAM(S): at 05:28

## 2018-05-10 RX ADMIN — FAMOTIDINE 20 MILLIGRAM(S): 10 INJECTION INTRAVENOUS at 13:33

## 2018-05-10 RX ADMIN — LATANOPROST 1 DROP(S): 0.05 SOLUTION/ DROPS OPHTHALMIC; TOPICAL at 22:12

## 2018-05-10 RX ADMIN — DORZOLAMIDE HYDROCHLORIDE 1 DROP(S): 20 SOLUTION/ DROPS OPHTHALMIC at 13:33

## 2018-05-10 RX ADMIN — SODIUM CHLORIDE 3 MILLILITER(S): 9 INJECTION INTRAMUSCULAR; INTRAVENOUS; SUBCUTANEOUS at 15:50

## 2018-05-10 RX ADMIN — DORZOLAMIDE HYDROCHLORIDE 1 DROP(S): 20 SOLUTION/ DROPS OPHTHALMIC at 22:12

## 2018-05-10 RX ADMIN — LISINOPRIL 10 MILLIGRAM(S): 2.5 TABLET ORAL at 05:28

## 2018-05-10 RX ADMIN — MIRTAZAPINE 45 MILLIGRAM(S): 45 TABLET, ORALLY DISINTEGRATING ORAL at 22:12

## 2018-05-10 RX ADMIN — SODIUM CHLORIDE 3 MILLILITER(S): 9 INJECTION INTRAMUSCULAR; INTRAVENOUS; SUBCUTANEOUS at 05:22

## 2018-05-10 NOTE — PROGRESS NOTE ADULT - PROBLEM SELECTOR PLAN 1
-Moderately volume overloaded. Diuresed 750 ml, net neg 440 ml today.   -We increased Lasix to 80 mg IV q 8 hours. Continue metolazone 2.5 mg po qd.  -Continue Coreg 6.25 mg po BID, lisinopril 10 mg po qd.  -Will consider Aldactone tomorrow. -Moderately volume overloaded. Diuresed 750 ml, net neg 440 ml today.   -We increased Lasix to 80 mg IV q 8 hours. Continue metolazone 2.5 mg po qd on Tuesday, Thursday, Saturday.  -Discontinue Coreg 6.25 (pt is hypotensive, also likely has amyloid), started lopressor 12.5 mg po BID (for a.fib rate control). Decreased lisinopril to 5 mg po qd also b/c of hypotension.

## 2018-05-10 NOTE — PROGRESS NOTE ADULT - PROBLEM SELECTOR PLAN 1
- Repeat TTE pending  - Strict I/Os.  Daily weights (unable to do standing given R sided hemiparesis)   - Will continue IV Lasix 40mg BID, Zaroxolyn, ACE, BB.  - Low salt diet  - HF team consulted

## 2018-05-10 NOTE — DIETITIAN INITIAL EVALUATION ADULT. - OTHER INFO
Nutrition consult received for heart failure. Pt is a 73M with a past medical Hx inclusive of CVA, depression temporal arteritis. prostate Ca, HTN, Afib, CHF, currently admitted with acute on chronic HF exacerbation and AFib.  No GI distress (nausea/vomiting/diarrhea/constipation.) No reports of chewing or swallowing difficulties with regular diet.  Pt noted with speech and swallow evaluation on a prior admission, and mechanical soft diet with thin liquids was recommended (3/16).  Given patients cognition, diet education is not feasible at this time. RDN remains available.

## 2018-05-10 NOTE — DIETITIAN INITIAL EVALUATION ADULT. - NS AS NUTRI INTERV STRATEGIES
1- Continue current diet order, which remains appropriate at this time. 2- Monitor weights, labs, BM's, skin integrity, p.o. intake. 3- Please Encourage po intake, assist with meals and menu selections, provide alternatives PRN. 4- RD remains available, re-consult as needed. Sweta Mehta RD Pager #[phone]

## 2018-05-10 NOTE — PROGRESS NOTE ADULT - SUBJECTIVE AND OBJECTIVE BOX
Patient seen and examined. He is feeling well- no complaints. Resting comfortably in bed, lying flat.     Medications:  amiodarone    Tablet 200 milliGRAM(s) Oral daily  apixaban 2.5 milliGRAM(s) Oral every 12 hours  atorvastatin 20 milliGRAM(s) Oral at bedtime  brimonidine 0.2% Ophthalmic Solution 1 Drop(s) Both EYES two times a day  carvedilol 6.25 milliGRAM(s) Oral every 12 hours  dorzolamide 2% Ophthalmic Solution      dorzolamide 2% Ophthalmic Solution 1 Drop(s) Both EYES three times a day  famotidine    Tablet 20 milliGRAM(s) Oral daily  furosemide   Injectable 80 milliGRAM(s) IV Push two times a day  latanoprost 0.005% Ophthalmic Solution 1 Drop(s) Both EYES at bedtime  lisinopril 10 milliGRAM(s) Oral daily  metolazone 2.5 milliGRAM(s) Oral <User Schedule>  mirtazapine 45 milliGRAM(s) Oral at bedtime  sodium chloride 0.9% lock flush 3 milliLiter(s) IV Push every 8 hours      Vitals:  Vital Signs Last 24 Hrs  T(C): 36.9 (10 May 2018 13:23), Max: 36.9 (10 May 2018 13:23)  T(F): 98.5 (10 May 2018 13:23), Max: 98.5 (10 May 2018 13:23)  HR: 68 (10 May 2018 13:23) (67 - 72)  BP: 92/61 (10 May 2018 13:23) (90/60 - 110/72)  RR: 16 (10 May 2018 13:23) (16 - 16)  SpO2: 98% (10 May 2018 13:23) (98% - 100%)    Daily     Daily Weight in k.9 (10 May 2018 15:03)    I&O's Detail    09 May 2018 07:01  -  10 May 2018 07:00  --------------------------------------------------------  IN:    Oral Fluid: 250 mL  Total IN: 250 mL    OUT:    Indwelling Catheter - Urethral: 1200 mL  Total OUT: 1200 mL    Total NET: -950 mL      10 May 2018 07:01  -  10 May 2018 16:36  --------------------------------------------------------  IN:    Oral Fluid: 310 mL  Total IN: 310 mL    OUT:    Indwelling Catheter - Urethral: 750 mL  Total OUT: 750 mL    Total NET: -440 mL          Physical Exam:     General: No distress. Comfortable.  HEENT: EOM intact.  Neck: Neck supple. JVP mod-severely elevated. No masses  Chest: Clear to auscultation bilaterally  CV: Normal S1 and S2. No murmurs, rub, or gallops. Radial pulses normal. 2+ LE edema b/l.   Abdomen: Soft, non-distended, non-tender  Skin: No rashes or skin breakdown  Neurology: Alert and oriented times three. Sensation intact  Psych: Affect normal    Labs:                        10.1   9.05  )-----------( 226      ( 10 May 2018 08:00 )             31.0     05-10    138  |  99  |  24<H>  ----------------------------<  69<L>  4.2   |  23  |  1.22    Ca    8.8      10 May 2018 06:25  Phos  2.9     05-09  Mg     2.0     05-10        CARDIAC MARKERS ( 08 May 2018 23:30 )  x     / 0.20 ng/mL / 83 u/L / 2.75 ng/mL / x        < from: Transthoracic Echocardiogram (17 @ 11:10) >  DIMENSIONS:  Dimensions:     Normal Values:  LA:     4.8 cm    2.0 - 4.0 cm  Ao:     2.9 cm    2.0 - 3.8 cm  SEPTUM: 2.1 cm    0.6 - 1.2 cm  PWT:    2.0 cm  0.6 - 1.1 cm  LVIDd:  3.4 cm    3.0 - 5.6 cm  LVIDs:  3.2 cm    1.8 - 4.0 cm  Derived Variables:  LVMI: 193 g/m2  RWT: 1.17  Fractional short: 6 %  Ejection Fraction (Teicholtz): 14 %  ------------------------------------------------------------------------  OBSERVATIONS:  Mitral Valve: Tethered mitral valve leaflets with normal  opening. Mild-moderate mitral regurgitation.  Aortic Root: Normal aortic root.  Aortic Valve: Normal trileaflet aortic valve.  Left Atrium: Severely dilated left atrium.  LA volume index  = 57 cc/m2.  Left Ventricle: Severe global left ventricular systolic  dysfunction. Severe concentric left ventricular  hypertrophy.  Right Heart: Moderate right atrial enlargement. Normal  right ventricular size with decreased right ventricular  systolic function. A device wire is noted in the right  heart. Normal tricuspid valve. Mild tricuspid  regurgitation. Normal pulmonic valve.  Pericardium/PleuraNormal pericardium with no pericardial  effusion.  Hemodynamic: Estimated right ventricular systolic pressure  equals 41 mm Hg, assuming right atrial pressure equals 10  mm Hg, consistent with mild pulmonary hypertension.  ------------------------------------------------------------------------    < end of copied text >

## 2018-05-10 NOTE — PROGRESS NOTE ADULT - ATTENDING COMMENTS
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 temporal arteritis who presented on 5/8 referred by primary care for b/l LE edema, ascites in setting of apparent dietary indiscretion. Was put on lasix 40 mg IV q8h with only 1200 cc documented output. On exam, elevated JVD, NAD, RRR, no m/r/g, lungs clear, abdom distended, 2+ LE b/l. Labs reviewed - BUN/Cr 24/1.22 (from 1.56 3/18), Hb 10, Trop T 0.2. TTE 8/17 reviewed - severe concentric LVH (septum 1.7 cm, PW 1.7 cm), LVEDD 3.3 cm, EF 15%, biatrial enlargement, RV dysfunction, moderate MR/TR. 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  - change coreg to metoprolol 12.5 mg q8h  - increase lasix 80 mg IV every 8 hours  - keep K>4, Mg>2  - change Eliquis to 5 mg bid  - repeat TTE  - would do cardiac amyloid workup; serum free light chains, serum and urine immunofixation pending; will consider pyrophosphate study to evaluate for TTR

## 2018-05-10 NOTE — DIETITIAN INITIAL EVALUATION ADULT. - PROBLEM SELECTOR PLAN 1
Monitor on telemetry   F/U CE, EKG, TTE, Monitor I/O's, daily weights, lytes,   PT consult, Nutrition consult  Continue  O2. Lasix 40 mg IV BID, Zaroxolyn, Amiodarone, ACE, BB.  Low salt diet.

## 2018-05-10 NOTE — DIETITIAN INITIAL EVALUATION ADULT. - DIET TYPE
1000ml/DASH/TLC (sodium and cholesterol restricted diet)/consistent carbohydrate (no snacks)/regular

## 2018-05-10 NOTE — PROGRESS NOTE ADULT - SUBJECTIVE AND OBJECTIVE BOX
Patient is a 73y old  Male who presents with a chief complaint of Swelling (08 May 2018 20:22)        SUBJECTIVE / OVERNIGHT EVENTS: Pt denies complaint. Lying flat in bed without dyspnea.      MEDICATIONS  (STANDING):  amiodarone    Tablet 200 milliGRAM(s) Oral daily  apixaban 2.5 milliGRAM(s) Oral every 12 hours  atorvastatin 20 milliGRAM(s) Oral at bedtime  brimonidine 0.2% Ophthalmic Solution 1 Drop(s) Both EYES two times a day  carvedilol 6.25 milliGRAM(s) Oral every 12 hours  dorzolamide 2% Ophthalmic Solution      dorzolamide 2% Ophthalmic Solution 1 Drop(s) Both EYES three times a day  famotidine    Tablet 20 milliGRAM(s) Oral daily  furosemide   Injectable 40 milliGRAM(s) IV Push every 8 hours  latanoprost 0.005% Ophthalmic Solution 1 Drop(s) Both EYES at bedtime  lisinopril 10 milliGRAM(s) Oral daily  metolazone 2.5 milliGRAM(s) Oral <User Schedule>  mirtazapine 45 milliGRAM(s) Oral at bedtime  sodium chloride 0.9% lock flush 3 milliLiter(s) IV Push every 8 hours    MEDICATIONS  (PRN):      Vital Signs Last 24 Hrs  T(C): 36.6 (10 May 2018 05:19), Max: 36.8 (09 May 2018 21:07)  T(F): 97.8 (10 May 2018 05:19), Max: 98.3 (09 May 2018 21:07)  HR: 68 (10 May 2018 05:19) (67 - 94)  BP: 105/74 (10 May 2018 05:19) (90/60 - 110/72)  BP(mean): --  RR: 16 (10 May 2018 05:19) (15 - 16)  SpO2: 100% (10 May 2018 05:19) (98% - 100%)  CAPILLARY BLOOD GLUCOSE        I&O's Summary    09 May 2018 07:01  -  10 May 2018 07:00  --------------------------------------------------------  IN: 250 mL / OUT: 1200 mL / NET: -950 mL          PHYSICAL EXAM  GENERAL: thin older man, NAD  HEAD:  Atraumatic, Normocephalic  EYES: EOMI, PERRLA, conjunctiva and sclera clear  NECK: Supple, No JVD  CHEST/LUNG: Clear to auscultation bilaterally; No wheeze  HEART: Regular rate and rhythm; No murmurs, rubs, or gallops  ABDOMEN: Soft, Nontender, Nondistended; Bowel sounds present  EXTREMITIES:  2+ Peripheral Pulses, +b/l LE pitting edema      LABS:                        10.1   9.05  )-----------( 226      ( 10 May 2018 08:00 )             31.0     05-10    138  |  99  |  24<H>  ----------------------------<  69<L>  4.2   |  23  |  1.22    Ca    8.8      10 May 2018 06:25  Phos  2.9     05-09  Mg     2.0     05-10    TPro  6.6  /  Alb  3.1<L>  /  TBili  1.4<H>  /  DBili  x   /  AST  20  /  ALT  15  /  AlkPhos  138<H>  05-08      CARDIAC MARKERS ( 08 May 2018 23:30 )  x     / 0.20 ng/mL / 83 u/L / 2.75 ng/mL / x      CARDIAC MARKERS ( 08 May 2018 14:43 )  x     / 0.15 ng/mL / 93 u/L / 2.67 ng/mL / x          Urinalysis Basic - ( 09 May 2018 06:04 )    Color: YELLOW / Appearance: CLEAR / SG: > 1.040 / pH: 6.5  Gluc: NEGATIVE / Ketone: NEGATIVE  / Bili: NEGATIVE / Urobili: 4 mg/dL   Blood: LARGE / Protein: 100 mg/dL / Nitrite: NEGATIVE   Leuk Esterase: TRACE / RBC: >50 / WBC 5-10   Sq Epi: OCC / Non Sq Epi: x / Bacteria: x        Culture - Urine (collected 09 May 2018 06:44)  Source: URINE MIDSTREAM  Preliminary Report (10 May 2018 09:58):    GNRID^[id number] Neg Valentin To Be Identified    COLONY COUNT: 10,000-49,000 CFU/mL        RADIOLOGY & ADDITIONAL TESTS:    Imaging Personally Reviewed:  Consultant(s) Notes Reviewed:    Care Discussed with Consultants/Other Providers:

## 2018-05-10 NOTE — DIETITIAN INITIAL EVALUATION ADULT. - PERTINENT LABORATORY DATA
05-10 Na138 mmol/L Glu 69 mg/dL<L> K+ 4.2 mmol/L Cr  1.22 mg/dL BUN 24 mg/dL<H> 05-09 Phos 2.9 mg/dL 05-08 Alb 3.1 g/dL<L> 05-09 [id number] 6.4 %<H> 05-09 Chol 123 mg/dL LDL 76 mg/dL HDL 45 mg/dL Trig 58 mg/dL

## 2018-05-11 DIAGNOSIS — E03.9 HYPOTHYROIDISM, UNSPECIFIED: ICD-10-CM

## 2018-05-11 LAB
-  AMIKACIN: SIGNIFICANT CHANGE UP
-  AMPICILLIN/SULBACTAM: SIGNIFICANT CHANGE UP
-  AMPICILLIN: SIGNIFICANT CHANGE UP
-  AZTREONAM: SIGNIFICANT CHANGE UP
-  CEFAZOLIN: SIGNIFICANT CHANGE UP
-  CEFEPIME: SIGNIFICANT CHANGE UP
-  CEFOXITIN: SIGNIFICANT CHANGE UP
-  CEFTAZIDIME: SIGNIFICANT CHANGE UP
-  CEFTRIAXONE: SIGNIFICANT CHANGE UP
-  CIPROFLOXACIN: SIGNIFICANT CHANGE UP
-  ERTAPENEM: SIGNIFICANT CHANGE UP
-  GENTAMICIN: SIGNIFICANT CHANGE UP
-  LEVOFLOXACIN: SIGNIFICANT CHANGE UP
-  MEROPENEM: SIGNIFICANT CHANGE UP
-  NITROFURANTOIN: SIGNIFICANT CHANGE UP
-  PIPERACILLIN/TAZOBACTAM: SIGNIFICANT CHANGE UP
-  TOBRAMYCIN: SIGNIFICANT CHANGE UP
-  TRIMETHOPRIM/SULFAMETHOXAZOLE: SIGNIFICANT CHANGE UP
BACTERIA UR CULT: SIGNIFICANT CHANGE UP
BUN SERPL-MCNC: 28 MG/DL — HIGH (ref 7–23)
CALCIUM SERPL-MCNC: 9 MG/DL — SIGNIFICANT CHANGE UP (ref 8.4–10.5)
CHLORIDE SERPL-SCNC: 98 MMOL/L — SIGNIFICANT CHANGE UP (ref 98–107)
CO2 SERPL-SCNC: 29 MMOL/L — SIGNIFICANT CHANGE UP (ref 22–31)
CREAT SERPL-MCNC: 1.49 MG/DL — HIGH (ref 0.5–1.3)
GLUCOSE SERPL-MCNC: 86 MG/DL — SIGNIFICANT CHANGE UP (ref 70–99)
HCT VFR BLD CALC: 32.6 % — LOW (ref 39–50)
HGB BLD-MCNC: 10.7 G/DL — LOW (ref 13–17)
KAPPA/LAMBDA FREE LIGHT CHAIN RATIO, SERUM: 0.4 — SIGNIFICANT CHANGE UP
MCHC RBC-ENTMCNC: 32.8 % — SIGNIFICANT CHANGE UP (ref 32–36)
MCHC RBC-ENTMCNC: 33.1 PG — SIGNIFICANT CHANGE UP (ref 27–34)
MCV RBC AUTO: 100.9 FL — HIGH (ref 80–100)
METHOD TYPE: SIGNIFICANT CHANGE UP
NRBC # FLD: 0.03 — SIGNIFICANT CHANGE UP
ORGANISM # SPEC MICROSCOPIC CNT: SIGNIFICANT CHANGE UP
ORGANISM # SPEC MICROSCOPIC CNT: SIGNIFICANT CHANGE UP
PLATELET # BLD AUTO: 246 K/UL — SIGNIFICANT CHANGE UP (ref 150–400)
PMV BLD: 10.6 FL — SIGNIFICANT CHANGE UP (ref 7–13)
POTASSIUM SERPL-MCNC: 3.5 MMOL/L — SIGNIFICANT CHANGE UP (ref 3.5–5.3)
POTASSIUM SERPL-SCNC: 3.5 MMOL/L — SIGNIFICANT CHANGE UP (ref 3.5–5.3)
RBC # BLD: 3.23 M/UL — LOW (ref 4.2–5.8)
RBC # FLD: 18.2 % — HIGH (ref 10.3–14.5)
SODIUM SERPL-SCNC: 139 MMOL/L — SIGNIFICANT CHANGE UP (ref 135–145)
WBC # BLD: 9.78 K/UL — SIGNIFICANT CHANGE UP (ref 3.8–10.5)
WBC # FLD AUTO: 9.78 K/UL — SIGNIFICANT CHANGE UP (ref 3.8–10.5)

## 2018-05-11 PROCEDURE — 99233 SBSQ HOSP IP/OBS HIGH 50: CPT

## 2018-05-11 PROCEDURE — 93306 TTE W/DOPPLER COMPLETE: CPT | Mod: 26

## 2018-05-11 RX ORDER — APIXABAN 2.5 MG/1
5 TABLET, FILM COATED ORAL EVERY 12 HOURS
Qty: 0 | Refills: 0 | Status: DISCONTINUED | OUTPATIENT
Start: 2018-05-11 | End: 2018-05-15

## 2018-05-11 RX ORDER — POTASSIUM CHLORIDE 20 MEQ
20 PACKET (EA) ORAL ONCE
Qty: 0 | Refills: 0 | Status: COMPLETED | OUTPATIENT
Start: 2018-05-11 | End: 2018-05-11

## 2018-05-11 RX ADMIN — BRIMONIDINE TARTRATE 1 DROP(S): 2 SOLUTION/ DROPS OPHTHALMIC at 17:05

## 2018-05-11 RX ADMIN — DORZOLAMIDE HYDROCHLORIDE 1 DROP(S): 20 SOLUTION/ DROPS OPHTHALMIC at 06:25

## 2018-05-11 RX ADMIN — SODIUM CHLORIDE 3 MILLILITER(S): 9 INJECTION INTRAMUSCULAR; INTRAVENOUS; SUBCUTANEOUS at 14:07

## 2018-05-11 RX ADMIN — Medication 80 MILLIGRAM(S): at 06:46

## 2018-05-11 RX ADMIN — LATANOPROST 1 DROP(S): 0.05 SOLUTION/ DROPS OPHTHALMIC; TOPICAL at 22:08

## 2018-05-11 RX ADMIN — APIXABAN 5 MILLIGRAM(S): 2.5 TABLET, FILM COATED ORAL at 17:05

## 2018-05-11 RX ADMIN — ATORVASTATIN CALCIUM 20 MILLIGRAM(S): 80 TABLET, FILM COATED ORAL at 22:07

## 2018-05-11 RX ADMIN — Medication 12.5 MILLIGRAM(S): at 06:31

## 2018-05-11 RX ADMIN — DORZOLAMIDE HYDROCHLORIDE 1 DROP(S): 20 SOLUTION/ DROPS OPHTHALMIC at 22:08

## 2018-05-11 RX ADMIN — MIRTAZAPINE 45 MILLIGRAM(S): 45 TABLET, ORALLY DISINTEGRATING ORAL at 22:07

## 2018-05-11 RX ADMIN — Medication 12.5 MILLIGRAM(S): at 17:05

## 2018-05-11 RX ADMIN — AMIODARONE HYDROCHLORIDE 200 MILLIGRAM(S): 400 TABLET ORAL at 06:24

## 2018-05-11 RX ADMIN — LISINOPRIL 5 MILLIGRAM(S): 2.5 TABLET ORAL at 06:31

## 2018-05-11 RX ADMIN — BRIMONIDINE TARTRATE 1 DROP(S): 2 SOLUTION/ DROPS OPHTHALMIC at 06:25

## 2018-05-11 RX ADMIN — FAMOTIDINE 20 MILLIGRAM(S): 10 INJECTION INTRAVENOUS at 13:06

## 2018-05-11 RX ADMIN — SODIUM CHLORIDE 3 MILLILITER(S): 9 INJECTION INTRAMUSCULAR; INTRAVENOUS; SUBCUTANEOUS at 22:12

## 2018-05-11 RX ADMIN — Medication 80 MILLIGRAM(S): at 17:06

## 2018-05-11 RX ADMIN — APIXABAN 2.5 MILLIGRAM(S): 2.5 TABLET, FILM COATED ORAL at 06:24

## 2018-05-11 RX ADMIN — SODIUM CHLORIDE 3 MILLILITER(S): 9 INJECTION INTRAMUSCULAR; INTRAVENOUS; SUBCUTANEOUS at 06:23

## 2018-05-11 RX ADMIN — DORZOLAMIDE HYDROCHLORIDE 1 DROP(S): 20 SOLUTION/ DROPS OPHTHALMIC at 13:06

## 2018-05-11 RX ADMIN — Medication 20 MILLIEQUIVALENT(S): at 17:05

## 2018-05-11 NOTE — PROGRESS NOTE ADULT - PROBLEM SELECTOR PLAN 1
-TTE strongly suggests amyloid cardiomyopathy.   -He diuresed more appropriately after the increase in diuretics, -2290 ml in 24 hrs.   -BUN/cr slighly up today 28/1.49. Will likely slow down Lasix  to 60 mg IV q 8 hours. Continue metolazone 2.5 mg po qd on Tuesday, Thursday, Saturday.  -He tolerated all HF medications: continue lopressor 12.5 mg po BID (for a.fib rate control) and lisinopril 5 mg po qd. -TTE strongly suggests amyloid cardiomyopathy.   -He diuresed more appropriately after the increase in diuretics, -2290 ml in 24 hrs.   -BUN/cr slightly up today 28/1.49. Will likely slow down Lasix  to 60 mg IV q 8 hours. Continue metolazone 2.5 mg po qd on Tuesday, Thursday, Saturday.  -He tolerated all HF medications: continue lopressor 12.5 mg po BID (for a.fib rate control) and lisinopril 5 mg po qd.  -Hypokelemic 3.5. Supplement w/ potassium chloride 20 meq x 1. Keep K 4.0-4.5. -TTE strongly suggests amyloid cardiomyopathy.   -He diuresed more appropriately after the increase in diuretics, -2290 ml in 24 hrs.   -BUN/cr slightly up today 28/1.49. Will d/c metolazone. Keep Lasix 80 mg IV q 8 hours.   -He tolerated all HF medications: continue lopressor 12.5 mg po BID (for a.fib rate control) and lisinopril 5 mg po qd.  -Hypokelemic 3.5. Supplement w/ potassium chloride 20 meq x 1. Keep K 4.0-4.5.

## 2018-05-11 NOTE — PROGRESS NOTE ADULT - SUBJECTIVE AND OBJECTIVE BOX
Patient is a 73y old  Male who presents with a chief complaint of Swelling (08 May 2018 20:22)        SUBJECTIVE / OVERNIGHT EVENTS: No complaint this morning. Denies dyspnea, chest pain.      MEDICATIONS  (STANDING):  amiodarone    Tablet 200 milliGRAM(s) Oral daily  apixaban 5 milliGRAM(s) Oral every 12 hours  atorvastatin 20 milliGRAM(s) Oral at bedtime  brimonidine 0.2% Ophthalmic Solution 1 Drop(s) Both EYES two times a day  dorzolamide 2% Ophthalmic Solution      dorzolamide 2% Ophthalmic Solution 1 Drop(s) Both EYES three times a day  famotidine    Tablet 20 milliGRAM(s) Oral daily  furosemide   Injectable 80 milliGRAM(s) IV Push two times a day  latanoprost 0.005% Ophthalmic Solution 1 Drop(s) Both EYES at bedtime  lisinopril 5 milliGRAM(s) Oral daily  metolazone 2.5 milliGRAM(s) Oral <User Schedule>  metoprolol tartrate 12.5 milliGRAM(s) Oral two times a day  mirtazapine 45 milliGRAM(s) Oral at bedtime  sodium chloride 0.9% lock flush 3 milliLiter(s) IV Push every 8 hours    MEDICATIONS  (PRN):      Vital Signs Last 24 Hrs  T(C): 36.5 (11 May 2018 06:22), Max: 36.9 (10 May 2018 13:23)  T(F): 97.7 (11 May 2018 06:22), Max: 98.5 (10 May 2018 13:23)  HR: 90 (11 May 2018 06:22) (68 - 90)  BP: 104/63 (11 May 2018 06:22) (92/61 - 104/63)  BP(mean): --  RR: 16 (11 May 2018 06:22) (16 - 18)  SpO2: 99% (11 May 2018 06:22) (97% - 99%)  CAPILLARY BLOOD GLUCOSE        I&O's Summary    10 May 2018 07:01  -  11 May 2018 07:00  --------------------------------------------------------  IN: 460 mL / OUT: 2750 mL / NET: -2290 mL          PHYSICAL EXAM  GENERAL: older man, responding to questions with one-word answers, NAD  HEAD:  Atraumatic, Normocephalic  EYES: EOMI, PERRLA, conjunctiva and sclera clear  NECK: Supple, No JVD  CHEST/LUNG: Clear to auscultation bilaterally; No wheeze  HEART: Regular rate and rhythm; No murmurs, rubs, or gallops  ABDOMEN: Soft, Nontender, Nondistended; Bowel sounds present  EXTREMITIES:  2+ Peripheral Pulses, No clubbing, cyanosis, or edema      LABS:                        10.7   9.78  )-----------( 246      ( 11 May 2018 07:10 )             32.6     05-11    139  |  98  |  28<H>  ----------------------------<  86  3.5   |  29  |  1.49<H>    Ca    9.0      11 May 2018 07:10  Mg     2.0     05-10                Culture - Urine (collected 09 May 2018 06:44)  Source: URINE MIDSTREAM  Preliminary Report (10 May 2018 09:58):    GNRID^Gram Neg Valentin To Be Identified    COLONY COUNT: 10,000-49,000 CFU/mL        RADIOLOGY & ADDITIONAL TESTS:    Imaging Personally Reviewed:  Consultant(s) Notes Reviewed:  Heart Failure Team  Care Discussed with Consultants/Other Providers:

## 2018-05-11 NOTE — PROGRESS NOTE ADULT - PROBLEM SELECTOR PLAN 1
- Repeat TTE pending  - Strict I/Os.  Daily weights (unable to do standing given R sided hemiparesis)   - Will speak to Heart Failure regarding diuretic dose given significant increase in creatinine. Continue ACE, BB.  - Low salt diet

## 2018-05-11 NOTE — PROGRESS NOTE ADULT - SUBJECTIVE AND OBJECTIVE BOX
Medications:  amiodarone    Tablet 200 milliGRAM(s) Oral daily  apixaban 5 milliGRAM(s) Oral every 12 hours  atorvastatin 20 milliGRAM(s) Oral at bedtime  brimonidine 0.2% Ophthalmic Solution 1 Drop(s) Both EYES two times a day  dorzolamide 2% Ophthalmic Solution      dorzolamide 2% Ophthalmic Solution 1 Drop(s) Both EYES three times a day  famotidine    Tablet 20 milliGRAM(s) Oral daily  furosemide   Injectable 80 milliGRAM(s) IV Push two times a day  latanoprost 0.005% Ophthalmic Solution 1 Drop(s) Both EYES at bedtime  lisinopril 5 milliGRAM(s) Oral daily  metolazone 2.5 milliGRAM(s) Oral <User Schedule>  metoprolol tartrate 12.5 milliGRAM(s) Oral two times a day  mirtazapine 45 milliGRAM(s) Oral at bedtime  sodium chloride 0.9% lock flush 3 milliLiter(s) IV Push every 8 hours      Vitals:  Vital Signs Last 24 Hrs  T(C): 36.5 (11 May 2018 06:22), Max: 36.9 (10 May 2018 13:23)  T(F): 97.7 (11 May 2018 06:22), Max: 98.5 (10 May 2018 13:23)  HR: 90 (11 May 2018 06:22) (68 - 90)  BP: 104/63 (11 May 2018 06:22) (92/61 - 104/63)  RR: 16 (11 May 2018 06:22) (16 - 18)  SpO2: 99% (11 May 2018 06:22) (97% - 99%)    Daily     Daily Weight in k (11 May 2018 06:22)    I&O's Detail    10 May 2018 07:01  -  11 May 2018 07:00  --------------------------------------------------------  IN:    Oral Fluid: 460 mL  Total IN: 460 mL    OUT:    Indwelling Catheter - Urethral: 1750 mL    Voided: 1000 mL  Total OUT: 2750 mL    Total NET: -2290 mL          Physical Exam:     General: No distress. Comfortable.  HEENT: EOM intact.  Neck: Neck supple. JVP not elevated. No masses  Chest: Clear to auscultation bilaterally  CV: Normal S1 and S2. No murmurs, rub, or gallops. Radial pulses normal.  Abdomen: Soft, non-distended, non-tender  Skin: No rashes or skin breakdown  Neurology: Alert and oriented times three. Sensation intact  Psych: Affect normal    Labs:                        10.7   9.78  )-----------( 246      ( 11 May 2018 07:10 )             32.6     05-11    139  |  98  |  28<H>  ----------------------------<  86  3.5   |  29  |  1.49<H>    Ca    9.0      11 May 2018 07:10  Mg     2.0     05-10    < from: Transthoracic Echocardiogram (17 @ 11:10) >  DIMENSIONS:  Dimensions:     Normal Values:  LA:     4.8 cm    2.0 - 4.0 cm  Ao:     2.9 cm    2.0 - 3.8 cm  SEPTUM: 2.1 cm    0.6 - 1.2 cm  PWT:    2.0 cm  0.6 - 1.1 cm  LVIDd:  3.4 cm    3.0 - 5.6 cm  LVIDs:  3.2 cm    1.8 - 4.0 cm  Derived Variables:  LVMI: 193 g/m2  RWT: 1.17  Fractional short: 6 %  Ejection Fraction (Teicholtz): 14 %  ------------------------------------------------------------------------  OBSERVATIONS:  Mitral Valve: Tethered mitral valve leaflets with normal  opening. Mild-moderate mitral regurgitation.  Aortic Root: Normal aortic root.  Aortic Valve: Normal trileaflet aortic valve.  Left Atrium: Severely dilated left atrium.  LA volume index  = 57 cc/m2.  Left Ventricle: Severe global left ventricular systolic  dysfunction. Severe concentric left ventricular  hypertrophy.  Right Heart: Moderate right atrial enlargement. Normal  right ventricular size with decreased right ventricular  systolic function. A device wire is noted in the right  heart. Normal tricuspid valve. Mild tricuspid  regurgitation. Normal pulmonic valve.  Pericardium/PleuraNormal pericardium with no pericardial  effusion.  Hemodynamic: Estimated right ventricular systolic pressure  equals 41 mm Hg, assuming right atrial pressure equals 10  mm Hg, consistent with mild pulmonary hypertension.    < end of copied text > Patient seen and examined. He has no complaints. No SOB, orthopnea, CP, palpitations.       Medications:  amiodarone    Tablet 200 milliGRAM(s) Oral daily  apixaban 5 milliGRAM(s) Oral every 12 hours  atorvastatin 20 milliGRAM(s) Oral at bedtime  brimonidine 0.2% Ophthalmic Solution 1 Drop(s) Both EYES two times a day  dorzolamide 2% Ophthalmic Solution      dorzolamide 2% Ophthalmic Solution 1 Drop(s) Both EYES three times a day  famotidine    Tablet 20 milliGRAM(s) Oral daily  furosemide   Injectable 80 milliGRAM(s) IV Push two times a day  latanoprost 0.005% Ophthalmic Solution 1 Drop(s) Both EYES at bedtime  lisinopril 5 milliGRAM(s) Oral daily  metolazone 2.5 milliGRAM(s) Oral <User Schedule>  metoprolol tartrate 12.5 milliGRAM(s) Oral two times a day  mirtazapine 45 milliGRAM(s) Oral at bedtime  sodium chloride 0.9% lock flush 3 milliLiter(s) IV Push every 8 hours      Vitals:  Vital Signs Last 24 Hrs  T(C): 36.5 (11 May 2018 06:22), Max: 36.9 (10 May 2018 13:23)  T(F): 97.7 (11 May 2018 06:22), Max: 98.5 (10 May 2018 13:23)  HR: 90 (11 May 2018 06:22) (68 - 90)  BP: 104/63 (11 May 2018 06:22) (92/61 - 104/63)  RR: 16 (11 May 2018 06:22) (16 - 18)  SpO2: 99% (11 May 2018 06:22) (97% - 99%)    Daily     Daily Weight in k (11 May 2018 06:22)    I&O's Detail    10 May 2018 07:01  -  11 May 2018 07:00  --------------------------------------------------------  IN:    Oral Fluid: 460 mL  Total IN: 460 mL    OUT:    Indwelling Catheter - Urethral: 1750 mL    Voided: 1000 mL  Total OUT: 2750 mL    Total NET: -2290 mL      Physical Exam:     General: No distress. Comfortable.  HEENT: EOM intact.  Neck: Neck supple. JVP moderately elevated. No masses  Chest: Clear to auscultation bilaterally  CV: Irregularly irregular. No murmurs, rub, or gallops. Radial pulses normal. 1+ ankle edema b/l.   Abdomen: Soft, non-distended, non-tender  Skin: No rashes or skin breakdown  Neurology: Alert and oriented times three. Sensation intact  Psych: Affect normal    Labs:                        10.7   9.78  )-----------( 246      ( 11 May 2018 07:10 )             32.6     05-11    139  |  98  |  28<H>  ----------------------------<  86  3.5   |  29  |  1.49<H>    Ca    9.0      11 May 2018 07:10  Mg     2.0     05-10    < from: Transthoracic Echocardiogram (17 @ 11:10) >  DIMENSIONS:  Dimensions:     Normal Values:  LA:     4.8 cm    2.0 - 4.0 cm  Ao:     2.9 cm    2.0 - 3.8 cm  SEPTUM: 2.1 cm    0.6 - 1.2 cm  PWT:    2.0 cm  0.6 - 1.1 cm  LVIDd:  3.4 cm    3.0 - 5.6 cm  LVIDs:  3.2 cm    1.8 - 4.0 cm  Derived Variables:  LVMI: 193 g/m2  RWT: 1.17  Fractional short: 6 %  Ejection Fraction (Teicholtz): 14 %  ------------------------------------------------------------------------  OBSERVATIONS:  Mitral Valve: Tethered mitral valve leaflets with normal  opening. Mild-moderate mitral regurgitation.  Aortic Root: Normal aortic root.  Aortic Valve: Normal trileaflet aortic valve.  Left Atrium: Severely dilated left atrium.  LA volume index  = 57 cc/m2.  Left Ventricle: Severe global left ventricular systolic  dysfunction. Severe concentric left ventricular  hypertrophy.  Right Heart: Moderate right atrial enlargement. Normal  right ventricular size with decreased right ventricular  systolic function. A device wire is noted in the right  heart. Normal tricuspid valve. Mild tricuspid  regurgitation. Normal pulmonic valve.  Pericardium/PleuraNormal pericardium with no pericardial  effusion.  Hemodynamic: Estimated right ventricular systolic pressure  equals 41 mm Hg, assuming right atrial pressure equals 10  mm Hg, consistent with mild pulmonary hypertension.    < end of copied text >

## 2018-05-11 NOTE — PROGRESS NOTE ADULT - ATTENDING COMMENTS
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 temporal arteritis who presented on 5/8 referred by primary care for b/l LE edema, ascites in setting of apparent dietary indiscretion. Was put on lasix 40 mg IV q8h with only 1200 cc documented output. Improved with lasix 80 mg IV q12; also received metolazone. On exam, elevated JVD, NAD, RRR, no m/r/g, lungs clear, abdom distended, 1+ LE b/l. Labs reviewed - BUN/Cr 28/1.49 (uptrended; improved from 1.56 3/18), Hb 10, Trop T 0.2. TTE 8/17 reviewed - severe concentric LVH (septum 1.7 cm, PW 1.7 cm), LVEDD 3.3 cm, EF 15%, biatrial enlargement, RV dysfunction, moderate MR/TR. 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  - continue metoprolol 12.5 mg q12  - continue lasix 80 mg IV q12; hold metolazone  - keep K>4, Mg>2  - change Eliquis to 5 mg bid  - repeat TTE  - would do cardiac amyloid workup; serum free light chains wnl; serum and urine immunofixation pending; will consider pyrophosphate study to evaluate for TTR

## 2018-05-11 NOTE — PROGRESS NOTE ADULT - PROBLEM SELECTOR PLAN 2
- CHADSVASC= 5  - Rate control with BB and Amiodarone, currently rate controlled   - Anticoagulate with Eliquis 5mg bid

## 2018-05-12 DIAGNOSIS — R94.6 ABNORMAL RESULTS OF THYROID FUNCTION STUDIES: ICD-10-CM

## 2018-05-12 LAB
BUN SERPL-MCNC: 36 MG/DL — HIGH (ref 7–23)
CALCIUM SERPL-MCNC: 9.4 MG/DL — SIGNIFICANT CHANGE UP (ref 8.4–10.5)
CHLORIDE SERPL-SCNC: 95 MMOL/L — LOW (ref 98–107)
CO2 SERPL-SCNC: 29 MMOL/L — SIGNIFICANT CHANGE UP (ref 22–31)
CREAT SERPL-MCNC: 1.6 MG/DL — HIGH (ref 0.5–1.3)
GLUCOSE SERPL-MCNC: 89 MG/DL — SIGNIFICANT CHANGE UP (ref 70–99)
HCT VFR BLD CALC: 33.6 % — LOW (ref 39–50)
HGB BLD-MCNC: 11.1 G/DL — LOW (ref 13–17)
KAPPA FREE LIGHT CHAINS, SERUM: 5.26 MG/DL — HIGH (ref 0.33–1.94)
LAMBDA FREE LIGHT CHAINS, SERUM: 12.5 MG/DL — HIGH (ref 0.57–2.63)
MCHC RBC-ENTMCNC: 32.9 PG — SIGNIFICANT CHANGE UP (ref 27–34)
MCHC RBC-ENTMCNC: 33 % — SIGNIFICANT CHANGE UP (ref 32–36)
MCV RBC AUTO: 99.7 FL — SIGNIFICANT CHANGE UP (ref 80–100)
NRBC # FLD: 0.04 — SIGNIFICANT CHANGE UP
PLATELET # BLD AUTO: 252 K/UL — SIGNIFICANT CHANGE UP (ref 150–400)
PMV BLD: 10.3 FL — SIGNIFICANT CHANGE UP (ref 7–13)
POTASSIUM SERPL-MCNC: 3.5 MMOL/L — SIGNIFICANT CHANGE UP (ref 3.5–5.3)
POTASSIUM SERPL-SCNC: 3.5 MMOL/L — SIGNIFICANT CHANGE UP (ref 3.5–5.3)
RBC # BLD: 3.37 M/UL — LOW (ref 4.2–5.8)
RBC # FLD: 18 % — HIGH (ref 10.3–14.5)
SODIUM SERPL-SCNC: 140 MMOL/L — SIGNIFICANT CHANGE UP (ref 135–145)
WBC # BLD: 10.58 K/UL — HIGH (ref 3.8–10.5)
WBC # FLD AUTO: 10.58 K/UL — HIGH (ref 3.8–10.5)

## 2018-05-12 PROCEDURE — 99233 SBSQ HOSP IP/OBS HIGH 50: CPT

## 2018-05-12 RX ADMIN — DORZOLAMIDE HYDROCHLORIDE 1 DROP(S): 20 SOLUTION/ DROPS OPHTHALMIC at 21:10

## 2018-05-12 RX ADMIN — BRIMONIDINE TARTRATE 1 DROP(S): 2 SOLUTION/ DROPS OPHTHALMIC at 06:55

## 2018-05-12 RX ADMIN — LATANOPROST 1 DROP(S): 0.05 SOLUTION/ DROPS OPHTHALMIC; TOPICAL at 21:09

## 2018-05-12 RX ADMIN — Medication 80 MILLIGRAM(S): at 06:55

## 2018-05-12 RX ADMIN — Medication 80 MILLIGRAM(S): at 17:27

## 2018-05-12 RX ADMIN — SODIUM CHLORIDE 3 MILLILITER(S): 9 INJECTION INTRAMUSCULAR; INTRAVENOUS; SUBCUTANEOUS at 06:09

## 2018-05-12 RX ADMIN — MIRTAZAPINE 45 MILLIGRAM(S): 45 TABLET, ORALLY DISINTEGRATING ORAL at 21:07

## 2018-05-12 RX ADMIN — SODIUM CHLORIDE 3 MILLILITER(S): 9 INJECTION INTRAMUSCULAR; INTRAVENOUS; SUBCUTANEOUS at 14:17

## 2018-05-12 RX ADMIN — APIXABAN 5 MILLIGRAM(S): 2.5 TABLET, FILM COATED ORAL at 06:55

## 2018-05-12 RX ADMIN — BRIMONIDINE TARTRATE 1 DROP(S): 2 SOLUTION/ DROPS OPHTHALMIC at 17:27

## 2018-05-12 RX ADMIN — APIXABAN 5 MILLIGRAM(S): 2.5 TABLET, FILM COATED ORAL at 17:27

## 2018-05-12 RX ADMIN — DORZOLAMIDE HYDROCHLORIDE 1 DROP(S): 20 SOLUTION/ DROPS OPHTHALMIC at 12:20

## 2018-05-12 RX ADMIN — AMIODARONE HYDROCHLORIDE 200 MILLIGRAM(S): 400 TABLET ORAL at 06:55

## 2018-05-12 RX ADMIN — DORZOLAMIDE HYDROCHLORIDE 1 DROP(S): 20 SOLUTION/ DROPS OPHTHALMIC at 06:55

## 2018-05-12 RX ADMIN — ATORVASTATIN CALCIUM 20 MILLIGRAM(S): 80 TABLET, FILM COATED ORAL at 21:07

## 2018-05-12 RX ADMIN — FAMOTIDINE 20 MILLIGRAM(S): 10 INJECTION INTRAVENOUS at 12:19

## 2018-05-12 RX ADMIN — LISINOPRIL 5 MILLIGRAM(S): 2.5 TABLET ORAL at 12:19

## 2018-05-12 RX ADMIN — SODIUM CHLORIDE 3 MILLILITER(S): 9 INJECTION INTRAMUSCULAR; INTRAVENOUS; SUBCUTANEOUS at 21:03

## 2018-05-12 RX ADMIN — Medication 12.5 MILLIGRAM(S): at 17:27

## 2018-05-12 NOTE — PROGRESS NOTE ADULT - SUBJECTIVE AND OBJECTIVE BOX
HPI:  History provided by the pt's emergency contact, son Jon Salomon , who is currently not present at the bedside and the chart.   73M, ambulates with a cane, lately wheelchair due to CVA in  with residual right sided hemiparesis, expressive aphasia, dysarthria, A Fib on Eliquis, PPM, CHF EF 14%, HTN, prostate CA s/p seed implants in , Temporal arteritis, went to his PCP on   for whole body pain. The pt was advised to go the ED due to anasarca and a concern for CHF exacerbation and urinary retention.  Pt is aphasic at baseline and has difficulty communicating. Per the son, the pt does not monitor his salt intake, has been complaint with all of his medications, Positive orthopnea, Dyspnea on minimal exertion, b/l LE swelling, atraumatic fall 1 week ago and did not seek medical attention. No chest pain, palpitations, nausea, vomit, chills, diaphoresis, diarrhea, constipation, dysuria, hematuria.  In the Ed, the pt is found to be fluid over loaded and was give Lasix 40 mg IV x 1 with positive urine output. (08 May 2018 20:22)    Denies chest pain or SOB, LE edema improving    Review Of Systems:  Constitutional: [ ] Fever [ ] Chills [ ] Fatigue [ ] Weight change   HEENT: [ ] Blurred vision [ ] Eye Pain [ ] Headache [ ] Runny nose [ ] Sore Throat   Respiratory: [ ] Cough [ ] Wheezing [ ] Shortness of breath  Cardiovascular: [ ] Chest Pain [ ] Palpitations [ ] RODRIGUEZ [ ] PND [ ] Orthopnea  Gastrointestinal: [ ] Abdominal Pain [ ] Diarrhea [ ] Constipation [ ] Hemorrhoids [ ] Nausea [ ] Vomiting  Genitourinary: [ ] Nocturia [ ] Dysuria [ ] Incontinence  Extremities: [ ] Swelling [ ] Joint Pain  Neurologic: [ ] Focal deficit [ ] Paresthesias [ ] Syncope  Lymphatic: [ ] Swelling [ ] Lymphadenopathy   Skin: [ ] Rash [ ] Ecchymoses [ ] Wounds [ ] Lesions  Psychiatry: [ ] Depression [ ] Suicidal/Homicidal Ideation [ ] Anxiety [ ] Sleep Disturbances  [X] 10 point review of systems is otherwise negative except as mentioned above            [ ]Unable to obtain    Medications:  amiodarone    Tablet 200 milliGRAM(s) Oral daily  apixaban 5 milliGRAM(s) Oral every 12 hours  atorvastatin 20 milliGRAM(s) Oral at bedtime  brimonidine 0.2% Ophthalmic Solution 1 Drop(s) Both EYES two times a day  dorzolamide 2% Ophthalmic Solution      dorzolamide 2% Ophthalmic Solution 1 Drop(s) Both EYES three times a day  famotidine    Tablet 20 milliGRAM(s) Oral daily  furosemide   Injectable 80 milliGRAM(s) IV Push two times a day  latanoprost 0.005% Ophthalmic Solution 1 Drop(s) Both EYES at bedtime  lisinopril 5 milliGRAM(s) Oral daily  metoprolol tartrate 12.5 milliGRAM(s) Oral two times a day  mirtazapine 45 milliGRAM(s) Oral at bedtime  sodium chloride 0.9% lock flush 3 milliLiter(s) IV Push every 8 hours    PMH/PSH/FH/SH: [X] Unchanged  Vitals:  T(C): 36.7 (18 @ 06:17), Max: 36.7 (18 @ 06:17)  HR: 64 (18 @ 06:17) (64 - 88)  BP: 92/62 (18 @ 06:17) (92/62 - 109/76)  RR: 16 (18 @ 06:17) (16 - 18)  SpO2: 100% (18 @ 06:17) (97% - 100%)  Daily Weight in k.6 (12 May 2018 07:35)  I&O's Summary    11 May 2018 07:01  -  12 May 2018 07:00  --------------------------------------------------------  IN: 0 mL / OUT: 2975 mL / NET: -2975 mL    Physical Exam:  Appearance:  Normal, NAD  HENT: Normal oral [place] NC/AT  Cardiovascular: S1, S2, RRR, No m/r/g appreciated, trace pedal edema  Respiratory: Clear to auscultation bilaterally  Gastrointestinal: Soft, Non-tender, Non-distended, BS+  Musculoskeletal:  No clubbing, No joint deformity   Skin: No rashes, No ecchymoses, No cyanosis    Labs:                        11.1   10.58 )-----------( 252      ( 12 May 2018 06:14 )             33.6         140  |  95<L>  |  36<H>  ----------------------------<  89  3.5   |  29  |  1.60<H>    Ca    9.4      12 May 2018 06:14    Serum Pro-Brain Natriuretic Peptide: 96774 pg/mL (05-10 @ 06:25)  Serum Pro-Brain Natriuretic Peptide: 24032 pg/mL ( @ 14:43)    Interpretation of Telemetry:

## 2018-05-12 NOTE — PROGRESS NOTE ADULT - SUBJECTIVE AND OBJECTIVE BOX
Patient is a 73y old  Male who presents with a chief complaint of Swelling (08 May 2018 20:22)      SUBJECTIVE / OVERNIGHT EVENTS: Patient denies CP, n/v. SOB same compared to yesterday but overall better    Review of Systems:     MEDICATIONS  (STANDING):  amiodarone    Tablet 200 milliGRAM(s) Oral daily  apixaban 5 milliGRAM(s) Oral every 12 hours  atorvastatin 20 milliGRAM(s) Oral at bedtime  brimonidine 0.2% Ophthalmic Solution 1 Drop(s) Both EYES two times a day  dorzolamide 2% Ophthalmic Solution      dorzolamide 2% Ophthalmic Solution 1 Drop(s) Both EYES three times a day  famotidine    Tablet 20 milliGRAM(s) Oral daily  furosemide   Injectable 80 milliGRAM(s) IV Push two times a day  latanoprost 0.005% Ophthalmic Solution 1 Drop(s) Both EYES at bedtime  lisinopril 5 milliGRAM(s) Oral daily  metoprolol tartrate 12.5 milliGRAM(s) Oral two times a day  mirtazapine 45 milliGRAM(s) Oral at bedtime  sodium chloride 0.9% lock flush 3 milliLiter(s) IV Push every 8 hours    MEDICATIONS  (PRN):      PHYSICAL EXAM:  Vital Signs Last 24 Hrs  T(C): 36.7 (12 May 2018 06:17), Max: 36.7 (12 May 2018 06:17)  T(F): 98 (12 May 2018 06:17), Max: 98 (12 May 2018 06:17)  HR: 64 (12 May 2018 06:17) (64 - 88)  BP: 92/62 (12 May 2018 06:17) (92/62 - 109/76)  BP(mean): --  RR: 16 (12 May 2018 06:17) (16 - 18)  SpO2: 100% (12 May 2018 06:17) (97% - 100%)  I&O's Summary    11 May 2018 07:01  -  12 May 2018 07:00  --------------------------------------------------------  IN: 0 mL / OUT: 2975 mL / NET: -2975 mL    PHYSICAL EXAM  GENERAL: older man, responding to questions with one-word answers, NAD  HEAD:  Atraumatic, Normocephalic  EYES: EOMI, PERRLA, conjunctiva and sclera clear  NECK: Supple, No JVD  CHEST/LUNG: Clear to auscultation bilaterally; No wheeze  HEART: Regular rate and rhythm; No murmurs, rubs, or gallops  ABDOMEN: Soft, Nontender, Nondistended; Bowel sounds present  EXTREMITIES:  2+ Peripheral Pulses, No clubbing, cyanosis. Left foot > than R -no tenderness in the extremities       LABS:  CAPILLARY BLOOD GLUCOSE                              11.1   10.58 )-----------( 252      ( 12 May 2018 06:14 )             33.6     05-12    140  |  95<L>  |  36<H>  ----------------------------<  89  3.5   |  29  |  1.60<H>    Ca    9.4      12 May 2018 06:14                RADIOLOGY & ADDITIONAL TESTS:    Imaging Personally Reviewed:    Consultant(s) Notes Reviewed:      Care Discussed with Consultants/Other Providers:

## 2018-05-12 NOTE — PROGRESS NOTE ADULT - PROBLEM SELECTOR PLAN 1
- S/p TTE 5/11 personally reviewed and showed EF 40% with decreased RV function  - Strict I/Os.  Daily weights (unable to do standing given R sided hemiparesis) . Pt  - Will speak to Heart Failure regarding diuretic dose as creatinine continues to rise. Metolazone was dcd 5/11 (with last dose received on 5/10)   - Continue ACE, BB.  - Low salt diet  -f/u pending labs for cardiac amyloid w/u

## 2018-05-12 NOTE — PROGRESS NOTE ADULT - PROBLEM SELECTOR PLAN 1
-TTE strongly suggests amyloid cardiomyopathy.    -Keep Lasix 80 mg IV q 8 hours.   -He tolerated all HF medications: continue lopressor 12.5 mg po BID (for a.fib rate control) and lisinopril 5 mg po qd.

## 2018-05-13 LAB
BUN SERPL-MCNC: 37 MG/DL — HIGH (ref 7–23)
CALCIUM SERPL-MCNC: 9.3 MG/DL — SIGNIFICANT CHANGE UP (ref 8.4–10.5)
CHLORIDE SERPL-SCNC: 93 MMOL/L — LOW (ref 98–107)
CO2 SERPL-SCNC: 33 MMOL/L — HIGH (ref 22–31)
CREAT SERPL-MCNC: 1.54 MG/DL — HIGH (ref 0.5–1.3)
GLUCOSE SERPL-MCNC: 84 MG/DL — SIGNIFICANT CHANGE UP (ref 70–99)
HCT VFR BLD CALC: 28.5 % — LOW (ref 39–50)
HGB BLD-MCNC: 9.5 G/DL — LOW (ref 13–17)
MAGNESIUM SERPL-MCNC: 2.2 MG/DL — SIGNIFICANT CHANGE UP (ref 1.6–2.6)
MCHC RBC-ENTMCNC: 33.3 % — SIGNIFICANT CHANGE UP (ref 32–36)
MCHC RBC-ENTMCNC: 33.5 PG — SIGNIFICANT CHANGE UP (ref 27–34)
MCV RBC AUTO: 100.4 FL — HIGH (ref 80–100)
NRBC # FLD: 0.02 — SIGNIFICANT CHANGE UP
PLATELET # BLD AUTO: 255 K/UL — SIGNIFICANT CHANGE UP (ref 150–400)
PMV BLD: 10.6 FL — SIGNIFICANT CHANGE UP (ref 7–13)
POTASSIUM SERPL-MCNC: 3.1 MMOL/L — LOW (ref 3.5–5.3)
POTASSIUM SERPL-SCNC: 3.1 MMOL/L — LOW (ref 3.5–5.3)
RBC # BLD: 2.84 M/UL — LOW (ref 4.2–5.8)
RBC # FLD: 17.8 % — HIGH (ref 10.3–14.5)
SODIUM SERPL-SCNC: 140 MMOL/L — SIGNIFICANT CHANGE UP (ref 135–145)
T3 SERPL-MCNC: 54.6 NG/DL — LOW (ref 80–200)
T4 FREE SERPL-MCNC: 1.93 NG/DL — HIGH (ref 0.9–1.8)
WBC # BLD: 9.26 K/UL — SIGNIFICANT CHANGE UP (ref 3.8–10.5)
WBC # FLD AUTO: 9.26 K/UL — SIGNIFICANT CHANGE UP (ref 3.8–10.5)

## 2018-05-13 PROCEDURE — 99233 SBSQ HOSP IP/OBS HIGH 50: CPT

## 2018-05-13 RX ORDER — FUROSEMIDE 40 MG
40 TABLET ORAL
Qty: 0 | Refills: 0 | Status: DISCONTINUED | OUTPATIENT
Start: 2018-05-13 | End: 2018-05-14

## 2018-05-13 RX ORDER — POTASSIUM CHLORIDE 20 MEQ
40 PACKET (EA) ORAL ONCE
Qty: 0 | Refills: 0 | Status: COMPLETED | OUTPATIENT
Start: 2018-05-13 | End: 2018-05-13

## 2018-05-13 RX ORDER — SPIRONOLACTONE 25 MG/1
12.5 TABLET, FILM COATED ORAL DAILY
Qty: 0 | Refills: 0 | Status: DISCONTINUED | OUTPATIENT
Start: 2018-05-13 | End: 2018-05-14

## 2018-05-13 RX ORDER — METOPROLOL TARTRATE 50 MG
25 TABLET ORAL DAILY
Qty: 0 | Refills: 0 | Status: DISCONTINUED | OUTPATIENT
Start: 2018-05-13 | End: 2018-05-14

## 2018-05-13 RX ORDER — SPIRONOLACTONE 25 MG/1
25 TABLET, FILM COATED ORAL DAILY
Qty: 0 | Refills: 0 | Status: DISCONTINUED | OUTPATIENT
Start: 2018-05-13 | End: 2018-05-13

## 2018-05-13 RX ADMIN — SODIUM CHLORIDE 3 MILLILITER(S): 9 INJECTION INTRAMUSCULAR; INTRAVENOUS; SUBCUTANEOUS at 05:39

## 2018-05-13 RX ADMIN — DORZOLAMIDE HYDROCHLORIDE 1 DROP(S): 20 SOLUTION/ DROPS OPHTHALMIC at 21:22

## 2018-05-13 RX ADMIN — DORZOLAMIDE HYDROCHLORIDE 1 DROP(S): 20 SOLUTION/ DROPS OPHTHALMIC at 05:59

## 2018-05-13 RX ADMIN — APIXABAN 5 MILLIGRAM(S): 2.5 TABLET, FILM COATED ORAL at 18:36

## 2018-05-13 RX ADMIN — FAMOTIDINE 20 MILLIGRAM(S): 10 INJECTION INTRAVENOUS at 10:11

## 2018-05-13 RX ADMIN — SODIUM CHLORIDE 3 MILLILITER(S): 9 INJECTION INTRAMUSCULAR; INTRAVENOUS; SUBCUTANEOUS at 13:22

## 2018-05-13 RX ADMIN — AMIODARONE HYDROCHLORIDE 200 MILLIGRAM(S): 400 TABLET ORAL at 05:57

## 2018-05-13 RX ADMIN — DORZOLAMIDE HYDROCHLORIDE 1 DROP(S): 20 SOLUTION/ DROPS OPHTHALMIC at 15:15

## 2018-05-13 RX ADMIN — SODIUM CHLORIDE 3 MILLILITER(S): 9 INJECTION INTRAMUSCULAR; INTRAVENOUS; SUBCUTANEOUS at 21:22

## 2018-05-13 RX ADMIN — BRIMONIDINE TARTRATE 1 DROP(S): 2 SOLUTION/ DROPS OPHTHALMIC at 18:36

## 2018-05-13 RX ADMIN — ATORVASTATIN CALCIUM 20 MILLIGRAM(S): 80 TABLET, FILM COATED ORAL at 21:22

## 2018-05-13 RX ADMIN — MIRTAZAPINE 45 MILLIGRAM(S): 45 TABLET, ORALLY DISINTEGRATING ORAL at 21:22

## 2018-05-13 RX ADMIN — APIXABAN 5 MILLIGRAM(S): 2.5 TABLET, FILM COATED ORAL at 05:57

## 2018-05-13 RX ADMIN — Medication 40 MILLIEQUIVALENT(S): at 10:11

## 2018-05-13 RX ADMIN — LATANOPROST 1 DROP(S): 0.05 SOLUTION/ DROPS OPHTHALMIC; TOPICAL at 21:22

## 2018-05-13 RX ADMIN — BRIMONIDINE TARTRATE 1 DROP(S): 2 SOLUTION/ DROPS OPHTHALMIC at 05:59

## 2018-05-13 NOTE — PROGRESS NOTE ADULT - PROBLEM SELECTOR PLAN 9
VTE with Eliquis.  Fall, Aspirations, safety and seizure precautions. VTE with Eliquis.  Fall, Aspirations, safety and seizure precautions.  Would dc gomes ar this time

## 2018-05-13 NOTE — PROGRESS NOTE ADULT - SUBJECTIVE AND OBJECTIVE BOX
24H hour events: No acute events.    MEDICATIONS:  amiodarone    Tablet 200 milliGRAM(s) Oral daily  apixaban 5 milliGRAM(s) Oral every 12 hours  furosemide   Injectable 80 milliGRAM(s) IV Push two times a day  lisinopril 5 milliGRAM(s) Oral daily  metoprolol tartrate 12.5 milliGRAM(s) Oral two times a day  mirtazapine 45 milliGRAM(s) Oral at bedtime  famotidine    Tablet 20 milliGRAM(s) Oral daily  atorvastatin 20 milliGRAM(s) Oral at bedtime  brimonidine 0.2% Ophthalmic Solution 1 Drop(s) Both EYES two times a day  dorzolamide 2% Ophthalmic Solution      dorzolamide 2% Ophthalmic Solution 1 Drop(s) Both EYES three times a day  latanoprost 0.005% Ophthalmic Solution 1 Drop(s) Both EYES at bedtime  potassium chloride    Tablet ER 40 milliEquivalent(s) Oral once  sodium chloride 0.9% lock flush 3 milliLiter(s) IV Push every 8 hours    REVIEW OF SYSTEMS:  Denies cardiac complaints. Not ambulating due to gomes.     PHYSICAL EXAM:  T(C): 36.6 (05-13-18 @ 04:52), Max: 36.7 (05-12-18 @ 12:15)  HR: 73 (05-13-18 @ 08:34) (63 - 75)  BP: 120/69 (05-13-18 @ 08:34) (86/51 - 120/69)  RR: 16 (05-13-18 @ 08:34) (16 - 18)  SpO2: 100% (05-13-18 @ 04:52) (100% - 100%)  Wt(kg): --  I&O's Summary    12 May 2018 07:01  -  13 May 2018 07:00  --------------------------------------------------------  IN: 758 mL / OUT: 2695 mL / NET: -1937 mL    13 May 2018 07:01  -  13 May 2018 08:44  --------------------------------------------------------  IN: 0 mL / OUT: 100 mL / NET: -100 mL    Appearance: frail, dysarthria, unilateral weakness  HEENT:   Normal oral mucosa, PERRL, EOMI	  Cardiovascular: Normal S1 S2, No JVD, No murmurs, No edema  Respiratory: Lungs clear to auscultation	  Psychiatry: A & O x 3, Mood & affect appropriate  Gastrointestinal:  Soft, Non-tender, + BS	  Skin: No rashes, No ecchymoses, No cyanosis	  Neurologic: Non-focal  Extremities: Normal range of motion, No clubbing, cyanosis or edema  Vascular: Peripheral pulses palpable 2+ bilaterally      LABS:	 	    CBC Full  -  ( 13 May 2018 07:00 )  WBC Count : 9.26 K/uL  Hemoglobin : 9.5 g/dL  Hematocrit : 28.5 %  Platelet Count - Automated : 255 K/uL  Mean Cell Volume : 100.4 fL  Mean Cell Hemoglobin : 33.5 pg  Mean Cell Hemoglobin Concentration : 33.3 %  Auto Neutrophil # : x  Auto Lymphocyte # : x  Auto Monocyte # : x  Auto Eosinophil # : x  Auto Basophil # : x  Auto Neutrophil % : x  Auto Lymphocyte % : x  Auto Monocyte % : x  Auto Eosinophil % : x  Auto Basophil % : x    05-13    140  |  93<L>  |  37<H>  ----------------------------<  84  3.1<L>   |  33<H>  |  1.54<H>  05-12    140  |  95<L>  |  36<H>  ----------------------------<  89  3.5   |  29  |  1.60<H>    Ca    9.3      13 May 2018 06:52  Ca    9.4      12 May 2018 06:14  Mg     2.2     05-13      proBNP: Serum Pro-Brain Natriuretic Peptide: 01658 pg/mL (05-10 @ 06:25)  Serum Pro-Brain Natriuretic Peptide: 19754 pg/mL (05-08 @ 14:43)    TELEMETRY: AS- 60s	      < from: Transthoracic Echocardiogram (05.11.18 @ 15:39) >  DIMENSIONS:  Dimensions:     Normal Values:  LA:     4.7 cm    2.0 - 4.0 cm  Ao:     2.5 cm    2.0 - 3.8 cm  SEPTUM: 1.5 cm    0.6 - 1.2 cm  PWT:    1.7 cm    0.6 - 1.1 cm  LVIDd:  3.7 cm    3.0 - 5.6 cm  LVIDs:  3.0 cm    1.8 - 4.0 cm  Derived Variables:  LVMI: 132 g/m2  RWT: 0.91  Fractional short: 19 %  Ejection Fraction (Teicholtz): 40 %    < end of copied text >  < from: Transthoracic Echocardiogram (05.11.18 @ 15:39) >  ------------------------------------------------------------------------  CONCLUSIONS:  1. Tethered mitral valve leaflets. Minimal mitral  regurgitation.  2. Severe concentric left ventricular hypertrophy.  3. Moderate global left ventricular systolic dysfunction.  4. Right ventricular enlargement with decreased right  ventricular systolic function. A device wire is noted in  the right heart.  ------------------------------------------------------------------------    < end of copied text >    	  ASSESSMENT/PLAN:

## 2018-05-13 NOTE — PROGRESS NOTE ADULT - PROBLEM SELECTOR PLAN 1
- S/p TTE 5/11 personally reviewed and showed EF 40% with decreased RV function  - Strict I/Os.  Daily weights (unable to do standing given R sided hemiparesis) .   - Heart Failure recs appreciated will decrease IV lasix to 40 BID. Metolazone was dcd 5/11 (with last dose received on 5/10)   - Continue w/ lisinopril 5mg qd . Will switch lopressor to Metroprolol succinate 25mg qd and will add spironolactone 12.5mg qd per cards   - Low salt diet  -f/u pending labs for cardiac amyloid w/u  -Monitor BPs closely while optimizing medical therapy

## 2018-05-13 NOTE — PROGRESS NOTE ADULT - PROBLEM SELECTOR PLAN 1
-TTE strongly suggests amyloid cardiomyopathy.    -decrease lasix to 40mg IV bid  -He tolerated all HF medications: lopressor 12.5 mg po BID (for a.fib rate control) and lisinopril 5 mg po qd.  - - please convert metoprolol to succinate 25mg daily  -please add spironolactone 12.5 mg daily

## 2018-05-13 NOTE — PROGRESS NOTE ADULT - SUBJECTIVE AND OBJECTIVE BOX
Patient is a 73y old  Male who presents with a chief complaint of Swelling (08 May 2018 20:22)      SUBJECTIVE / OVERNIGHT EVENTS: patient states feels better today. Denies CP, denies SOB. Denies n/v or abdominal pain.     Review of Systems:     MEDICATIONS  (STANDING):  amiodarone    Tablet 200 milliGRAM(s) Oral daily  apixaban 5 milliGRAM(s) Oral every 12 hours  atorvastatin 20 milliGRAM(s) Oral at bedtime  brimonidine 0.2% Ophthalmic Solution 1 Drop(s) Both EYES two times a day  dorzolamide 2% Ophthalmic Solution      dorzolamide 2% Ophthalmic Solution 1 Drop(s) Both EYES three times a day  famotidine    Tablet 20 milliGRAM(s) Oral daily  furosemide   Injectable 40 milliGRAM(s) IV Push two times a day  latanoprost 0.005% Ophthalmic Solution 1 Drop(s) Both EYES at bedtime  lisinopril 5 milliGRAM(s) Oral daily  metoprolol succinate ER 25 milliGRAM(s) Oral daily  mirtazapine 45 milliGRAM(s) Oral at bedtime  potassium chloride    Tablet ER 40 milliEquivalent(s) Oral once  sodium chloride 0.9% lock flush 3 milliLiter(s) IV Push every 8 hours  spironolactone 12.5 milliGRAM(s) Oral daily    MEDICATIONS  (PRN):      PHYSICAL EXAM:  Vital Signs Last 24 Hrs  T(C): 36.6 (13 May 2018 04:52), Max: 36.7 (12 May 2018 12:15)  T(F): 97.9 (13 May 2018 04:52), Max: 98.1 (12 May 2018 12:15)  HR: 73 (13 May 2018 08:34) (63 - 75)  BP: 120/69 (13 May 2018 08:34) (86/51 - 120/69)  BP(mean): 3 (12 May 2018 20:31) (3 - 3)  RR: 16 (13 May 2018 08:34) (16 - 18)  SpO2: 100% (13 May 2018 04:52) (100% - 100%)  I&O's Summary    12 May 2018 07:01  -  13 May 2018 07:00  --------------------------------------------------------  IN: 758 mL / OUT: 2695 mL / NET: -1937 mL    13 May 2018 07:01  -  13 May 2018 10:10  --------------------------------------------------------  IN: 118 mL / OUT: 100 mL / NET: 18 mL      PHYSICAL EXAM  GENERAL: older man, responding to questions with one-word answers, NAD  HEAD:  Atraumatic, Normocephalic  EYES: EOMI, conjunctiva and sclera clear  NECK: Supple, No JVD  CHEST/LUNG: Clear to auscultation bilaterally; No wheeze  HEART: Regular rate and rhythm; No murmurs, rubs, or gallops  ABDOMEN: Soft, Nontender, Nondistended; Bowel sounds present  EXTREMITIES:  2+ Peripheral Pulses, No clubbing, cyanosis. Left  ankle > than R -otherwise no significant edema in the LE      LABS:  CAPILLARY BLOOD GLUCOSE                              9.5    9.26  )-----------( 255      ( 13 May 2018 07:00 )             28.5     05-13    140  |  93<L>  |  37<H>  ----------------------------<  84  3.1<L>   |  33<H>  |  1.54<H>    Ca    9.3      13 May 2018 06:52  Mg     2.2     05-13                RADIOLOGY & ADDITIONAL TESTS:    Imaging Personally Reviewed:    Consultant(s) Notes Reviewed:      Care Discussed with Consultants/Other Providers:

## 2018-05-14 DIAGNOSIS — I69.359 HEMIPLEGIA AND HEMIPARESIS FOLLOWING CEREBRAL INFARCTION AFFECTING UNSPECIFIED SIDE: ICD-10-CM

## 2018-05-14 DIAGNOSIS — N17.9 ACUTE KIDNEY FAILURE, UNSPECIFIED: ICD-10-CM

## 2018-05-14 DIAGNOSIS — F33.9 MAJOR DEPRESSIVE DISORDER, RECURRENT, UNSPECIFIED: ICD-10-CM

## 2018-05-14 LAB
BUN SERPL-MCNC: 40 MG/DL — HIGH (ref 7–23)
CALCIUM SERPL-MCNC: 9.1 MG/DL — SIGNIFICANT CHANGE UP (ref 8.4–10.5)
CHLORIDE SERPL-SCNC: 95 MMOL/L — LOW (ref 98–107)
CO2 SERPL-SCNC: 32 MMOL/L — HIGH (ref 22–31)
CREAT SERPL-MCNC: 1.43 MG/DL — HIGH (ref 0.5–1.3)
GLUCOSE SERPL-MCNC: 78 MG/DL — SIGNIFICANT CHANGE UP (ref 70–99)
HCT VFR BLD CALC: 32.7 % — LOW (ref 39–50)
HGB BLD-MCNC: 10.9 G/DL — LOW (ref 13–17)
MAGNESIUM SERPL-MCNC: 2.2 MG/DL — SIGNIFICANT CHANGE UP (ref 1.6–2.6)
MCHC RBC-ENTMCNC: 33.3 % — SIGNIFICANT CHANGE UP (ref 32–36)
MCHC RBC-ENTMCNC: 33.4 PG — SIGNIFICANT CHANGE UP (ref 27–34)
MCV RBC AUTO: 100.3 FL — HIGH (ref 80–100)
NRBC # FLD: 0.02 — SIGNIFICANT CHANGE UP
PLATELET # BLD AUTO: 251 K/UL — SIGNIFICANT CHANGE UP (ref 150–400)
PMV BLD: 10.9 FL — SIGNIFICANT CHANGE UP (ref 7–13)
POTASSIUM SERPL-MCNC: 3.2 MMOL/L — LOW (ref 3.5–5.3)
POTASSIUM SERPL-SCNC: 3.2 MMOL/L — LOW (ref 3.5–5.3)
RBC # BLD: 3.26 M/UL — LOW (ref 4.2–5.8)
RBC # FLD: 17.9 % — HIGH (ref 10.3–14.5)
SODIUM SERPL-SCNC: 141 MMOL/L — SIGNIFICANT CHANGE UP (ref 135–145)
WBC # BLD: 8.72 K/UL — SIGNIFICANT CHANGE UP (ref 3.8–10.5)
WBC # FLD AUTO: 8.72 K/UL — SIGNIFICANT CHANGE UP (ref 3.8–10.5)

## 2018-05-14 PROCEDURE — 99233 SBSQ HOSP IP/OBS HIGH 50: CPT

## 2018-05-14 RX ORDER — POTASSIUM CHLORIDE 20 MEQ
40 PACKET (EA) ORAL EVERY 4 HOURS
Qty: 0 | Refills: 0 | Status: COMPLETED | OUTPATIENT
Start: 2018-05-14 | End: 2018-05-14

## 2018-05-14 RX ORDER — SODIUM CHLORIDE 9 MG/ML
500 INJECTION INTRAMUSCULAR; INTRAVENOUS; SUBCUTANEOUS ONCE
Qty: 0 | Refills: 0 | Status: COMPLETED | OUTPATIENT
Start: 2018-05-14 | End: 2018-05-14

## 2018-05-14 RX ORDER — FUROSEMIDE 40 MG
40 TABLET ORAL EVERY 12 HOURS
Qty: 0 | Refills: 0 | Status: DISCONTINUED | OUTPATIENT
Start: 2018-05-14 | End: 2018-05-14

## 2018-05-14 RX ORDER — SPIRONOLACTONE 25 MG/1
25 TABLET, FILM COATED ORAL DAILY
Qty: 0 | Refills: 0 | Status: DISCONTINUED | OUTPATIENT
Start: 2018-05-15 | End: 2018-05-15

## 2018-05-14 RX ORDER — MIDODRINE HYDROCHLORIDE 2.5 MG/1
5 TABLET ORAL ONCE
Qty: 0 | Refills: 0 | Status: DISCONTINUED | OUTPATIENT
Start: 2018-05-14 | End: 2018-05-14

## 2018-05-14 RX ORDER — METOPROLOL TARTRATE 50 MG
12.5 TABLET ORAL DAILY
Qty: 0 | Refills: 0 | Status: DISCONTINUED | OUTPATIENT
Start: 2018-05-15 | End: 2018-05-15

## 2018-05-14 RX ADMIN — SODIUM CHLORIDE 3 MILLILITER(S): 9 INJECTION INTRAMUSCULAR; INTRAVENOUS; SUBCUTANEOUS at 12:22

## 2018-05-14 RX ADMIN — MIRTAZAPINE 45 MILLIGRAM(S): 45 TABLET, ORALLY DISINTEGRATING ORAL at 22:43

## 2018-05-14 RX ADMIN — LATANOPROST 1 DROP(S): 0.05 SOLUTION/ DROPS OPHTHALMIC; TOPICAL at 22:44

## 2018-05-14 RX ADMIN — BRIMONIDINE TARTRATE 1 DROP(S): 2 SOLUTION/ DROPS OPHTHALMIC at 17:19

## 2018-05-14 RX ADMIN — BRIMONIDINE TARTRATE 1 DROP(S): 2 SOLUTION/ DROPS OPHTHALMIC at 05:50

## 2018-05-14 RX ADMIN — SODIUM CHLORIDE 125 MILLILITER(S): 9 INJECTION INTRAMUSCULAR; INTRAVENOUS; SUBCUTANEOUS at 19:17

## 2018-05-14 RX ADMIN — LISINOPRIL 5 MILLIGRAM(S): 2.5 TABLET ORAL at 05:46

## 2018-05-14 RX ADMIN — SPIRONOLACTONE 12.5 MILLIGRAM(S): 25 TABLET, FILM COATED ORAL at 05:50

## 2018-05-14 RX ADMIN — Medication 40 MILLIEQUIVALENT(S): at 13:04

## 2018-05-14 RX ADMIN — DORZOLAMIDE HYDROCHLORIDE 1 DROP(S): 20 SOLUTION/ DROPS OPHTHALMIC at 22:44

## 2018-05-14 RX ADMIN — Medication 25 MILLIGRAM(S): at 05:46

## 2018-05-14 RX ADMIN — Medication 40 MILLIEQUIVALENT(S): at 09:01

## 2018-05-14 RX ADMIN — DORZOLAMIDE HYDROCHLORIDE 1 DROP(S): 20 SOLUTION/ DROPS OPHTHALMIC at 05:50

## 2018-05-14 RX ADMIN — APIXABAN 5 MILLIGRAM(S): 2.5 TABLET, FILM COATED ORAL at 05:46

## 2018-05-14 RX ADMIN — Medication 40 MILLIGRAM(S): at 05:51

## 2018-05-14 RX ADMIN — ATORVASTATIN CALCIUM 20 MILLIGRAM(S): 80 TABLET, FILM COATED ORAL at 22:44

## 2018-05-14 RX ADMIN — FAMOTIDINE 20 MILLIGRAM(S): 10 INJECTION INTRAVENOUS at 11:58

## 2018-05-14 RX ADMIN — APIXABAN 5 MILLIGRAM(S): 2.5 TABLET, FILM COATED ORAL at 17:19

## 2018-05-14 RX ADMIN — DORZOLAMIDE HYDROCHLORIDE 1 DROP(S): 20 SOLUTION/ DROPS OPHTHALMIC at 13:04

## 2018-05-14 RX ADMIN — SODIUM CHLORIDE 3 MILLILITER(S): 9 INJECTION INTRAMUSCULAR; INTRAVENOUS; SUBCUTANEOUS at 21:29

## 2018-05-14 RX ADMIN — AMIODARONE HYDROCHLORIDE 200 MILLIGRAM(S): 400 TABLET ORAL at 05:46

## 2018-05-14 RX ADMIN — SODIUM CHLORIDE 3 MILLILITER(S): 9 INJECTION INTRAMUSCULAR; INTRAVENOUS; SUBCUTANEOUS at 05:45

## 2018-05-14 NOTE — PROGRESS NOTE ADULT - SUBJECTIVE AND OBJECTIVE BOX
Patient is a 73y old  Male who presents with a chief complaint of Swelling (08 May 2018 20:22)      SUBJECTIVE / OVERNIGHT EVENTS: patient states feels better today. Denies CP, denies SOB. Denies n/v or abdominal pain. He wants to go home.     Review of Systems: As per above     MEDICATIONS  (STANDING):  amiodarone    Tablet 200 milliGRAM(s) Oral daily  apixaban 5 milliGRAM(s) Oral every 12 hours  atorvastatin 20 milliGRAM(s) Oral at bedtime  brimonidine 0.2% Ophthalmic Solution 1 Drop(s) Both EYES two times a day  dorzolamide 2% Ophthalmic Solution      dorzolamide 2% Ophthalmic Solution 1 Drop(s) Both EYES three times a day  famotidine    Tablet 20 milliGRAM(s) Oral daily  furosemide    Tablet 40 milliGRAM(s) Oral every 12 hours  latanoprost 0.005% Ophthalmic Solution 1 Drop(s) Both EYES at bedtime  lisinopril 5 milliGRAM(s) Oral daily  mirtazapine 45 milliGRAM(s) Oral at bedtime  sodium chloride 0.9% lock flush 3 milliLiter(s) IV Push every 8 hours    MEDICATIONS  (PRN):      PHYSICAL EXAM:  Vital Signs Last 24 Hrs  T(C): 36.4 (14 May 2018 11:57), Max: 37.6 (13 May 2018 22:33)  T(F): 97.6 (14 May 2018 11:57), Max: 99.6 (13 May 2018 22:33)  HR: 75 (14 May 2018 11:57) (66 - 75)  BP: 96/57 (14 May 2018 11:57) (82/50 - 108/65)  BP(mean): --  RR: 18 (14 May 2018 11:57) (16 - 18)  SpO2: 100% (14 May 2018 11:57) (100% - 100%)    I&O's Detail    13 May 2018 07:01  -  14 May 2018 07:00  --------------------------------------------------------  IN:    Oral Fluid: 594 mL  Total IN: 594 mL    OUT:    Indwelling Catheter - Urethral: 425 mL  Total OUT: 425 mL    Total NET: 169 mL  PHYSICAL EXAM  GENERAL: older man, responding to questions with one-word answers, NAD  HEAD:  Atraumatic, Normocephalic  EYES: EOMI, conjunctiva and sclera clear  NECK: Supple, JVP mild  CHEST/LUNG: Clear to auscultation bilaterally; No wheeze  HEART: Regular rate and rhythm; No murmurs, rubs, or gallops  ABDOMEN: Soft, Nontender, Nondistended; Bowel sounds present  EXTREMITIES:  2+ Peripheral Pulses, No clubbing, cyanosis. \  NEURO: Expressive aphasia     LABS:                        10.9   8.72  )-----------( 251      ( 14 May 2018 06:34 )             32.7   05-14    141  |  95<L>  |  40<H>  ----------------------------<  78  3.2<L>   |  32<H>  |  1.43<H>    Ca    9.1      14 May 2018 06:34  Mg     2.2     05-14      RADIOLOGY & ADDITIONAL TESTS:    Imaging Personally Reviewed:    Consultant(s) Notes Reviewed:  CHF    Care Discussed with Consultants/Other Providers: CHF team

## 2018-05-14 NOTE — PROGRESS NOTE ADULT - PROBLEM SELECTOR PLAN 1
- S/p TTE 5/11 personally reviewed and showed EF 40% with decreased RV function; concern for amyloidosis per cardio (appreciated)  - Strict I/Os. Daily weights (unable to do standing given R sided hemiparesis) .   - CHF recs appreciated; C/w Lasix 40mg BID with possible transition to Torsemide tomorrow   - Continue w/ lisinopril 5mg qd  - Decrease Toprol-Xl to 12.5mg daily  - Increase spironolactone to 25mg daily  - Low salt diet  - f/u pending labs for cardiac amyloid w/u  - Monitor BPs closely while optimizing medical therapy

## 2018-05-14 NOTE — PROGRESS NOTE ADULT - PROBLEM SELECTOR PLAN 1
-TTE strongly suggests amyloid cardiomyopathy.   -Volume status greatly improved. But JVP approximally 12, could be related elevated pressure from stiff atria/amyloid.  -He has missed Lasix and one dose of lopressor yesterday. SBP and HR on the lower side. Would decrease Toprol to 12.5 mg po qd (especially in setting of possible cardiac amyloid).  -Hypokelemic 3.2. Agree to supplement w/ potassium chloride, keep K 4.0-4.5.  Increase Domo to 25 mg po qd.  -HF education given to family by bedside. -TTE strongly suggests amyloid cardiomyopathy.   -Volume status greatly improved. But JVP approximally 12, could be related elevated pressure from stiff atria/amyloid.  -He has missed Lasix and one dose of lopressor yesterday. SBP and HR on the lower side. Would decrease Toprol to 12.5 mg po qd (especially in setting of possible cardiac amyloid). Continue Laisx for now. May switch him over to Torsemide 40 mg po qd, as wife states he often refuses evening meds, and this maybe leading to volume overload and frequent decompensation.   -Hypokelemic 3.2. Agree to supplement w/ potassium chloride, keep K 4.0-4.5.  Increase Domo to 25 mg po qd.  -HF education given to family by bedside.

## 2018-05-14 NOTE — PROGRESS NOTE ADULT - ATTENDING COMMENTS
Dispo- Pending; PT rec MICHEAL but wife and patient want DC home with eldercare when medically stable. Await further CHF recs

## 2018-05-14 NOTE — PROGRESS NOTE ADULT - SUBJECTIVE AND OBJECTIVE BOX
Patient seen and examined. He does not have any complaints. No SOB, orthopnea, PND, CP, palpitations, dizziness. Wife by bedside. She was not aware of HF diet and fluid restriction, was giving him lots of fluid at home. HF education given >30 minutes.     Medications:  amiodarone    Tablet 200 milliGRAM(s) Oral daily  apixaban 5 milliGRAM(s) Oral every 12 hours  atorvastatin 20 milliGRAM(s) Oral at bedtime  brimonidine 0.2% Ophthalmic Solution 1 Drop(s) Both EYES two times a day  dorzolamide 2% Ophthalmic Solution      dorzolamide 2% Ophthalmic Solution 1 Drop(s) Both EYES three times a day  famotidine    Tablet 20 milliGRAM(s) Oral daily  furosemide    Tablet 40 milliGRAM(s) Oral every 12 hours  latanoprost 0.005% Ophthalmic Solution 1 Drop(s) Both EYES at bedtime  lisinopril 5 milliGRAM(s) Oral daily  metoprolol succinate ER 25 milliGRAM(s) Oral daily  mirtazapine 45 milliGRAM(s) Oral at bedtime  potassium chloride    Tablet ER 40 milliEquivalent(s) Oral every 4 hours  sodium chloride 0.9% lock flush 3 milliLiter(s) IV Push every 8 hours  spironolactone 12.5 milliGRAM(s) Oral daily      Vitals:  Vital Signs Last 24 Hrs  T(C): 36.4 (14 May 2018 11:57), Max: 37.6 (13 May 2018 22:33)  T(F): 97.6 (14 May 2018 11:57), Max: 99.6 (13 May 2018 22:33)  HR: 75 (14 May 2018 11:57) (66 - 75)  BP: 96/57 (14 May 2018 11:57) (82/50 - 108/65)    RR: 18 (14 May 2018 11:57) (16 - 18)  SpO2: 100% (14 May 2018 11:57) (99% - 100%)    Daily     Daily Weight in k.2 (14 May 2018 01:59)    I&O's Detail    13 May 2018 07:01  -  14 May 2018 07:00  --------------------------------------------------------  IN:    Oral Fluid: 594 mL  Total IN: 594 mL    OUT:    Indwelling Catheter - Urethral: 425 mL  Total OUT: 425 mL    Total NET: 169 mL          Physical Exam:     General: No distress. Comfortable.  HEENT: EOM intact.  Neck: Neck supple. JVP about 12. No masses  Chest: Clear to auscultation bilaterally  CV: Irregularly irregular,  No murmurs, rub, or gallops. Radial pulses normal. No LE edema b/l.   Abdomen: Soft, non-distended, non-tender  Skin: No rashes or skin breakdown  Neurology: Alert and oriented times three. Sensation intact  Psych: Affect normal    Labs:                        10.9   8.72  )-----------( 251      ( 14 May 2018 06:34 )             32.7     05-14    141  |  95<L>  |  40<H>  ----------------------------<  78  3.2<L>   |  32<H>  |  1.43<H>    Ca    9.1      14 May 2018 06:34  Mg     2.2     05-14    < from: Transthoracic Echocardiogram (18 @ 15:39) >  DIMENSIONS:  Dimensions:     Normal Values:  LA:     4.7 cm    2.0 - 4.0 cm  Ao:     2.5 cm    2.0 - 3.8 cm  SEPTUM: 1.5 cm    0.6 - 1.2 cm  PWT:    1.7 cm    0.6 - 1.1 cm  LVIDd:  3.7 cm    3.0 - 5.6 cm  LVIDs:  3.0 cm    1.8 - 4.0 cm  Derived Variables:  LVMI: 132 g/m2  RWT: 0.91  Fractional short: 19 %  Ejection Fraction (Teicholtz): 40 %  ------------------------------------------------------------------------  OBSERVATIONS:  Mitral Valve: Tethered mitral valve leaflets. Minimal  mitral regurgitation.  Aortic Root: Normal aortic root.  Aortic Valve: Aortic valve not well visualized.  Left Atrium: Severely dilated leftatrium.  LA volume index  = 60 cc/m2.  Left Ventricle: Moderate global left ventricular systolic  dysfunction. Severe concentric left ventricular  hypertrophy.  Right Heart: Moderate right atrial enlargement. Right  ventricular enlargement with decreased right ventricular  systolic function. A device wire is noted in the right  heart. Normal tricuspid valve. Mild tricuspid  regurgitation. Normal pulmonic valve.  Pericardium/PleuraNormal pericardium with no pericardial  effusion.    < end of copied text >

## 2018-05-14 NOTE — PROGRESS NOTE ADULT - ATTENDING COMMENTS
Echo reviewed. Likely amyloid.  Will inquire about scintigraphy, as pt has MRI.  ?fat pad biopsy.  ICD interrogation (ECG shows grouped beating).  Change Lasix to torsemide 40 qd.

## 2018-05-14 NOTE — CHART NOTE - NSCHARTNOTEFT_GEN_A_CORE
Called by RN for patient BP 79/50, patient asymptomatic, remainder of vital signs stable. Mental status at baseline per RN. Patient denies dizziness, sob, chest pain or pressure. No event on tele corresponding with hypotension. Holding off on fluids for now, compression stockings and leg elevation to increase venous return. Will monitor and reassess shortly. Called by RN for patient BP 79/50, patient asymptomatic, remainder of vital signs stable. Mental status at baseline per RN. Patient denies dizziness, sob, chest pain or pressure. No event on tele corresponding with hypotension. Holding off on fluids for now, compression stockings and leg elevation to increase venous return. Will monitor and reassess shortly.    Addendum: Dr. Cohen on floor during episode, reviewed plan of care, will monitor Blood pressure for now, no more diuretic today. Will send transthyretin mutation test to [facility] for possible amyloid. Called by RN for patient BP 79/50, patient asymptomatic, remainder of vital signs stable. Mental status at baseline per RN. Patient denies dizziness, sob, chest pain or pressure. No event on tele corresponding with hypotension. Holding off on fluids for now, compression stockings and leg elevation to increase venous return. Will monitor and reassess shortly.    Addendum: Dr. Cohen on floor during episode, reviewed plan of care, will monitor Blood pressure for now, no more diuretic today. Will send transthyretin mutation test to [facility] for possible amyloid.    Addendum: Re-reviewed with Dr. Cohen as BP sustained 70-80 systolic.  500cc NS ordered.

## 2018-05-15 DIAGNOSIS — I48.0 PAROXYSMAL ATRIAL FIBRILLATION: ICD-10-CM

## 2018-05-15 DIAGNOSIS — N17.9 ACUTE KIDNEY FAILURE, UNSPECIFIED: ICD-10-CM

## 2018-05-15 LAB
BUN SERPL-MCNC: 49 MG/DL — HIGH (ref 7–23)
CALCIUM SERPL-MCNC: 9 MG/DL — SIGNIFICANT CHANGE UP (ref 8.4–10.5)
CHLORIDE SERPL-SCNC: 100 MMOL/L — SIGNIFICANT CHANGE UP (ref 98–107)
CO2 SERPL-SCNC: 23 MMOL/L — SIGNIFICANT CHANGE UP (ref 22–31)
CREAT SERPL-MCNC: 1.89 MG/DL — HIGH (ref 0.5–1.3)
GAS PNL BLDMV: SIGNIFICANT CHANGE UP
GAS PNL BLDMV: SIGNIFICANT CHANGE UP
GLUCOSE SERPL-MCNC: 91 MG/DL — SIGNIFICANT CHANGE UP (ref 70–99)
HCT VFR BLD CALC: 34.2 % — LOW (ref 39–50)
HGB BLD-MCNC: 11.2 G/DL — LOW (ref 13–17)
KAPPA/LAMBDA FREE LIGHT CHAIN RATIO, SERUM: 0.42 — SIGNIFICANT CHANGE UP
MAGNESIUM SERPL-MCNC: 2.3 MG/DL — SIGNIFICANT CHANGE UP (ref 1.6–2.6)
MCHC RBC-ENTMCNC: 32.7 % — SIGNIFICANT CHANGE UP (ref 32–36)
MCHC RBC-ENTMCNC: 32.8 PG — SIGNIFICANT CHANGE UP (ref 27–34)
MCV RBC AUTO: 100.3 FL — HIGH (ref 80–100)
NRBC # FLD: 0.02 — SIGNIFICANT CHANGE UP
PLATELET # BLD AUTO: 260 K/UL — SIGNIFICANT CHANGE UP (ref 150–400)
PMV BLD: 10.9 FL — SIGNIFICANT CHANGE UP (ref 7–13)
POTASSIUM SERPL-MCNC: 5.1 MMOL/L — SIGNIFICANT CHANGE UP (ref 3.5–5.3)
POTASSIUM SERPL-SCNC: 5.1 MMOL/L — SIGNIFICANT CHANGE UP (ref 3.5–5.3)
RBC # BLD: 3.41 M/UL — LOW (ref 4.2–5.8)
RBC # FLD: 18 % — HIGH (ref 10.3–14.5)
SODIUM SERPL-SCNC: 141 MMOL/L — SIGNIFICANT CHANGE UP (ref 135–145)
WBC # BLD: 10.19 K/UL — SIGNIFICANT CHANGE UP (ref 3.8–10.5)
WBC # FLD AUTO: 10.19 K/UL — SIGNIFICANT CHANGE UP (ref 3.8–10.5)

## 2018-05-15 PROCEDURE — 99223 1ST HOSP IP/OBS HIGH 75: CPT | Mod: GC

## 2018-05-15 PROCEDURE — 93284 PRGRMG EVAL IMPLANTABLE DFB: CPT | Mod: 26

## 2018-05-15 PROCEDURE — 99232 SBSQ HOSP IP/OBS MODERATE 35: CPT

## 2018-05-15 PROCEDURE — 99233 SBSQ HOSP IP/OBS HIGH 50: CPT

## 2018-05-15 RX ORDER — APIXABAN 2.5 MG/1
2.5 TABLET, FILM COATED ORAL EVERY 12 HOURS
Qty: 0 | Refills: 0 | Status: DISCONTINUED | OUTPATIENT
Start: 2018-05-15 | End: 2018-05-17

## 2018-05-15 RX ORDER — LISINOPRIL 2.5 MG/1
2.5 TABLET ORAL DAILY
Qty: 0 | Refills: 0 | Status: DISCONTINUED | OUTPATIENT
Start: 2018-05-15 | End: 2018-05-15

## 2018-05-15 RX ADMIN — BRIMONIDINE TARTRATE 1 DROP(S): 2 SOLUTION/ DROPS OPHTHALMIC at 17:16

## 2018-05-15 RX ADMIN — SPIRONOLACTONE 25 MILLIGRAM(S): 25 TABLET, FILM COATED ORAL at 06:11

## 2018-05-15 RX ADMIN — ATORVASTATIN CALCIUM 20 MILLIGRAM(S): 80 TABLET, FILM COATED ORAL at 22:28

## 2018-05-15 RX ADMIN — BRIMONIDINE TARTRATE 1 DROP(S): 2 SOLUTION/ DROPS OPHTHALMIC at 06:11

## 2018-05-15 RX ADMIN — SODIUM CHLORIDE 3 MILLILITER(S): 9 INJECTION INTRAMUSCULAR; INTRAVENOUS; SUBCUTANEOUS at 06:08

## 2018-05-15 RX ADMIN — APIXABAN 2.5 MILLIGRAM(S): 2.5 TABLET, FILM COATED ORAL at 17:16

## 2018-05-15 RX ADMIN — AMIODARONE HYDROCHLORIDE 200 MILLIGRAM(S): 400 TABLET ORAL at 06:11

## 2018-05-15 RX ADMIN — DORZOLAMIDE HYDROCHLORIDE 1 DROP(S): 20 SOLUTION/ DROPS OPHTHALMIC at 06:11

## 2018-05-15 RX ADMIN — SODIUM CHLORIDE 3 MILLILITER(S): 9 INJECTION INTRAMUSCULAR; INTRAVENOUS; SUBCUTANEOUS at 13:03

## 2018-05-15 RX ADMIN — DORZOLAMIDE HYDROCHLORIDE 1 DROP(S): 20 SOLUTION/ DROPS OPHTHALMIC at 22:28

## 2018-05-15 RX ADMIN — MIRTAZAPINE 45 MILLIGRAM(S): 45 TABLET, ORALLY DISINTEGRATING ORAL at 22:28

## 2018-05-15 RX ADMIN — LATANOPROST 1 DROP(S): 0.05 SOLUTION/ DROPS OPHTHALMIC; TOPICAL at 22:27

## 2018-05-15 RX ADMIN — APIXABAN 5 MILLIGRAM(S): 2.5 TABLET, FILM COATED ORAL at 06:11

## 2018-05-15 NOTE — CONSULT NOTE ADULT - SUBJECTIVE AND OBJECTIVE BOX
[facility] DIVISION OF KIDNEY DISEASES AND HYPERTENSION -- INITIAL CONSULT NOTE  --------------------------------------------------------------------------------  HPI:  Patient is a 72 y/o man with history afib on A/C, CVA with residual right hemiparesis, CHF reduced EF, HTN prostate CA who presents with increased edema.  Patient is unable to provide history, history of expressive aphasia.  Wife at bedside who provided history in addition to medical record.  Patient was discharged in late April from rehab and was found with mild edema on discharge home.  However over the next several weeks edema continued to increase.  Patient denies any difficulty breathing on admission, but per medical record patient had +orthopnea and was brought to the ED CHF.  Patient had CT A/P w/ IV contrast in evening of 5/8 for abdominal pain.  Creatinine 1.11 on admission.  Creatinine 1.49 on 5/11 in the setting of effective diuresis and significant improvement in lower extremity edema per patient's wife.  Creatinine today of 1.89 and renal consulted due to positive findings on BRYCE workup.        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  CHF (congestive heart failure): With EF of 20%  Atrial fibrillation  History of hip replacement: left hip replacement 2009    FAMILY HISTORY:  Family history of cancer (Sibling)    PAST SOCIAL HISTORY:  Denies any history of smoking, etoh, illicit substance abuse    ALLERGIES & MEDICATIONS  --------------------------------------------------------------------------------  Allergies    No Known Allergies    Intolerances      Standing Inpatient Medications  amiodarone    Tablet 200 milliGRAM(s) Oral daily  apixaban 5 milliGRAM(s) Oral every 12 hours  atorvastatin 20 milliGRAM(s) Oral at bedtime  brimonidine 0.2% Ophthalmic Solution 1 Drop(s) Both EYES two times a day  dorzolamide 2% Ophthalmic Solution      dorzolamide 2% Ophthalmic Solution 1 Drop(s) Both EYES three times a day  famotidine    Tablet 20 milliGRAM(s) Oral daily  latanoprost 0.005% Ophthalmic Solution 1 Drop(s) Both EYES at bedtime  lisinopril 2.5 milliGRAM(s) Oral daily  mirtazapine 45 milliGRAM(s) Oral at bedtime  sodium chloride 0.9% lock flush 3 milliLiter(s) IV Push every 8 hours  spironolactone 25 milliGRAM(s) Oral daily    PRN Inpatient Medications      REVIEW OF SYSTEMS  --------------------------------------------------------------------------------  Gen: No fevers/chills, weakness, fatigue  Skin: No rashes  Head/Eyes/Ears/Mouth: No headache, no sore throat  Respiratory: +dyspnea, no cough  CV: No chest pain, +orthopnea, +exertional symptoms  GI: No abdominal pain, diarrhea  : No increased frequency, dysuria  MSK: No joint pain/swelling; +whole body edema  Neuro: No dizziness/lightheadedness  Heme: No easy bruising or bleeding  Endo: No heat/cold intolerance    All other systems were reviewed and are negative, except as noted.    VITALS/PHYSICAL EXAM  --------------------------------------------------------------------------------  T(C): 36.7 (05-15-18 @ 13:03), Max: 36.7 (05-15-18 @ 13:03)  HR: 52 (05-15-18 @ 13:03) (50 - 53)  BP: 93/58 (05-15-18 @ 13:03) (70/42 - 98/60)  RR: 18 (05-15-18 @ 13:03) (18 - 18)  SpO2: 100% (05-15-18 @ 13:03) (97% - 100%)  Wt(kg): --        05-14-18 @ 07:01  -  05-15-18 @ 07:00  --------------------------------------------------------  IN: 820 mL / OUT: 0 mL / NET: 820 mL      Physical Exam:  	Gen: NAD, well-appearing  	HEENT: MMM  	Pulm: CTA B/L  	CV: RRR, S1S2; no rub  	Abd: +BS, soft, nontender/nondistended  	: No suprapubic tenderness  	UE: Warm, no edema  	LE: Warm, no edema  	Neuro: poor recal  	Psych: Normal affect and mood  	Skin: Warm, without rashes    LABS/STUDIES  --------------------------------------------------------------------------------              11.2   10.19 >-----------<  260      [05-15-18 @ 06:19]              34.2     141  |  100  |  49  ----------------------------<  91      [05-15-18 @ 06:19]  5.1   |  23  |  1.89        Ca     9.0     [05-15-18 @ 06:19]      Mg     2.3     [05-15-18 @ 06:19]    Creatinine Trend:  SCr 1.89 [05-15 @ 06:19]  SCr 1.43 [05-14 @ 06:34]  SCr 1.54 [05-13 @ 06:52]  SCr 1.60 [05-12 @ 06:14]  SCr 1.49 [05-11 @ 07:10] [facility] DIVISION OF KIDNEY DISEASES AND HYPERTENSION -- INITIAL CONSULT NOTE  --------------------------------------------------------------------------------  HPI:  Patient is a 74 y/o man with history afib on A/C, CVA with residual right hemiparesis, CHF reduced EF, HTN prostate CA who presents with increased edema.  Patient is unable to provide history, history of expressive aphasia.  Wife at bedside who provided history in addition to medical record.  Patient was discharged in late April from rehab and was found with mild edema on discharge home.  However over the next several weeks edema continued to increase.  Patient denies any difficulty breathing on admission, but per medical record patient had +orthopnea and was brought to the ED CHF.  Patient had CT A/P w/ IV contrast in evening of 5/8 for abdominal pain.  Creatinine 1.11 on admission.  Creatinine 1.49 on 5/11 in the setting of effective diuresis and significant improvement in lower extremity edema per patient's wife.  Creatinine today of 1.89 and renal consulted due to positive findings on BRYCE workup.        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  CHF (congestive heart failure): With EF of 20%  Atrial fibrillation  History of hip replacement: left hip replacement 2009    FAMILY HISTORY:  Family history of cancer (Sibling)    PAST SOCIAL HISTORY:  Denies any history of smoking, etoh, illicit substance abuse    ALLERGIES & MEDICATIONS  --------------------------------------------------------------------------------  Allergies    No Known Allergies    Intolerances      Standing Inpatient Medications  amiodarone    Tablet 200 milliGRAM(s) Oral daily  apixaban 5 milliGRAM(s) Oral every 12 hours  atorvastatin 20 milliGRAM(s) Oral at bedtime  brimonidine 0.2% Ophthalmic Solution 1 Drop(s) Both EYES two times a day  dorzolamide 2% Ophthalmic Solution      dorzolamide 2% Ophthalmic Solution 1 Drop(s) Both EYES three times a day  famotidine    Tablet 20 milliGRAM(s) Oral daily  latanoprost 0.005% Ophthalmic Solution 1 Drop(s) Both EYES at bedtime  lisinopril 2.5 milliGRAM(s) Oral daily  mirtazapine 45 milliGRAM(s) Oral at bedtime  sodium chloride 0.9% lock flush 3 milliLiter(s) IV Push every 8 hours  spironolactone 25 milliGRAM(s) Oral daily    PRN Inpatient Medications      REVIEW OF SYSTEMS  --------------------------------------------------------------------------------  Gen: minimal verbalization  Skin: No rashes  Head/Eyes/Ears/Mouth: No headache,   Respiratory: +dyspnea, no cough  CV: No chest pain, +orthopnea, +exertional symptoms  GI: No abdominal pain,   MSK: ; +whole body edema  Neuro: No dizziness/lightheadedness  Heme: No easy bruising or bleeding      All other systems were reviewed and are negative, except as noted.    VITALS/PHYSICAL EXAM  --------------------------------------------------------------------------------  T(C): 36.7 (05-15-18 @ 13:03), Max: 36.7 (05-15-18 @ 13:03)  HR: 52 (05-15-18 @ 13:03) (50 - 53)  BP: 93/58 (05-15-18 @ 13:03) (70/42 - 98/60)  RR: 18 (05-15-18 @ 13:03) (18 - 18)  SpO2: 100% (05-15-18 @ 13:03) (97% - 100%)  Wt(kg): --        05-14-18 @ 07:01  -  05-15-18 @ 07:00  --------------------------------------------------------  IN: 820 mL / OUT: 0 mL / NET: 820 mL      Physical Exam:  	Gen: NAD,   	HEENT: MMM  	Pulm: CTA B/L  	CV: RRR, S1S2; no rub  	Abd: +BS, soft, nontender/nondistended  	: No suprapubic tenderness  	UE: Warm, no edema  	LE: Warm, no edema  	Neuro: poor recal  	Psych: Normal affect and mood  	Skin: Warm, without rashes    LABS/STUDIES  --------------------------------------------------------------------------------              11.2   10.19 >-----------<  260      [05-15-18 @ 06:19]              34.2     141  |  100  |  49  ----------------------------<  91      [05-15-18 @ 06:19]  5.1   |  23  |  1.89        Ca     9.0     [05-15-18 @ 06:19]      Mg     2.3     [05-15-18 @ 06:19]    Creatinine Trend:  SCr 1.89 [05-15 @ 06:19]  SCr 1.43 [05-14 @ 06:34]  SCr 1.54 [05-13 @ 06:52]  SCr 1.60 [05-12 @ 06:14]  SCr 1.49 [05-11 @ 07:10]

## 2018-05-15 NOTE — PROGRESS NOTE ADULT - PROBLEM SELECTOR PLAN 2
Baseline SCr appears to be ~1.1; likely due to ATN from volume contraction and hypotension   - SCr rising; up to 1.89 today   - Renal consult placed; will await recs  - Monitor BMP  - Hold diuretics and toprol  - Keep MAP > 65

## 2018-05-15 NOTE — PROGRESS NOTE ADULT - SUBJECTIVE AND OBJECTIVE BOX
ELECTROPHYSIOLOGY  Device Interrogation Performed                                  Date/Time: 5/15/18 12:20 pm  :  [employer] CRT-D                       Model: [employer] X4          Mode: DDD      Rate:                                                                                                                                                  Total BIV pacin%      Atrial Lead:  P wave amplitude: 1.0          mv          Impedence:  765      Ohms      Threshold:   0.7             V@ 0.50            ms      Ventricular Lead(s):  RV Lead: R wave amplitude:  paced       mv          Impedence:   366         Ohms      Threshold:  0.7    V@   0.5  ms   LV Lead:  R wave amplitude:    paced     mv          Impedence:  597           Ohms      Threshold: 0.6   V@   0.5      ms     Battery Status:      X               Good                ABEBE                     EOL    Underlying Rhythm:   Pacer dependent    Events/Observation:   Far field sensing recorded as ATR     Impression/Plan:  Normal PPM / ICD function.   Normal sensing and pacing via iterative testing. Good battery status. Excellent threshold capture.  No reprogramming.   No events corresponding to this admission

## 2018-05-15 NOTE — PROGRESS NOTE ADULT - PROBLEM SELECTOR PLAN 1
- S/p TTE 5/11 personally reviewed and showed EF 40% with decreased RV function; concern for amyloidosis per cardio (appreciated)  - Strict I/Os. Daily weights (unable to do standing given R sided hemiparesis) .   - CHF recs appreciated; C/w Lasix 40mg BID with possible transition to Torsemide tomorrow   - Continue w/ lisinopril 5mg qd  - Decrease Toprol-Xl to 12.5mg daily  - Increase spironolactone to 25mg daily  - Low salt diet  - f/u pending labs for cardiac amyloid w/u  - Monitor BPs closely while optimizing medical therapy - S/p TTE 5/11 personally reviewed and showed EF 40% with decreased RV function; concern for amyloidosis per cardio (appreciated)  - Strict I/Os. Daily weights (unable to do standing given R sided hemiparesis) .   - CHF recs appreciated; will hold all diuretics and toprol for now given hypotension and BRYCE  - Decrease lisinopril to 2.5mg qdaily   - Low salt diet  - f/u pending labs for cardiac amyloid w/u; serum transthyretin   - Monitor BPs closely while optimizing medical therapy

## 2018-05-15 NOTE — PROGRESS NOTE ADULT - SUBJECTIVE AND OBJECTIVE BOX
Patient is a 73y old  Male who presents with a chief complaint of Swelling (08 May 2018 20:22)    SUBJECTIVE / OVERNIGHT EVENTS: patient states feels better today. Patient hypotensive yesterday evening to SBP 70's/80's and received 500cc NS bolus with improvement. Denies CP, denies SOB. Denies n/v or abdominal pain. He wants to go home.     Review of Systems: As per above     MEDICATIONS  (STANDING):  amiodarone    Tablet 200 milliGRAM(s) Oral daily  apixaban 5 milliGRAM(s) Oral every 12 hours  atorvastatin 20 milliGRAM(s) Oral at bedtime  brimonidine 0.2% Ophthalmic Solution 1 Drop(s) Both EYES two times a day  dorzolamide 2% Ophthalmic Solution      dorzolamide 2% Ophthalmic Solution 1 Drop(s) Both EYES three times a day  famotidine    Tablet 20 milliGRAM(s) Oral daily  latanoprost 0.005% Ophthalmic Solution 1 Drop(s) Both EYES at bedtime  lisinopril 2.5 milliGRAM(s) Oral daily  mirtazapine 45 milliGRAM(s) Oral at bedtime  sodium chloride 0.9% lock flush 3 milliLiter(s) IV Push every 8 hours  spironolactone 25 milliGRAM(s) Oral daily        PHYSICAL EXAM:  Vital Signs Last 24 Hrs  T(C): 36.2 (15 May 2018 06:09), Max: 36.2 (14 May 2018 21:23)  T(F): 97.2 (15 May 2018 06:09), Max: 97.2 (14 May 2018 21:23)  HR: 50 (15 May 2018 09:25) (50 - 53)  BP: 93/50 (15 May 2018 09:25) (70/42 - 98/60)  BP(mean): --  RR: 18 (15 May 2018 06:09) (18 - 18)  SpO2: 100% (15 May 2018 06:09) (97% - 100%)    I&O's Detail    14 May 2018 07:01  -  15 May 2018 07:00  --------------------------------------------------------  IN:    Oral Fluid: 320 mL    Sodium Chloride 0.9% IV Bolus: 500 mL  Total IN: 820 mL    OUT:  Total OUT: 0 mL    Total NET: 820 mL    PHYSICAL EXAM  GENERAL: older man, responding to questions with one-word answers, NAD  HEAD:  Atraumatic, Normocephalic  EYES: EOMI, conjunctiva and sclera clear  NECK: Supple, JVP mild  CHEST/LUNG: Clear to auscultation bilaterally; No wheeze  HEART: Regular rate and rhythm; No murmurs, rubs, or gallops  ABDOMEN: Soft, Nontender, Nondistended; Bowel sounds present  EXTREMITIES:  2+ Peripheral Pulses, No clubbing, cyanosis.   NEURO: Expressive aphasia     LABS:                                   11.2   10.19 )-----------( 260      ( 15 May 2018 06:19 )             34.2   05-15    141  |  100  |  49<H>  ----------------------------<  91  5.1   |  23  |  1.89<H>    Ca    9.0      15 May 2018 06:19  Mg     2.3     05-15      RADIOLOGY & ADDITIONAL TESTS:    Imaging Personally Reviewed:    Consultant(s) Notes Reviewed:  CHF    Care Discussed with Consultants/Other Providers: CHF team/tele PA

## 2018-05-15 NOTE — CONSULT NOTE ADULT - ATTENDING COMMENTS
would hold ACE-I and aldactone given BRYCE and hypotension would hold ACE-I and aldactone given BRYCE and hypotension. decrease Apixaban to 2.5mg BID given worsening GFR

## 2018-05-15 NOTE — PROGRESS NOTE ADULT - PROBLEM SELECTOR PLAN 1
-TTE strongly suggests amyloid cardiomyopathy.   -Volume status greatly improved. But JVP approximally 12, could be related elevated pressure from stiff atria/amyloid.  -Remains hypotensive this morning: Discontinued torsemide, Toprol and nahid. Decreased lisinopril to 2.5 mg po qd (hold if SBP <90).  -Please consider palliative consult for goals of care discussion with wife.

## 2018-05-15 NOTE — PROGRESS NOTE ADULT - ATTENDING COMMENTS
Hold diuretics, BB, ACE-I.  Monitor renal profile.  Consider palliative care evaluation.  F/u transthyretin assay result.

## 2018-05-15 NOTE — PROGRESS NOTE ADULT - ATTENDING COMMENTS
Dispo- Pending; PT rec MICHEAL but wife and patient want DC home with eldercare when medically stable. Await further CHF/renal recs

## 2018-05-15 NOTE — PROGRESS NOTE ADULT - SUBJECTIVE AND OBJECTIVE BOX
Patient seen and examined. He answers yes or no to questions. No further conversation. He denies SOB and CP.   Hypotensive last night and this morning. Tele V paced, and A paced on demand.     Medications:  amiodarone    Tablet 200 milliGRAM(s) Oral daily  apixaban 5 milliGRAM(s) Oral every 12 hours  atorvastatin 20 milliGRAM(s) Oral at bedtime  brimonidine 0.2% Ophthalmic Solution 1 Drop(s) Both EYES two times a day  dorzolamide 2% Ophthalmic Solution      dorzolamide 2% Ophthalmic Solution 1 Drop(s) Both EYES three times a day  famotidine    Tablet 20 milliGRAM(s) Oral daily  latanoprost 0.005% Ophthalmic Solution 1 Drop(s) Both EYES at bedtime  lisinopril 2.5 milliGRAM(s) Oral daily  mirtazapine 45 milliGRAM(s) Oral at bedtime  sodium chloride 0.9% lock flush 3 milliLiter(s) IV Push every 8 hours  spironolactone 25 milliGRAM(s) Oral daily      Vitals:  Vital Signs Last 24 Hrs  T(C): 36.2 (15 May 2018 06:09), Max: 36.2 (14 May 2018 21:23)  T(F): 97.2 (15 May 2018 06:09), Max: 97.2 (14 May 2018 21:23)  HR: 50 (15 May 2018 09:25) (50 - 53)  BP: 93/50 (15 May 2018 09:25) (70/42 - 98/60)  RR: 18 (15 May 2018 06:09) (18 - 18)  SpO2: 100% (15 May 2018 06:09) (97% - 100%)    Daily     Daily Weight in k (15 May 2018 07:17)    I&O's Detail    14 May 2018 07:01  -  15 May 2018 07:00  --------------------------------------------------------  IN:    Oral Fluid: 320 mL    Sodium Chloride 0.9% IV Bolus: 500 mL  Total IN: 820 mL    OUT:  Total OUT: 0 mL    Total NET: 820 mL      Physical Exam:     General: No distress. Comfortable.  HEENT: EOM intact.  Neck: Neck supple. External JV elevated. No masses  Chest: Clear to auscultation bilaterally  CV: Normal S1 and S2. No murmurs, rub, or gallops. Radial pulses normal.  No LE edema b/l.   Abdomen: Soft, non-distended, non-tender  Skin: No rashes or skin breakdown  Neurology: Alert and oriented times three. Sensation intact  Psych: Affect normal    Labs:                        11.2   10. )-----------( 260      ( 15 May 2018 06:19 )             34.2     05-15    141  |  100  |  49<H>  ----------------------------<  91  5.1   |  23  |  1.89<H>    Ca    9.0      15 May 2018 06:19  Mg     2.3     05-15    < from: Transthoracic Echocardiogram (18 @ 15:39) >  DIMENSIONS:  Dimensions:     Normal Values:  LA:     4.7 cm    2.0 - 4.0 cm  Ao:     2.5 cm    2.0 - 3.8 cm  SEPTUM: 1.5 cm    0.6 - 1.2 cm  PWT:    1.7 cm    0.6 - 1.1 cm  LVIDd:  3.7 cm    3.0 - 5.6 cm  LVIDs:  3.0 cm    1.8 - 4.0 cm  Derived Variables:  LVMI: 132 g/m2  RWT: 0.91  Fractional short: 19 %  Ejection Fraction (Teicholtz): 40 %  ------------------------------------------------------------------------  OBSERVATIONS:  Mitral Valve: Tethered mitral valve leaflets. Minimal  mitral regurgitation.  Aortic Root: Normal aortic root.  Aortic Valve: Aortic valve not well visualized.  Left Atrium: Severely dilated leftatrium.  LA volume index  = 60 cc/m2.  Left Ventricle: Moderate global left ventricular systolic  dysfunction. Severe concentric left ventricular  hypertrophy.  Right Heart: Moderate right atrial enlargement. Right  ventricular enlargement with decreased right ventricular  systolic function. A device wire is noted in the right  heart. Normal tricuspid valve. Mild tricuspid  regurgitation. Normal pulmonic valve.  Pericardium/PleuraNormal pericardium with no pericardial  effusion.    < end of copied text >

## 2018-05-16 ENCOUNTER — APPOINTMENT (OUTPATIENT)
Dept: ELECTROPHYSIOLOGY | Facility: CLINIC | Age: 73
End: 2018-05-16

## 2018-05-16 DIAGNOSIS — R06.02 SHORTNESS OF BREATH: ICD-10-CM

## 2018-05-16 DIAGNOSIS — R53.81 OTHER MALAISE: ICD-10-CM

## 2018-05-16 DIAGNOSIS — I50.9 HEART FAILURE, UNSPECIFIED: ICD-10-CM

## 2018-05-16 DIAGNOSIS — Z51.5 ENCOUNTER FOR PALLIATIVE CARE: ICD-10-CM

## 2018-05-16 LAB
APPEARANCE UR: SIGNIFICANT CHANGE UP
BILIRUB UR-MCNC: NEGATIVE — SIGNIFICANT CHANGE UP
BLOOD UR QL VISUAL: HIGH
BUN SERPL-MCNC: 58 MG/DL — HIGH (ref 7–23)
CALCIUM SERPL-MCNC: 8.9 MG/DL — SIGNIFICANT CHANGE UP (ref 8.4–10.5)
CHLORIDE SERPL-SCNC: 98 MMOL/L — SIGNIFICANT CHANGE UP (ref 98–107)
CHLORIDE UR-SCNC: 37 MMOL/L — SIGNIFICANT CHANGE UP
CO2 SERPL-SCNC: 27 MMOL/L — SIGNIFICANT CHANGE UP (ref 22–31)
COLOR SPEC: YELLOW — SIGNIFICANT CHANGE UP
CREAT ?TM UR-MCNC: 126.96 MG/DL — SIGNIFICANT CHANGE UP
CREAT SERPL-MCNC: 2.01 MG/DL — HIGH (ref 0.5–1.3)
GLUCOSE SERPL-MCNC: 82 MG/DL — SIGNIFICANT CHANGE UP (ref 70–99)
GLUCOSE UR-MCNC: NEGATIVE — SIGNIFICANT CHANGE UP
HCT VFR BLD CALC: 32.6 % — LOW (ref 39–50)
HGB BLD-MCNC: 10.5 G/DL — LOW (ref 13–17)
KETONES UR-MCNC: NEGATIVE — SIGNIFICANT CHANGE UP
LEUKOCYTE ESTERASE UR-ACNC: HIGH
MAGNESIUM SERPL-MCNC: 2.3 MG/DL — SIGNIFICANT CHANGE UP (ref 1.6–2.6)
MCHC RBC-ENTMCNC: 32.2 % — SIGNIFICANT CHANGE UP (ref 32–36)
MCHC RBC-ENTMCNC: 33 PG — SIGNIFICANT CHANGE UP (ref 27–34)
MCV RBC AUTO: 102.5 FL — HIGH (ref 80–100)
MUCOUS THREADS # UR AUTO: SIGNIFICANT CHANGE UP
NITRITE UR-MCNC: NEGATIVE — SIGNIFICANT CHANGE UP
NON-SQ EPI CELLS # UR AUTO: 1 — SIGNIFICANT CHANGE UP
NRBC # FLD: 0 — SIGNIFICANT CHANGE UP
PH UR: 8.5 — HIGH (ref 4.6–8)
PLATELET # BLD AUTO: 241 K/UL — SIGNIFICANT CHANGE UP (ref 150–400)
PMV BLD: 10.9 FL — SIGNIFICANT CHANGE UP (ref 7–13)
POTASSIUM SERPL-MCNC: 4.8 MMOL/L — SIGNIFICANT CHANGE UP (ref 3.5–5.3)
POTASSIUM SERPL-SCNC: 4.8 MMOL/L — SIGNIFICANT CHANGE UP (ref 3.5–5.3)
POTASSIUM UR-SCNC: > 100 MMOL/L — SIGNIFICANT CHANGE UP
PROT UR-MCNC: 116.4 MG/DL — SIGNIFICANT CHANGE UP
PROT UR-MCNC: 500 MG/DL — HIGH
RBC # BLD: 3.18 M/UL — LOW (ref 4.2–5.8)
RBC # FLD: 18 % — HIGH (ref 10.3–14.5)
RBC CASTS # UR COMP ASSIST: SIGNIFICANT CHANGE UP (ref 0–?)
SODIUM SERPL-SCNC: 140 MMOL/L — SIGNIFICANT CHANGE UP (ref 135–145)
SODIUM UR-SCNC: 47 MMOL/L — SIGNIFICANT CHANGE UP
SP GR SPEC: 1.02 — SIGNIFICANT CHANGE UP (ref 1–1.04)
SQUAMOUS # UR AUTO: SIGNIFICANT CHANGE UP
UROBILINOGEN FLD QL: >8 MG/DL — SIGNIFICANT CHANGE UP
WBC # BLD: 7.38 K/UL — SIGNIFICANT CHANGE UP (ref 3.8–10.5)
WBC # FLD AUTO: 7.38 K/UL — SIGNIFICANT CHANGE UP (ref 3.8–10.5)
WBC UR QL: SIGNIFICANT CHANGE UP (ref 0–?)

## 2018-05-16 PROCEDURE — 99232 SBSQ HOSP IP/OBS MODERATE 35: CPT

## 2018-05-16 PROCEDURE — 84165 PROTEIN E-PHORESIS SERUM: CPT | Mod: 26

## 2018-05-16 PROCEDURE — 99223 1ST HOSP IP/OBS HIGH 75: CPT

## 2018-05-16 PROCEDURE — 99233 SBSQ HOSP IP/OBS HIGH 50: CPT | Mod: GC

## 2018-05-16 PROCEDURE — 99233 SBSQ HOSP IP/OBS HIGH 50: CPT

## 2018-05-16 RX ORDER — SODIUM CHLORIDE 9 MG/ML
250 INJECTION INTRAMUSCULAR; INTRAVENOUS; SUBCUTANEOUS ONCE
Qty: 0 | Refills: 0 | Status: COMPLETED | OUTPATIENT
Start: 2018-05-16 | End: 2018-05-16

## 2018-05-16 RX ADMIN — ATORVASTATIN CALCIUM 20 MILLIGRAM(S): 80 TABLET, FILM COATED ORAL at 22:53

## 2018-05-16 RX ADMIN — LATANOPROST 1 DROP(S): 0.05 SOLUTION/ DROPS OPHTHALMIC; TOPICAL at 22:53

## 2018-05-16 RX ADMIN — AMIODARONE HYDROCHLORIDE 200 MILLIGRAM(S): 400 TABLET ORAL at 05:12

## 2018-05-16 RX ADMIN — MIRTAZAPINE 45 MILLIGRAM(S): 45 TABLET, ORALLY DISINTEGRATING ORAL at 22:53

## 2018-05-16 RX ADMIN — BRIMONIDINE TARTRATE 1 DROP(S): 2 SOLUTION/ DROPS OPHTHALMIC at 05:13

## 2018-05-16 RX ADMIN — DORZOLAMIDE HYDROCHLORIDE 1 DROP(S): 20 SOLUTION/ DROPS OPHTHALMIC at 22:53

## 2018-05-16 RX ADMIN — BRIMONIDINE TARTRATE 1 DROP(S): 2 SOLUTION/ DROPS OPHTHALMIC at 18:18

## 2018-05-16 RX ADMIN — DORZOLAMIDE HYDROCHLORIDE 1 DROP(S): 20 SOLUTION/ DROPS OPHTHALMIC at 05:12

## 2018-05-16 RX ADMIN — SODIUM CHLORIDE 3 MILLILITER(S): 9 INJECTION INTRAMUSCULAR; INTRAVENOUS; SUBCUTANEOUS at 15:09

## 2018-05-16 RX ADMIN — APIXABAN 2.5 MILLIGRAM(S): 2.5 TABLET, FILM COATED ORAL at 18:18

## 2018-05-16 RX ADMIN — DORZOLAMIDE HYDROCHLORIDE 1 DROP(S): 20 SOLUTION/ DROPS OPHTHALMIC at 13:38

## 2018-05-16 RX ADMIN — APIXABAN 2.5 MILLIGRAM(S): 2.5 TABLET, FILM COATED ORAL at 05:12

## 2018-05-16 RX ADMIN — SODIUM CHLORIDE 3 MILLILITER(S): 9 INJECTION INTRAMUSCULAR; INTRAVENOUS; SUBCUTANEOUS at 00:49

## 2018-05-16 RX ADMIN — SODIUM CHLORIDE 50 MILLILITER(S): 9 INJECTION INTRAMUSCULAR; INTRAVENOUS; SUBCUTANEOUS at 12:38

## 2018-05-16 RX ADMIN — SODIUM CHLORIDE 3 MILLILITER(S): 9 INJECTION INTRAMUSCULAR; INTRAVENOUS; SUBCUTANEOUS at 05:12

## 2018-05-16 RX ADMIN — FAMOTIDINE 20 MILLIGRAM(S): 10 INJECTION INTRAVENOUS at 12:39

## 2018-05-16 NOTE — PROGRESS NOTE ADULT - PROBLEM SELECTOR PLAN 1
Suspected due to IV contrast + diuresis + hypotension.  Pending repeat U/A with ulytes and protein / creatinine.  Lasix, ACE, aldactone all on hold.  Creatinine 2.0 today from 1.9 yesterday, likely plateaued.  Montor urine output, creatinine trend.  No acute HD indications at this time.  Pending bedside bladder scan. Suspected due to IV contrast + diuresis + hypotension.  Pending repeat U/A with ulytes and protein / creatinine.  Lasix, ACE, aldactone all on hold.  Creatinine 2.0 today from 1.9 yesterday, likely plateaued.  Monitor urine output, creatinine trend.  No acute HD indications at this time.  Pending bedside bladder scan.

## 2018-05-16 NOTE — PROGRESS NOTE ADULT - ATTENDING COMMENTS
Dispo- Poor prognosis discussed with patient's wife (Robyn Salomon) who may be agreeable to institution of home hospice on discharge with continuation of HHA. Patient likely has a survival of < 6 months based on current medical state and severe incurable CHF. He would be a good candidate for home hospice. Discharge pending.

## 2018-05-16 NOTE — PROGRESS NOTE ADULT - SUBJECTIVE AND OBJECTIVE BOX
[facility] DIVISION OF KIDNEY DISEASES AND HYPERTENSION -- FOLLOW UP NOTE  --------------------------------------------------------------------------------  Chief Complaint:  BRYCE    24 hour events/subjective:  Patient denies any complaints at this time, denies any shortness of breath.        PAST HISTORY  --------------------------------------------------------------------------------  No significant changes to PMH, PSH, FHx, SHx, unless otherwise noted    ALLERGIES & MEDICATIONS  --------------------------------------------------------------------------------  Allergies    No Known Allergies    Intolerances      Standing Inpatient Medications  amiodarone    Tablet 200 milliGRAM(s) Oral daily  apixaban 2.5 milliGRAM(s) Oral every 12 hours  atorvastatin 20 milliGRAM(s) Oral at bedtime  brimonidine 0.2% Ophthalmic Solution 1 Drop(s) Both EYES two times a day  dorzolamide 2% Ophthalmic Solution      dorzolamide 2% Ophthalmic Solution 1 Drop(s) Both EYES three times a day  famotidine    Tablet 20 milliGRAM(s) Oral daily  latanoprost 0.005% Ophthalmic Solution 1 Drop(s) Both EYES at bedtime  mirtazapine 45 milliGRAM(s) Oral at bedtime  sodium chloride 0.9% Bolus 250 milliLiter(s) IV Bolus once  sodium chloride 0.9% lock flush 3 milliLiter(s) IV Push every 8 hours    PRN Inpatient Medications      REVIEW OF SYSTEMS  --------------------------------------------------------------------------------  Gen: No fevers/chills  Skin: No rashes  Head/Eyes/Ears/Mouth: No headache  Respiratory: No dyspnea  CV: No chest pain  GI: No abdominal pain  : No dysuria  MSK: No edema  Neuro: No dizziness/lightheadedness    VITALS/PHYSICAL EXAM  --------------------------------------------------------------------------------  T(C): 36.2 (05-16-18 @ 05:12), Max: 36.8 (05-15-18 @ 20:21)  HR: 67 (05-16-18 @ 05:12) (50 - 67)  BP: 108/65 (05-16-18 @ 05:12) (93/58 - 108/65)  RR: 16 (05-16-18 @ 05:12) (16 - 18)  SpO2: 100% (05-16-18 @ 05:12) (100% - 100%)  Wt(kg): --        Physical Exam:  	Gen: NAD, well-appearing  	HEENT: MMM  	Pulm: CTA B/L  	CV: RRR, S1S2; no rub  	Abd: +BS, soft, nontender/nondistended  	: No suprapubic tenderness  	UE:  no edema  	LE:  no pitting edema  	Neuro: speech difficulty  	Psych: Normal affect and mood  	Skin: Warm    LABS/STUDIES  --------------------------------------------------------------------------------              10.5   7.38  >-----------<  241      [05-16-18 @ 06:30]              32.6     140  |  98  |  58  ----------------------------<  82      [05-16-18 @ 06:30]  4.8   |  27  |  2.01        Ca     8.9     [05-16-18 @ 06:30]      Mg     2.3     [05-16-18 @ 06:30]    Creatinine Trend:  SCr 2.01 [05-16 @ 06:30]  SCr 1.89 [05-15 @ 06:19]  SCr 1.43 [05-14 @ 06:34]  SCr 1.54 [05-13 @ 06:52]  SCr 1.60 [05-12 @ 06:14]

## 2018-05-16 NOTE — PROGRESS NOTE ADULT - SUBJECTIVE AND OBJECTIVE BOX
Patient is a 73y old  Male who presents with a chief complaint of Swelling (08 May 2018 20:22)    SUBJECTIVE / OVERNIGHT EVENTS: Patient seen and examined. No acute overnight events. BP better but still low. No current complaints. Denies CP, denies SOB. Denies n/v or abdominal pain.     Review of Systems: As per above     MEDICATIONS  (STANDING):  amiodarone    Tablet 200 milliGRAM(s) Oral daily  apixaban 2.5 milliGRAM(s) Oral every 12 hours  atorvastatin 20 milliGRAM(s) Oral at bedtime  brimonidine 0.2% Ophthalmic Solution 1 Drop(s) Both EYES two times a day  dorzolamide 2% Ophthalmic Solution      dorzolamide 2% Ophthalmic Solution 1 Drop(s) Both EYES three times a day  famotidine    Tablet 20 milliGRAM(s) Oral daily  latanoprost 0.005% Ophthalmic Solution 1 Drop(s) Both EYES at bedtime  mirtazapine 45 milliGRAM(s) Oral at bedtime  sodium chloride 0.9% lock flush 3 milliLiter(s) IV Push every 8 hours    PHYSICAL EXAM:  Vital Signs Last 24 Hrs  T(C): 36.9 (16 May 2018 13:04), Max: 36.9 (16 May 2018 13:04)  T(F): 98.4 (16 May 2018 13:04), Max: 98.4 (16 May 2018 13:04)  HR: 56 (16 May 2018 13:04) (50 - 67)  BP: 104/67 (16 May 2018 13:04) (98/50 - 108/65)  BP(mean): --  RR: 18 (16 May 2018 13:04) (16 - 18)  SpO2: 98% (16 May 2018 13:04) (98% - 100%)      PHYSICAL EXAM  GENERAL: older man, responding to questions with one-word answers, NAD  HEAD:  Atraumatic, Normocephalic  EYES: EOMI, conjunctiva and sclera clear  NECK: Supple, JVP mild  CHEST/LUNG: Clear to auscultation bilaterally; No wheeze  HEART: Regular rate and rhythm; No murmurs, rubs, or gallops  ABDOMEN: Soft, Nontender, Nondistended; Bowel sounds present  EXTREMITIES:  2+ Peripheral Pulses, No clubbing, cyanosis.   NEURO: Expressive aphasia   : [place] cath draining yellow urine     LABS:                        10.5   7.38  )-----------( 241      ( 16 May 2018 06:30 )             32.6   05-16    140  |  98  |  58<H>  ----------------------------<  82  4.8   |  27  |  2.01<H>    Ca    8.9      16 May 2018 06:30  Mg     2.3     05-16    RADIOLOGY & ADDITIONAL TESTS:    Imaging Personally Reviewed:    Consultant(s) Notes Reviewed:  CHF    Care Discussed with Consultants/Other Providers: CHF team/tele PA/palliative care

## 2018-05-16 NOTE — CONSULT NOTE ADULT - ASSESSMENT
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 temporal arteritis who presented on 5/8 referred by primary care for b/l LE edema, ascites in setting of apparent dietary indiscretion. On exam, elevated JVD, NAD, RRR, no m/r/g, lungs clear, abdom distended, 2+ LE b/l. Labs reviewed - BUN/Cr 23/1.13 (from 1.56 3/18), Hb 12.1, Trop T 0.2. TTE 8/17 reviewed - severe concentric LVH (septum 1.7 cm, PW 1.7 cm), LVEDD 3.3 cm, EF 15%, biatrial enlargement, RV dysfunction, moderate MR/TR. 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  - continue home meds with exception of imdur 60 mg daily  - increase lasix 40 mg IV every 8 hours  - keep K>4, Mg>2  - continue Eliquis; make 5 mg bid   - repeat TTE  - would do cardiac amyloid workup; please send serum free light chains, serum and urine immunofixation; will consider pyrophosphate study to evaluate for TTR
Patient is a 74 y/o man with CHF with reduced EF, CVA w/ residual right weakness, afib on a/c, prostate ca, HTN who presented with acute CHF exacerbation and developed BRYCE inpatient.
73 year old man with CHF, BRYCE, SOB, debility and encounter for palliative care.

## 2018-05-16 NOTE — PROGRESS NOTE ADULT - PROBLEM SELECTOR PLAN 1
-TTE strongly suggests amyloid cardiomyopathy.   -BRYCE and borderline hypotensive. He is not eating and drinking well. Observed pt drink about 200 ml of fluid and will give pt another 250 ml over 5 hrs.  -Holding all GDMT for HF as he is hypotensive w/ BRYCE currently. Will try to re-introduce meds over next few days.  -Please consider palliative consult for goals of care discussion with wife.  -Follow up transthyretin labs.

## 2018-05-16 NOTE — CONSULT NOTE ADULT - PROBLEM SELECTOR RECOMMENDATION 9
Suspected due to IV contrast + diuresis + hypotension overnight.  K/L ratio noted, unremarkable in setting of rising creatinine.  Pending immunofixation.  Can check Spep as well.  Urinalysis during admission consistent with clearing contrast (SG 1.040).  Will repeat U/A with ulytes and protein / creatinine.  Agree with holding lasix for today, but will likely require chronic loop diuretic based on history and being on ACE + aldactone.  Montor urine output, creatinine trend.  No acute HD indications at this time.  Will check bladder scan.
Patient with CHF with EF of 14%. Patient presented initially with CHF exacerbation, was diuresed. Patient would be hospice appropriate for home.

## 2018-05-16 NOTE — PROGRESS NOTE ADULT - SUBJECTIVE AND OBJECTIVE BOX
Patient seen and examined. He is feeling ok. Told me that he is thirsty. Drank about 200 ml of water. No CP, palpitations, SOB.     Medications:  amiodarone    Tablet 200 milliGRAM(s) Oral daily  apixaban 2.5 milliGRAM(s) Oral every 12 hours  atorvastatin 20 milliGRAM(s) Oral at bedtime  brimonidine 0.2% Ophthalmic Solution 1 Drop(s) Both EYES two times a day  dorzolamide 2% Ophthalmic Solution      dorzolamide 2% Ophthalmic Solution 1 Drop(s) Both EYES three times a day  famotidine    Tablet 20 milliGRAM(s) Oral daily  latanoprost 0.005% Ophthalmic Solution 1 Drop(s) Both EYES at bedtime  mirtazapine 45 milliGRAM(s) Oral at bedtime  sodium chloride 0.9% Bolus 250 milliLiter(s) IV Bolus once  sodium chloride 0.9% lock flush 3 milliLiter(s) IV Push every 8 hours      Vitals:  Vital Signs Last 24 Hrs  T(C): 36.2 (16 May 2018 05:12), Max: 36.8 (15 May 2018 20:21)  T(F): 97.2 (16 May 2018 05:12), Max: 98.2 (15 May 2018 20:21)  HR: 67 (16 May 2018 05:12) (50 - 67)  BP: 108/65 (16 May 2018 05:12) (93/58 - 108/65)  RR: 16 (16 May 2018 05:12) (16 - 18)  SpO2: 100% (16 May 2018 05:12) (100% - 100%)    Daily     Daily Weight in k (16 May 2018 07:41)    I&O's Detail      Physical Exam:     General: No distress. Comfortable.  HEENT: EOM intact.  Neck: Neck supple. External JV distended. No masses  Chest: Clear to auscultation bilaterally  CV: Normal S1 and S2. No murmurs, rub, or gallops. Radial pulses normal. No LE edema b/l.   Abdomen: Soft, non-distended, non-tender  Skin: No rashes or skin breakdown  Neurology: Alert and oriented times three. Sensation intact  Psych: Affect normal    Labs:                        10.5   7.38  )-----------( 241      ( 16 May 2018 06:30 )             32.6     05-16    140  |  98  |  58<H>  ----------------------------<  82  4.8   |  27  |  2.01<H>    Ca    8.9      16 May 2018 06:30  Mg     2.3     -16    < from: Transthoracic Echocardiogram (18 @ 15:39) >  DIMENSIONS:  Dimensions:     Normal Values:  LA:     4.7 cm    2.0 - 4.0 cm  Ao:     2.5 cm    2.0 - 3.8 cm  SEPTUM: 1.5 cm    0.6 - 1.2 cm  PWT:    1.7 cm    0.6 - 1.1 cm  LVIDd:  3.7 cm    3.0 - 5.6 cm  LVIDs:  3.0 cm    1.8 - 4.0 cm  Derived Variables:  LVMI: 132 g/m2  RWT: 0.91  Fractional short: 19 %  Ejection Fraction (Teicholtz): 40 %  ------------------------------------------------------------------------  OBSERVATIONS:  Mitral Valve: Tethered mitral valve leaflets. Minimal  mitral regurgitation.  Aortic Root: Normal aortic root.  Aortic Valve: Aortic valve not well visualized.  Left Atrium: Severely dilated leftatrium.  LA volume index  = 60 cc/m2.  Left Ventricle: Moderate global left ventricular systolic  dysfunction. Severe concentric left ventricular  hypertrophy.  Right Heart: Moderate right atrial enlargement. Right  ventricular enlargement with decreased right ventricular  systolic function. A device wire is noted in the right  heart. Normal tricuspid valve. Mild tricuspid  regurgitation. Normal pulmonic valve.  Pericardium/PleuraNormal pericardium with no pericardial  effusion.    < end of copied text >

## 2018-05-16 NOTE — PROGRESS NOTE ADULT - PROBLEM SELECTOR PLAN 2
Baseline SCr appears to be ~1.1; likely due to ATN from volume contraction and hypotension   - SCr still rising but likely plateaued today    - Renal recs appreciated; will hold all anti-HTN's including diuretics and ACE INH at this time   - Monitor BMP  - Keep MAP > 65 if possible

## 2018-05-16 NOTE — PROGRESS NOTE ADULT - PROBLEM SELECTOR PLAN 1
- S/p TTE 5/11 personally reviewed and showed EF 40% with decreased RV function; likely due to amyloidosis per cardio (appreciated)   - Strict I/Os. Daily weights (unable to do standing given R sided hemiparesis) .   - CHF recs appreciated; will hold all diuretics, ACE INH, and and toprol for now given hypotension and BRYCE  - Low salt diet  - Monitor BPs closely  - Poor prognosis given poor functional status and severe systolic CHF suspected to be due to amyloidosis  - Palliative care consulted; appreciate recs. Patient's wife on board with discharge home with hospice once medically cleared   - Hospice network referral made

## 2018-05-16 NOTE — CONSULT NOTE ADULT - PROBLEM SELECTOR RECOMMENDATION 3
No active shortness of breath, if shortness of breath presents would consider low dose Dilaudid 0.2mg q3h PRN.

## 2018-05-16 NOTE — CONSULT NOTE ADULT - PROBLEM SELECTOR RECOMMENDATION 5
Spoke to patients wife over the phone. She understands overall poor prognosis and would like to be able to take her  home. Hospice was explained to her and a referral was made.

## 2018-05-16 NOTE — GOALS OF CARE CONVERSATION - PERSONAL ADVANCE DIRECTIVE - CONVERSATION DETAILS
[facility]:    Evaluation for hospice care done. Spoke with pt's spouse, Robyn Salomon ([phone]) via tel call. Hospice care, services, eligibility and need for signed consent explained. Mrs Salomon expressed good understanding and requested hospice folder with consent forms be left for her at pt's bedside. Folder left. Ms Salomon stated she will review consents. [facility] RN will follow up with a phone call to her in the morning 5/17/18. Pt has [employer] (Medicaid) and has [facility]HA service [id number]/week. He has a hospital bed, w/c and walker at home. [facility] RN will order o2 (from [facility]) once signed consents are received. [facility] RN remains available.

## 2018-05-16 NOTE — CONSULT NOTE ADULT - SUBJECTIVE AND OBJECTIVE BOX
HPI:  History provided by the pt's emergency contact, son Jon Salomon , who is currently not present at the bedside and the chart.   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 The pt was advised to go the ED due to anasarca and a concern for CHF exacerbation and urinary retention.  Pt is aphasic at baseline and has difficulty communicating. Per the son, the pt does not monitor his salt intake, has been complaint with all of his medications, Positive orthopnea, Dyspnea on minimal exertion, b/l LE swelling, atraumatic fall 1 week ago and did not seek medical attention. No chest pain, palpitations, nausea, vomit, chills, diaphoresis, diarrhea, constipation, dysuria, hematuria.  In the Ed, the pt is found to be fluid over loaded and was give Lasix 40 mg IV x 1 with positive urine output. (08 May 2018 20:22)      PERTINENT PMH REVIEWED:  [ ] YES [ ] NO           SOCIAL HISTORY:  Significant other/partner:  [ ] YES  [ ] NO            Children:  [ ] YES  [ ] NO                   Scientology/Spirituality:  Substance hx:  [ ] YES   [ ] NO           Tobacco hx:  [ ] YES  [ ] NO             Alcohol hx: [ ] YES  [ ] NO        Home Opioid hx:  [ ] YES  [ ] NO   Living Situation: [ ] Home  [ ] Long term care  [ ] Rehab    FAMILY HISTORY:  Family history of cancer (Sibling)    [ ] Family history non contributory     BASELINE ADLs (prior to admission):  Independent [ ] moderately [ ] fully   Dependent   [ ] moderately [ ] fully    Code Status:                      MOLST  [ ] YES [ ] NO    Living Will  [ ] YES [ ] NO    Health Care Proxy [ ] YES  [ ] NO      [ ] Surrogate  [ ] HCP  [ ] Guardian:                                                                  Phone#:    Allergies    No Known Allergies    Intolerances        MEDICATIONS  (STANDING):  amiodarone    Tablet 200 milliGRAM(s) Oral daily  apixaban 2.5 milliGRAM(s) Oral every 12 hours  atorvastatin 20 milliGRAM(s) Oral at bedtime  brimonidine 0.2% Ophthalmic Solution 1 Drop(s) Both EYES two times a day  dorzolamide 2% Ophthalmic Solution      dorzolamide 2% Ophthalmic Solution 1 Drop(s) Both EYES three times a day  famotidine    Tablet 20 milliGRAM(s) Oral daily  latanoprost 0.005% Ophthalmic Solution 1 Drop(s) Both EYES at bedtime  mirtazapine 45 milliGRAM(s) Oral at bedtime  sodium chloride 0.9% Bolus 250 milliLiter(s) IV Bolus once  sodium chloride 0.9% lock flush 3 milliLiter(s) IV Push every 8 hours    MEDICATIONS  (PRN):      PRESENT SYMPTOMS:  Source: [ ] Patient   [ ] Family   [ ] Team     Pain: [ ] YES [ ] NO  Onset:                    Location:                          Duration:                 Character:            Aggravating factors:                        Relieving factors:    Radiation:              Timing:                             Severity:      Dyspnea: [ ] YES [ ] NO - Mild [ ]  Moderate [ ]  Severe [ ]    Anxiety: [ ] YES [ ] NO  Fatigue: [ ] YES [ ] NO   Nausea: [ ] YES [ ] NO  Loss of appetite: [ ] YES [ ] NO   Constipation: [ ] YES [ ] NO     Other Symptoms:  [ ] All other review of systems negative   [ ] Unable to obtain due to poor mentation     Does patient meet criteria for Severe Protein Calorie Malnutrition?  Yes [ ]  No [ ]  PPSV 30% or below [ ]  Anasarca [ ]  Albumin < 2 [ ] Catabolic State [ ] Poor nutritional intake [ ] Significant weight loss [ ]      Palliative Performance Status Version 2:         %  ECOG -        Vital Signs Last 24 Hrs  T(C): 36.2 (16 May 2018 05:12), Max: 36.8 (15 May 2018 20:21)  T(F): 97.2 (16 May 2018 05:12), Max: 98.2 (15 May 2018 20:21)  HR: 67 (16 May 2018 05:12) (50 - 67)  BP: 108/65 (16 May 2018 05:12) (93/58 - 108/65)  BP(mean): --  RR: 16 (16 May 2018 05:12) (16 - 18)  SpO2: 100% (16 May 2018 05:12) (100% - 100%)    Physical Exam:    General: [ ] Alert,  A&O x     [ ] lethargic   [ ] Agitated   [ ] Cachexia   HEENT: [ ] Normal   [ ] Dry mouth   [ ] ET Tube    [ ] Trach   Lungs: [ ] Clear [ ] Rhonchi  [ ] Crackles [ ] Wheezing [ ] Tachypnea  [ ] Audible excessive secretions    Cardiovascular:  [ ] Regular rate and rhythm  [ ] Irregular [ ] Tachycardia   [ ] Bradycardia   Abdomen: [ ] Soft  [ ] Distended  [ ] +BS  [ ] Non tender [ ] Tender  [ ]PEG   [ ] NGT   Last BM:     Genitourinary: [ ] Normal [ ] Incontinent   [ ] Oliguria/Anuria   [ ] [place]  Musculoskeletal:  [ ] Normal   [ ] Generalized weakness  [ ] Bedbound  [ ] Edema   Neurological: [ ] No focal deficits  [ ] Cognitive impairment     Skin: [ ] Normal   [ ] Pressure ulcers     LABS:                        10.5   7.38  )-----------( 241      ( 16 May 2018 06:30 )             32.6     05-16    140  |  98  |  58<H>  ----------------------------<  82  4.8   |  27  |  2.01<H>    Ca    8.9      16 May 2018 06:30  Mg     2.3     05-16          I&O's Summary      RADIOLOGY & ADDITIONAL STUDIES:    Referrals:  Hospice [ ]   Chaplaincy [ ]    Child Life [ ]   Social Work [ ]   Case Management [ ]   Holistic Therapy [ ] HPI:  As per chart review, patient is a 73 year old man that ambulates with a cane, lately wheelchair due to 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 The pt was advised to go the ED due to anasarca and a concern for CHF exacerbation and urinary retention.  Pt is aphasic at baseline and has difficulty communicating. The patient does not monitor his salt intake, has been complaint with all of his medications, Positive orthopnea, Dyspnea on minimal exertion, b/l LE swelling, atraumatic fall 1 week ago and did not seek medical attention. In the Ed, the pt is found to be fluid over loaded and was give Lasix 40 mg IV x 1 with positive urine output. Patient examined while resting in bed in no acute distress. Patient       PERTINENT PMH REVIEWED:  [x ] YES [ ] NO           SOCIAL HISTORY:  Significant other/partner:  [ x] YES  [ ] NO            Children:  [ ] YES  [ x] NO                   Adventism/Spirituality:  Substance hx:  [ ] YES   [ x] NO           Tobacco hx:  [ ] YES  [x ] NO             Alcohol hx: [ ] YES  [ x] NO        Home Opioid hx:  [ ] YES  [x ] NO   Living Situation: [x ] Home  [ ] Long term care  [ ] Rehab    FAMILY HISTORY:  Family history of cancer (Sibling)    [ ] Family history non contributory     BASELINE ADLs (prior to admission):  Independent [ ] moderately [ ] fully   Dependent   [ x] moderately [ ] fully    Code Status:   Full code                   MOLST  [ ] YES [ x] NO    Living Will  [ ] YES [x ] NO    Health Care Proxy [ ] YES  [x] NO      [x ] Surrogate  [ ] HCP  [ ] Guardian: Robyn Aime                Phone#: [phone]    Allergies    No Known Allergies    Intolerances    MEDICATIONS  (STANDING):  amiodarone    Tablet 200 milliGRAM(s) Oral daily  apixaban 2.5 milliGRAM(s) Oral every 12 hours  atorvastatin 20 milliGRAM(s) Oral at bedtime  brimonidine 0.2% Ophthalmic Solution 1 Drop(s) Both EYES two times a day  dorzolamide 2% Ophthalmic Solution      dorzolamide 2% Ophthalmic Solution 1 Drop(s) Both EYES three times a day  famotidine    Tablet 20 milliGRAM(s) Oral daily  latanoprost 0.005% Ophthalmic Solution 1 Drop(s) Both EYES at bedtime  mirtazapine 45 milliGRAM(s) Oral at bedtime  sodium chloride 0.9% Bolus 250 milliLiter(s) IV Bolus once  sodium chloride 0.9% lock flush 3 milliLiter(s) IV Push every 8 hours    MEDICATIONS  (PRN):      PRESENT SYMPTOMS:  Source: [x ] Patient   [ ] Family   [ ] Team     Pain: [ ] YES [ x] NO  Onset:                    Location:                          Duration:                 Character:            Aggravating factors:                        Relieving factors:    Radiation:              Timing:                             Severity:      Dyspnea: [ ] YES [x ] NO - Mild [ ]  Moderate [ ]  Severe [ ]    Anxiety: [ ] YES [ x] NO  Fatigue: [ ] YES [ x] NO   Nausea: [ ] YES [x ] NO  Loss of appetite: [ ] YES [x ] NO   Constipation: [ ] YES [x ] NO     Other Symptoms:  [ ] All other review of systems negative   [ ] Unable to obtain due to poor mentation     Does patient meet criteria for Severe Protein Calorie Malnutrition?  Yes [ ]  No [ ]  PPSV 30% or below [ ]  Anasarca [ ]  Albumin < 2 [ ] Catabolic State [ ] Poor nutritional intake [ ] Significant weight loss [ ]      Palliative Performance Status Version 2:         50%    Vital Signs Last 24 Hrs  T(C): 36.2 (16 May 2018 05:12), Max: 36.8 (15 May 2018 20:21)  T(F): 97.2 (16 May 2018 05:12), Max: 98.2 (15 May 2018 20:21)  HR: 67 (16 May 2018 05:12) (50 - 67)  BP: 108/65 (16 May 2018 05:12) (93/58 - 108/65)  BP(mean): --  RR: 16 (16 May 2018 05:12) (16 - 18)  SpO2: 100% (16 May 2018 05:12) (100% - 100%)    Physical Exam:    General: [x ] Alert,  A&O x 3    [ ] lethargic   [ ] Agitated   [ ] Cachexia   HEENT: [ ] Normal   [x ] Dry mouth   [ ] ET Tube    [ ] Trach   Lungs: [x ] Clear [ ] Rhonchi  [ ] Crackles [ ] Wheezing [ ] Tachypnea  [ ] Audible excessive secretions    Cardiovascular:  [ x] Regular rate and rhythm  [ ] Irregular [ ] Tachycardia   [ ] Bradycardia   Abdomen: [x ] Soft  [ ] Distended  [ ] +BS  [x ] Non tender [ ] Tender  [ ]PEG   [ ] NGT   Last BM:     Genitourinary: [ ] Normal [ ] Incontinent   [ ] Oliguria/Anuria   [ ] [place]  Musculoskeletal:  [ ] Normal   [ ] Generalized weakness  [ ] Bedbound  [ ] Edema   Neurological: [ ] No focal deficits  [ ] Cognitive impairment   [x] right hemiplegia  Skin: [x] Normal   [ ] Pressure ulcers     LABS:                        10.5   7.38  )-----------( 241      ( 16 May 2018 06:30 )             32.6     05-16    140  |  98  |  58<H>  ----------------------------<  82  4.8   |  27  |  2.01<H>    Ca    8.9      16 May 2018 06:30  Mg     2.3     05-16      I&O's Summary    RADIOLOGY & ADDITIONAL STUDIES:    EXAM:  CT ABDOMEN AND PELVIS IC    PROCEDURE DATE:  May  8 2018     IMPRESSION:    Passive congestion of the liver from cardiomyopathy.   Evidence of third spacing of fluid.    Agree with the preliminary report submitted at the time of the exam.    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 The pt was advised to go the ED due to anasarca and a concern for CHF exacerbation and urinary retention.  Pt is aphasic at baseline and has difficulty communicating. The patient does not monitor his salt intake, has been complaint with all of his medications, Positive orthopnea, Dyspnea on minimal exertion, b/l LE swelling, atraumatic fall 1 week ago and did not seek medical attention. In the Ed, the pt is found to be fluid over loaded and was give Lasix 40 mg IV x 1 with positive urine output. Patient examined while resting in bed in no acute distress. Patient       PERTINENT PMH REVIEWED:  [x ] YES [ ] NO           SOCIAL HISTORY:  Significant other/partner:  [ x] YES  [ ] NO            Children:  [ ] YES  [ x] NO                   Scientology/Spirituality:  Substance hx:  [ ] YES   [ x] NO           Tobacco hx:  [ ] YES  [x ] NO             Alcohol hx: [ ] YES  [ x] NO        Home Opioid hx:  [ ] YES  [x ] NO   Living Situation: [x ] Home  [ ] Long term care  [ ] Rehab    FAMILY HISTORY:  Family history of cancer (Sibling)    [ ] Family history non contributory     BASELINE ADLs (prior to admission):  Independent [ ] moderately [ ] fully   Dependent   [ x] moderately [ ] fully    Code Status:   Full code                   MOLST  [ ] YES [ x] NO    Living Will  [ ] YES [x ] NO    Health Care Proxy [ ] YES  [x] NO      [x ] Surrogate  [ ] HCP  [ ] Guardian: Robyn Aime                Phone#: [phone]    Allergies    No Known Allergies    Intolerances    MEDICATIONS  (STANDING):  amiodarone    Tablet 200 milliGRAM(s) Oral daily  apixaban 2.5 milliGRAM(s) Oral every 12 hours  atorvastatin 20 milliGRAM(s) Oral at bedtime  brimonidine 0.2% Ophthalmic Solution 1 Drop(s) Both EYES two times a day  dorzolamide 2% Ophthalmic Solution      dorzolamide 2% Ophthalmic Solution 1 Drop(s) Both EYES three times a day  famotidine    Tablet 20 milliGRAM(s) Oral daily  latanoprost 0.005% Ophthalmic Solution 1 Drop(s) Both EYES at bedtime  mirtazapine 45 milliGRAM(s) Oral at bedtime  sodium chloride 0.9% Bolus 250 milliLiter(s) IV Bolus once  sodium chloride 0.9% lock flush 3 milliLiter(s) IV Push every 8 hours    MEDICATIONS  (PRN):      PRESENT SYMPTOMS:  Source: [x ] Patient   [ ] Family   [ ] Team     Pain: [ ] YES [ x] NO  Onset:                    Location:                          Duration:                 Character:            Aggravating factors:                        Relieving factors:    Radiation:              Timing:                             Severity:      Dyspnea: [ ] YES [x ] NO - Mild [ ]  Moderate [ ]  Severe [ ]    Anxiety: [ ] YES [ x] NO  Fatigue: [ ] YES [ x] NO   Nausea: [ ] YES [x ] NO  Loss of appetite: [ ] YES [x ] NO   Constipation: [ ] YES [x ] NO     Other Symptoms:  [ ] All other review of systems negative   [ ] Unable to obtain due to poor mentation     Does patient meet criteria for Severe Protein Calorie Malnutrition?  Yes [ ]  No [ ]  PPSV 30% or below [ ]  Anasarca [ ]  Albumin < 2 [ ] Catabolic State [ ] Poor nutritional intake [ ] Significant weight loss [ ]      Palliative Performance Status Version 2:         60%    Vital Signs Last 24 Hrs  T(C): 36.2 (16 May 2018 05:12), Max: 36.8 (15 May 2018 20:21)  T(F): 97.2 (16 May 2018 05:12), Max: 98.2 (15 May 2018 20:21)  HR: 67 (16 May 2018 05:12) (50 - 67)  BP: 108/65 (16 May 2018 05:12) (93/58 - 108/65)  BP(mean): --  RR: 16 (16 May 2018 05:12) (16 - 18)  SpO2: 100% (16 May 2018 05:12) (100% - 100%)    Physical Exam:    General: [x ] Alert,  A&O x 3    [ ] lethargic   [ ] Agitated   [ ] Cachexia   HEENT: [ ] Normal   [x ] Dry mouth   [ ] ET Tube    [ ] Trach   Lungs: [x ] Clear [ ] Rhonchi  [ ] Crackles [ ] Wheezing [ ] Tachypnea  [ ] Audible excessive secretions    Cardiovascular:  [ x] Regular rate and rhythm  [ ] Irregular [ ] Tachycardia   [ ] Bradycardia   Abdomen: [x ] Soft  [ ] Distended  [ ] +BS  [x ] Non tender [ ] Tender  [ ]PEG   [ ] NGT   Last BM:     Genitourinary: [ ] Normal [ ] Incontinent   [ ] Oliguria/Anuria   [ ] [place]  Musculoskeletal:  [ ] Normal   [ ] Generalized weakness  [ ] Bedbound  [ ] Edema   Neurological: [ ] No focal deficits  [ ] Cognitive impairment   [x] right hemiplegia  Skin: [x] Normal   [ ] Pressure ulcers     LABS:                        10.5   7.38  )-----------( 241      ( 16 May 2018 06:30 )             32.6     05-16    140  |  98  |  58<H>  ----------------------------<  82  4.8   |  27  |  2.01<H>    Ca    8.9      16 May 2018 06:30  Mg     2.3     05-16      I&O's Summary    RADIOLOGY & ADDITIONAL STUDIES:    EXAM:  CT ABDOMEN AND PELVIS IC    PROCEDURE DATE:  May  8 2018     IMPRESSION:    Passive congestion of the liver from cardiomyopathy.   Evidence of third spacing of fluid.    Agree with the preliminary report submitted at the time of the exam.    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 The pt was advised to go the ED due to anasarca and a concern for CHF exacerbation and urinary retention.  Pt is aphasic at baseline and has difficulty communicating. The patient does not monitor his salt intake, has been complaint with all of his medications, Positive orthopnea, Dyspnea on minimal exertion, b/l LE swelling, atraumatic fall 1 week ago and did not seek medical attention. In the Ed, the pt is found to be fluid over loaded and was give Lasix 40 mg IV x 1 with positive urine output. Patient examined while resting in bed in no acute distress.     PERTINENT PMH REVIEWED:  [x ] YES [ ] NO           SOCIAL HISTORY:  Significant other/partner:  [ x] YES  [ ] NO            Children:  [ ] YES  [ x] NO                   Zoroastrian/Spirituality:  Substance hx:  [ ] YES   [ x] NO           Tobacco hx:  [ ] YES  [x ] NO             Alcohol hx: [ ] YES  [ x] NO        Home Opioid hx:  [ ] YES  [x ] NO   Living Situation: [x ] Home  [ ] Long term care  [ ] Rehab    FAMILY HISTORY:  Family history of cancer (Sibling)    [ ] Family history non contributory     BASELINE ADLs (prior to admission):  Independent [ ] moderately [ ] fully   Dependent   [ x] moderately [ ] fully    Code Status:   Full code                   MOLST  [ ] YES [ x] NO    Living Will  [ ] YES [x ] NO    Health Care Proxy [ ] YES  [x] NO      [x ] Surrogate  [ ] HCP  [ ] Guardian: Robyn Aime                Phone#: [phone]    Allergies    No Known Allergies    Intolerances    MEDICATIONS  (STANDING):  amiodarone    Tablet 200 milliGRAM(s) Oral daily  apixaban 2.5 milliGRAM(s) Oral every 12 hours  atorvastatin 20 milliGRAM(s) Oral at bedtime  brimonidine 0.2% Ophthalmic Solution 1 Drop(s) Both EYES two times a day  dorzolamide 2% Ophthalmic Solution      dorzolamide 2% Ophthalmic Solution 1 Drop(s) Both EYES three times a day  famotidine    Tablet 20 milliGRAM(s) Oral daily  latanoprost 0.005% Ophthalmic Solution 1 Drop(s) Both EYES at bedtime  mirtazapine 45 milliGRAM(s) Oral at bedtime  sodium chloride 0.9% Bolus 250 milliLiter(s) IV Bolus once  sodium chloride 0.9% lock flush 3 milliLiter(s) IV Push every 8 hours    MEDICATIONS  (PRN):      PRESENT SYMPTOMS:  Source: [x ] Patient   [ ] Family   [ ] Team     Pain: [ ] YES [ x] NO  Onset:                    Location:                          Duration:                 Character:            Aggravating factors:                        Relieving factors:    Radiation:              Timing:                             Severity:      Dyspnea: [ ] YES [x ] NO - Mild [ ]  Moderate [ ]  Severe [ ]    Anxiety: [ ] YES [ x] NO  Fatigue: [ ] YES [ x] NO   Nausea: [ ] YES [x ] NO  Loss of appetite: [ ] YES [x ] NO   Constipation: [ ] YES [x ] NO     Other Symptoms:  [ x] All other review of systems negative   [ ] Unable to obtain due to poor mentation     Does patient meet criteria for Severe Protein Calorie Malnutrition?  Yes [ ]  No [ ]  PPSV 30% or below [ ]  Anasarca [ ]  Albumin < 2 [ ] Catabolic State [ ] Poor nutritional intake [ ] Significant weight loss [ ]      Palliative Performance Status Version 2:         60%    Vital Signs Last 24 Hrs  T(C): 36.2 (16 May 2018 05:12), Max: 36.8 (15 May 2018 20:21)  T(F): 97.2 (16 May 2018 05:12), Max: 98.2 (15 May 2018 20:21)  HR: 67 (16 May 2018 05:12) (50 - 67)  BP: 108/65 (16 May 2018 05:12) (93/58 - 108/65)  BP(mean): --  RR: 16 (16 May 2018 05:12) (16 - 18)  SpO2: 100% (16 May 2018 05:12) (100% - 100%)    Physical Exam:    General: [x ] Alert,  A&O x 3    [ ] lethargic   [ ] Agitated   [ ] Cachexia   HEENT: [ ] Normal   [x ] Dry mouth   [ ] ET Tube    [ ] Trach   Lungs: [x ] Clear [ ] Rhonchi  [ ] Crackles [ ] Wheezing [ ] Tachypnea  [ ] Audible excessive secretions    Cardiovascular:  [ x] Regular rate and rhythm  [ ] Irregular [ ] Tachycardia   [ ] Bradycardia   Abdomen: [x ] Soft  [ ] Distended  [ ] +BS  [x ] Non tender [ ] Tender  [ ]PEG   [ ] NGT   Last BM:     Genitourinary: [ ] Normal [ x] Incontinent   [ ] Oliguria/Anuria   [ ] [place]  Musculoskeletal:  [ ] Normal   [ x] Generalized weakness  [ ] Bedbound  [ ] Edema   Neurological: [ ] No focal deficits  [ ] Cognitive impairment   [x] right hemiplegia  Skin: [x] Normal   [ ] Pressure ulcers     LABS:                        10.5   7.38  )-----------( 241      ( 16 May 2018 06:30 )             32.6     05-16    140  |  98  |  58<H>  ----------------------------<  82  4.8   |  27  |  2.01<H>    Ca    8.9      16 May 2018 06:30  Mg     2.3     05-16      I&O's Summary    RADIOLOGY & ADDITIONAL STUDIES:    EXAM:  CT ABDOMEN AND PELVIS IC    PROCEDURE DATE:  May  8 2018     IMPRESSION:    Passive congestion of the liver from cardiomyopathy.   Evidence of third spacing of fluid.    Agree with the preliminary report submitted at the time of the exam.    Referrals:  Hospice [ x]   Chaplaincy [ ]    Child Life [ ]   Social Work [ ]   Case Management [ ]   Holistic Therapy [ ]

## 2018-05-17 ENCOUNTER — TRANSCRIPTION ENCOUNTER (OUTPATIENT)
Age: 73
End: 2018-05-17

## 2018-05-17 LAB
BASOPHILS # BLD AUTO: 0.04 K/UL — SIGNIFICANT CHANGE UP (ref 0–0.2)
BASOPHILS NFR BLD AUTO: 0.6 % — SIGNIFICANT CHANGE UP (ref 0–2)
BUN SERPL-MCNC: 55 MG/DL — HIGH (ref 7–23)
CALCIUM SERPL-MCNC: 9.5 MG/DL — SIGNIFICANT CHANGE UP (ref 8.4–10.5)
CHLORIDE SERPL-SCNC: 99 MMOL/L — SIGNIFICANT CHANGE UP (ref 98–107)
CO2 SERPL-SCNC: 29 MMOL/L — SIGNIFICANT CHANGE UP (ref 22–31)
CREAT SERPL-MCNC: 1.75 MG/DL — HIGH (ref 0.5–1.3)
EOSINOPHIL # BLD AUTO: 0.1 K/UL — SIGNIFICANT CHANGE UP (ref 0–0.5)
EOSINOPHIL NFR BLD AUTO: 1.4 % — SIGNIFICANT CHANGE UP (ref 0–6)
GLUCOSE SERPL-MCNC: 90 MG/DL — SIGNIFICANT CHANGE UP (ref 70–99)
HCT VFR BLD CALC: 36.2 % — LOW (ref 39–50)
HGB BLD-MCNC: 11.7 G/DL — LOW (ref 13–17)
IMM GRANULOCYTES # BLD AUTO: 0.03 # — SIGNIFICANT CHANGE UP
IMM GRANULOCYTES NFR BLD AUTO: 0.4 % — SIGNIFICANT CHANGE UP (ref 0–1.5)
LYMPHOCYTES # BLD AUTO: 1.25 K/UL — SIGNIFICANT CHANGE UP (ref 1–3.3)
LYMPHOCYTES # BLD AUTO: 17.5 % — SIGNIFICANT CHANGE UP (ref 13–44)
MAGNESIUM SERPL-MCNC: 2.4 MG/DL — SIGNIFICANT CHANGE UP (ref 1.6–2.6)
MCHC RBC-ENTMCNC: 32.3 % — SIGNIFICANT CHANGE UP (ref 32–36)
MCHC RBC-ENTMCNC: 32.4 PG — SIGNIFICANT CHANGE UP (ref 27–34)
MCV RBC AUTO: 100.3 FL — HIGH (ref 80–100)
MONOCYTES # BLD AUTO: 0.7 K/UL — SIGNIFICANT CHANGE UP (ref 0–0.9)
MONOCYTES NFR BLD AUTO: 9.8 % — SIGNIFICANT CHANGE UP (ref 2–14)
NEUTROPHILS # BLD AUTO: 5.03 K/UL — SIGNIFICANT CHANGE UP (ref 1.8–7.4)
NEUTROPHILS NFR BLD AUTO: 70.3 % — SIGNIFICANT CHANGE UP (ref 43–77)
NRBC # FLD: 0.03 — SIGNIFICANT CHANGE UP
PLATELET # BLD AUTO: 282 K/UL — SIGNIFICANT CHANGE UP (ref 150–400)
PMV BLD: 10.5 FL — SIGNIFICANT CHANGE UP (ref 7–13)
POTASSIUM SERPL-MCNC: 4.1 MMOL/L — SIGNIFICANT CHANGE UP (ref 3.5–5.3)
POTASSIUM SERPL-SCNC: 4.1 MMOL/L — SIGNIFICANT CHANGE UP (ref 3.5–5.3)
RBC # BLD: 3.61 M/UL — LOW (ref 4.2–5.8)
RBC # FLD: 17.6 % — HIGH (ref 10.3–14.5)
SODIUM SERPL-SCNC: 140 MMOL/L — SIGNIFICANT CHANGE UP (ref 135–145)
WBC # BLD: 7.15 K/UL — SIGNIFICANT CHANGE UP (ref 3.8–10.5)
WBC # FLD AUTO: 7.15 K/UL — SIGNIFICANT CHANGE UP (ref 3.8–10.5)

## 2018-05-17 PROCEDURE — 99233 SBSQ HOSP IP/OBS HIGH 50: CPT

## 2018-05-17 PROCEDURE — 99232 SBSQ HOSP IP/OBS MODERATE 35: CPT | Mod: GC

## 2018-05-17 RX ORDER — APIXABAN 2.5 MG/1
5 TABLET, FILM COATED ORAL EVERY 12 HOURS
Qty: 0 | Refills: 0 | Status: DISCONTINUED | OUTPATIENT
Start: 2018-05-17 | End: 2018-05-19

## 2018-05-17 RX ADMIN — DORZOLAMIDE HYDROCHLORIDE 1 DROP(S): 20 SOLUTION/ DROPS OPHTHALMIC at 12:15

## 2018-05-17 RX ADMIN — APIXABAN 5 MILLIGRAM(S): 2.5 TABLET, FILM COATED ORAL at 23:29

## 2018-05-17 RX ADMIN — FAMOTIDINE 20 MILLIGRAM(S): 10 INJECTION INTRAVENOUS at 12:14

## 2018-05-17 RX ADMIN — APIXABAN 2.5 MILLIGRAM(S): 2.5 TABLET, FILM COATED ORAL at 09:38

## 2018-05-17 RX ADMIN — SODIUM CHLORIDE 3 MILLILITER(S): 9 INJECTION INTRAMUSCULAR; INTRAVENOUS; SUBCUTANEOUS at 12:13

## 2018-05-17 RX ADMIN — AMIODARONE HYDROCHLORIDE 200 MILLIGRAM(S): 400 TABLET ORAL at 09:38

## 2018-05-17 RX ADMIN — MIRTAZAPINE 45 MILLIGRAM(S): 45 TABLET, ORALLY DISINTEGRATING ORAL at 23:29

## 2018-05-17 RX ADMIN — SODIUM CHLORIDE 3 MILLILITER(S): 9 INJECTION INTRAMUSCULAR; INTRAVENOUS; SUBCUTANEOUS at 00:49

## 2018-05-17 RX ADMIN — SODIUM CHLORIDE 3 MILLILITER(S): 9 INJECTION INTRAMUSCULAR; INTRAVENOUS; SUBCUTANEOUS at 05:20

## 2018-05-17 RX ADMIN — ATORVASTATIN CALCIUM 20 MILLIGRAM(S): 80 TABLET, FILM COATED ORAL at 23:29

## 2018-05-17 RX ADMIN — SODIUM CHLORIDE 3 MILLILITER(S): 9 INJECTION INTRAMUSCULAR; INTRAVENOUS; SUBCUTANEOUS at 23:37

## 2018-05-17 NOTE — GOALS OF CARE CONVERSATION - PERSONAL ADVANCE DIRECTIVE - CONVERSATION DETAILS
[facility] - revisit    Meeting held with pt's spouse, Robyn Salomon. Reviewed hospice care, services eligibility and consents. Discussed Advance directives. Questions and concerns about hospice care at home discussed. Consents for hospice care and [facility] advance directive form signed. DME: o2 concentrator for 2 LPM via nc, prn,  and air pressure pad for hospital bed ordered for delivery from [facility] on Friday [place]18/18. Request was made for delivery before 12 noon. Pt already has a hospital bed, w/c and walker at home. [facility] ready to provide care - once delivery of o2 at pt's home has been confirmed.

## 2018-05-17 NOTE — DISCHARGE NOTE ADULT - PLAN OF CARE
To relieve and prevent worsening symptoms associated with congestive heart failure, to improve quality of life, and to treat underlying conditions such as coronary heart disease, high blood pressure, or diabetes, and to maintain euvolemia. Low salt diet, fluid restriction to 1000 ml daily, monitor your fluid intake and weight daily, and follow up with your cardiologist within 1 to 2 weeks; call for appointment. To restore or maintain a normal heart rate and rhythm, to prevent blood clots, and decrease the risks of stroke CVA/TIA. Please take your medications as prescribed.  Continue to take your blood thinner (apixaban) as prescribed.  Low fat diet & reduce caffeine intake. Continue mirtazapine as prescribed.  Follow up with your PCP within 1-2 weeks. Follow up with your oncologist for further management; call for appointment. Continue physical therapy and skills learned for rehabilitation.  Follow up with your neurologist. Continue to take your medications as prescribed, low salt, low fat, and low sugar diet. Outpatient oncology follow up. To help recover or improve as much as possible your sensory and motor abilities, to become more independent, and prevent future strokes. Outpatient repeat thyroid function testing. You will need outpatient follow up with your PCP for repeat thyroid function testing; call for appointment. To prevent shortness of breath, fluid overload, and electrolytes imbalance, and to slow down worsening kidney disease. Follow up with your PCP for ongoing monitoring of your kidney function; call for appointment.  Continue blood pressure, cholesterol and diabetic medications. Goal of hemoglobin A1C (HgbA1C) < 7%.  Avoid nephrotoxic drugs such as nonsteroidal anti-inflammatory agents (NSAIDs). Control of mood.

## 2018-05-17 NOTE — DISCHARGE NOTE ADULT - SECONDARY DIAGNOSIS.
Atrial fibrillation Depression Prostate cancer CVA (cerebral vascular accident) Hypothyroid Acute on chronic renal failure

## 2018-05-17 NOTE — PROGRESS NOTE ADULT - SUBJECTIVE AND OBJECTIVE BOX
Patient is a 73y old  Male who presents with a chief complaint of Swelling (08 May 2018 20:22)    SUBJECTIVE / OVERNIGHT EVENTS: Patient seen and examined. No acute overnight events. BP better but still low. No current complaints. Denies CP, denies SOB. Denies n/v or abdominal pain.     Review of Systems: As per above     MEDICATIONS  (STANDING):  amiodarone    Tablet 200 milliGRAM(s) Oral daily  apixaban 2.5 milliGRAM(s) Oral every 12 hours  atorvastatin 20 milliGRAM(s) Oral at bedtime  brimonidine 0.2% Ophthalmic Solution 1 Drop(s) Both EYES two times a day  dorzolamide 2% Ophthalmic Solution      dorzolamide 2% Ophthalmic Solution 1 Drop(s) Both EYES three times a day  famotidine    Tablet 20 milliGRAM(s) Oral daily  latanoprost 0.005% Ophthalmic Solution 1 Drop(s) Both EYES at bedtime  mirtazapine 45 milliGRAM(s) Oral at bedtime  sodium chloride 0.9% lock flush 3 milliLiter(s) IV Push every 8 hours    MEDICATIONS  (PRN):      PHYSICAL EXAM:  Vital Signs Last 24 Hrs  T(C): 36.4 (17 May 2018 12:13), Max: 36.4 (17 May 2018 05:18)  T(F): 97.5 (17 May 2018 12:13), Max: 97.5 (17 May 2018 05:18)  HR: 60 (17 May 2018 12:13) (54 - 70)  BP: 111/73 (17 May 2018 12:13) (98/54 - 111/73)  BP(mean): --  RR: 18 (17 May 2018 12:13) (16 - 18)  SpO2: 100% (17 May 2018 12:13) (100% - 100%)    PHYSICAL EXAM  GENERAL: older man, responding to questions with one-word answers, NAD  HEAD:  Atraumatic, Normocephalic  NECK: Supple, JVP mild  CHEST/LUNG: Clear to auscultation bilaterally; No wheeze  HEART: Regular rate and rhythm; No murmurs, rubs, or gallops  ABDOMEN: Soft, Nontender, Nondistended; Bowel sounds present  EXTREMITIES:  2+ Peripheral Pulses, No clubbing, cyanosis.   NEURO: Expressive aphasia   : [place] cath draining yellow urine     LABS:                        11.7   7.15  )-----------( 282      ( 17 May 2018 09:31 )             36.2   05-17    140  |  99  |  55<H>  ----------------------------<  90  4.1   |  29  |  1.75<H>    Ca    9.5      17 May 2018 09:31  Mg     2.4     05-17        RADIOLOGY & ADDITIONAL TESTS:    Imaging Personally Reviewed:    Consultant(s) Notes Reviewed:  CHF    Care Discussed with Consultants/Other Providers: CHF team/tele PA/palliative care

## 2018-05-17 NOTE — DISCHARGE NOTE ADULT - HOSPITAL COURSE
HPI:  History provided by the pt's emergency contact, son Jon Salomon , who is currently not present at the bedside and the chart.   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 The pt was advised to go the ED due to anasarca and a concern for CHF exacerbation and urinary retention.  Pt is aphasic at baseline and has difficulty communicating. Per the son, the pt does not monitor his salt intake, has been complaint with all of his medications, Positive orthopnea, Dyspnea on minimal exertion, b/l LE swelling, atraumatic fall 1 week ago and did not seek medical attention. No chest pain, palpitations, nausea, vomit, chills, diaphoresis, diarrhea, constipation, dysuria, hematuria.  In the Ed, the pt is found to be fluid over loaded and was give Lasix 40 mg IV x 1 with positive urine output. (08 May 2018 20:22)    On admission:   EKG: V-paced @ 105b/ min, QT/ QTC= 312/ 412  CE x 2 (Trop: 0.15-->0.20)                     BNP: 13,304            TSH: 5.55                HgbA1-c: 6.4                   UA - large blood, trace leuks  5/8 CXR - Small layering right pleural effusion associated with passive atelectasis.  5/8 CT Abd/Pelvis - Small to moderate ascites. Moderate right. pleural effusion with adjacent compressive atelectasis of the right lung.  5/11 TTE EF 40% 1. Tethered mitral valve leaflets. Minimal mitral regurgitation. 2. Severe concentric left ventricular hypertrophy. 3. Moderate global left ventricular systolic dysfunction. 4. Right ventricular enlargement with decreased right ventricular systolic function. A device wire is noted in the right heart.  ICD interrogation: Normal PPM / ICD function. Normal sensing and pacing via iterative testing. Good battery status. Excellent threshold capture.  No reprogramming. No events corresponding to this admission    Evaluated by renal: Patient is a 72 y/o man with CHF with reduced EF, CVA w/ residual right weakness, afib on a/c, prostate ca, HTN who presented with acute CHF exacerbation and developed BRYCE inpatient. Suspected due to IV contrast + diuresis + hypotension.  Pending repeat U/A with ulytes and protein / creatinine.  Renal function improving off ACE, diuretics.    Evaluated by CHF: 72 y/o M w/ h/o HFrEF (EF 15%, LVEDD 3.3 cm), prior CVA 2013 w/ residual expressive aphasia and R hemiparesis, afib (on Eliquis), prostate CA s/p brachytherapy, ? temporal arteritis who presented on 5/8 referred by primary care for b/l LE edema, ascites in setting of apparent dietary indiscretion. On exam, elevated JVD, NAD, RRR, no m/r/g, lungs clear, abdomen distended, 2+ LE b/l. Labs reviewed - BUN/Cr 23/1.13 (from 1.56 3/18), Hb 12.1, Trop T 0.2. TTE 8/17 reviewed - severe concentric LVH (septum 1.7 cm, PW 1.7 cm), LVEDD 3.3 cm, EF 15%, biatrial enlargement, RV dysfunction, moderate MR/TR. 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 Volume status greatly improved, but still continues to have moderate JVP, could be related to stiff atria/amyloid.  Hypotensive overnight on 5/15/18 w/ BRYCE. Pt not eating and drinking well. Hypovolemic.  Acute on chronic systolic heart failure: -TTE strongly suggests amyloid cardiomyopathy. -BRYCE and borderline hypotensive. He is not eating and drinking well. Observed pt drink about 200 ml of fluid and will give pt another 250 ml over 5 hrs. -Holding all GDMT for HF as he is hypotensive w/ BRYCE currently. Will try to re-introduce meds over next few days. -Follow up transthyretin labs. Acute on chronic renal failure: -BRYCE likely from hypotension and hypovolemia from poor PO intake. -Discontinued torsemide, Toprol and nahid and lisinopril.    Evaluated by palliative: 73 year old man with CHF, BRYCE, SOB, debility and encounter for palliative care. CHF (congestive heart failure). Patient with CHF with EF of 14%. Patient presented initially with CHF exacerbation, was diuresed. Patient would be hospice appropriate for home. BRYCE (acute kidney injury): Most likely secondary to diuresis. Appreciate nephrology involvement. Shortness of breath: No active shortness of breath, if shortness of breath presents would consider low dose Dilaudid 0.2mg q3h PRN. Debility: PPS 60%. Skin care PRN. Assist with ADLS' PRN. Encounter for palliative care: Spoke to patients wife over the phone. She understands overall poor prognosis and would like to be able to take her  home. Hospice was explained to her and a referral was made.     Patient cleared for discharge home with home hospice. HPI:  History provided by the pt's emergency contact, son Jon Salomon , who is currently not present at the bedside and the chart.   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 The pt was advised to go the ED due to anasarca and a concern for CHF exacerbation and urinary retention.  Pt is aphasic at baseline and has difficulty communicating. Per the son, the pt does not monitor his salt intake, has been complaint with all of his medications, Positive orthopnea, Dyspnea on minimal exertion, b/l LE swelling, atraumatic fall 1 week ago and did not seek medical attention. No chest pain, palpitations, nausea, vomit, chills, diaphoresis, diarrhea, constipation, dysuria, hematuria.  In the Ed, the pt is found to be fluid over loaded and was give Lasix 40 mg IV x 1 with positive urine output. (08 May 2018 20:22)    On admission:   EKG: V-paced @ 105b/ min, QT/ QTC= 312/ 412  CE x 2 (Trop: 0.15-->0.20)                     BNP: 13,304            TSH: 5.55                HgbA1-c: 6.4                   UA - large blood, trace leuks  5/8 CXR - Small layering right pleural effusion associated with passive atelectasis.  5/8 CT Abd/Pelvis - Small to moderate ascites. Moderate right. pleural effusion with adjacent compressive atelectasis of the right lung.  5/11 TTE EF 40% 1. Tethered mitral valve leaflets. Minimal mitral regurgitation. 2. Severe concentric left ventricular hypertrophy. 3. Moderate global left ventricular systolic dysfunction. 4. Right ventricular enlargement with decreased right ventricular systolic function. A device wire is noted in the right heart.  ICD interrogation: Normal PPM / ICD function. Normal sensing and pacing via iterative testing. Good battery status. Excellent threshold capture.  No reprogramming. No events corresponding to this admission    Evaluated by renal: Patient is a 72 y/o man with CHF with reduced EF, CVA w/ residual right weakness, afib on a/c, prostate ca, HTN who presented with acute CHF exacerbation and developed BRYCE inpatient. Suspected due to IV contrast + diuresis + hypotension.  Pending repeat U/A with ulytes and protein / creatinine.  Renal function improving off ACE, diuretics.    Evaluated by CHF: 72 y/o M w/ h/o HFrEF (EF 15%, LVEDD 3.3 cm), prior CVA 2013 w/ residual expressive aphasia and R hemiparesis, afib (on Eliquis), prostate CA s/p brachytherapy, ? temporal arteritis who presented on 5/8 referred by primary care for b/l LE edema, ascites in setting of apparent dietary indiscretion. On exam, elevated JVD, NAD, RRR, no m/r/g, lungs clear, abdomen distended, 2+ LE b/l. Labs reviewed - BUN/Cr 23/1.13 (from 1.56 3/18), Hb 12.1, Trop T 0.2. TTE 8/17 reviewed - severe concentric LVH (septum 1.7 cm, PW 1.7 cm), LVEDD 3.3 cm, EF 15%, biatrial enlargement, RV dysfunction, moderate MR/TR. 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 Volume status greatly improved, but still continues to have moderate JVP, could be related to stiff atria/amyloid.  Hypotensive overnight on 5/15/18 w/ BRYCE. Pt not eating and drinking well. Hypovolemic.  Acute on chronic systolic heart failure: -TTE strongly suggests amyloid cardiomyopathy. -BRYCE and borderline hypotensive. He is not eating and drinking well. Observed pt drink about 200 ml of fluid and will give pt another 250 ml over 5 hrs. -Holding all GDMT for HF as he is hypotensive w/ BRYCE currently. Will try to re-introduce meds over next few days. -Follow up transthyretin labs. Acute on chronic renal failure: -BRYCE likely from hypotension and hypovolemia from poor PO intake. -Discontinued torsemide, Toprol and nahid and lisinopril.    Evaluated by palliative: 73 year old man with CHF, BRYCE, SOB, debility and encounter for palliative care. CHF (congestive heart failure). Patient with CHF with EF of 14%. Patient presented initially with CHF exacerbation, was diuresed. Patient would be hospice appropriate for home. BRYCE (acute kidney injury): Most likely secondary to diuresis. Appreciate nephrology involvement. Shortness of breath: No active shortness of breath, if shortness of breath presents would consider low dose Dilaudid 0.2mg q3h PRN. Debility: PPS 60%. Skin care PRN. Assist with ADLS' PRN. Encounter for palliative care: Spoke to patients wife over the phone. She understands overall poor prognosis and would like to be able to take her  home. Hospice was explained to her and a referral was made.     Evaluated by medicine: 73M CHFrEF (last EF 14% Aug 2017), Afib on Eliquis, PPM, CVA with R sided hemiparesis and expressive aphasia, HTN, temporal arteritis s/p rx, p/w acute on chronic HFref exacerbation likely due to medication non-adherence, course now complicated by hypotension and BRYCE, made home hospice and awaiting discharge.   -Acute on chronic systolic congestive heart failure: - S/p TTE 5/11 personally reviewed and showed EF 40% with decreased RV function; likely due to amyloidosis per cardio (appreciated) - CHF recs appreciated; will hold all diuretics, ACE INH, and and toprol for now given hypotension and BRYCE. Will f/u re-initiation of above since now going home with hospice - Low salt diet - Monitor BPs closely - Poor prognosis given poor functional status and severe systolic CHF suspected to be due to amyloidosis - Palliative care consulted; appreciate recs. Home hospice approved.   -BRYCE (acute kidney injury): Baseline SCr appears to be ~1.1; likely due to ATN from volume contraction and hypotension - SCr now downtrending - Renal recs appreciated; will hold all anti-HTN's including diuretics and ACE INH at this time  -monitor BMP - Keep MAP > 65 if possible.   -Paroxysmal atrial fibrillation: - CHADSVASC= 5 - Rate control Amiodarone; BB now on hold given hypotension - Change Eliquis dose back to 5mg BID given improvement in CrCl.   -Hypokalemia: Resolved  -CVA, old, hemiparesis: - Continue BP control. - On Eliquis - c/w Lipitor 20mg.   -Elevated TSH: TSH 5.55 - free T4 slightly elevated and T3 decreased c/w sick euthyroid picture. Would repeat TFTs as outpt when pt stable. -currently no signs or symptoms of thyroid disease clinically.  -Episode of recurrent major depressive disorder, unspecified depression episode severity: - Continue Remeron.   -Glaucoma: Continue Eye drops.   -Prostate cancer: Currently in remission. Outpt f/u.       Patient cleared for discharge home with home hospice.

## 2018-05-17 NOTE — DISCHARGE NOTE ADULT - ADDITIONAL INSTRUCTIONS
You will be discharged home with home hospice.  Follow up with your PCP & cardiology within 1-2 weeks; call for appointments. You will be discharged home with home hospice.  Follow up with your PCP & cardiology within 1-2 weeks; call for appointments.  Follow up with your oncologist.

## 2018-05-17 NOTE — DISCHARGE NOTE ADULT - MEDICATION SUMMARY - MEDICATIONS TO STOP TAKING
I will STOP taking the medications listed below when I get home from the hospital:    Coreg 6.25 mg oral tablet  -- 1 tab(s) by mouth 2 times a day    Lasix 40 mg oral tablet  -- 1 tab(s) by mouth 2 times a day    lisinopril 10 mg oral tablet  -- 1 tab(s) by mouth once a day    isosorbide mononitrate 60 mg oral tablet, extended release  -- 1 tab(s) by mouth once a day (in the morning)    metOLazone 2.5 mg oral tablet  -- 1 tab(s) by mouth every 3 days (with furosemide)

## 2018-05-17 NOTE — PROGRESS NOTE ADULT - PROBLEM SELECTOR PLAN 2
Baseline SCr appears to be ~1.1; likely due to ATN from volume contraction and hypotension   - SCr now downtrending   - Renal recs appreciated; will hold all anti-HTN's including diuretics and ACE INH at this time   - Monitor BMP  - Keep MAP > 65 if possible

## 2018-05-17 NOTE — DISCHARGE NOTE ADULT - CARE PROVIDER_API CALL
Abi Eubanks  PRIMARY CARE PROVIDER  Phone: [phone]  Fax: [phone]   )    -    Rick Cohen), Adv Heart Fail Trnsplnt Cardio  [address] [place] [address]  Phone: [phone]  Fax: [phone]    Thomas Gilmore), Cardiac Electrophysiology; Cardiovascular Disease; Internal Medicine  [address] [place] [address]  [address] [place] [address]  Phone: [phone]  Fax: [phone]

## 2018-05-17 NOTE — DISCHARGE NOTE ADULT - CARE PLAN
Principal Discharge DX:	CHF (congestive heart failure)  Goal:	To relieve and prevent worsening symptoms associated with congestive heart failure, to improve quality of life, and to treat underlying conditions such as coronary heart disease, high blood pressure, or diabetes, and to maintain euvolemia.  Assessment and plan of treatment:	Low salt diet, fluid restriction to 1000 ml daily, monitor your fluid intake and weight daily, and follow up with your cardiologist within 1 to 2 weeks; call for appointment.  Secondary Diagnosis:	Atrial fibrillation  Goal:	To restore or maintain a normal heart rate and rhythm, to prevent blood clots, and decrease the risks of stroke CVA/TIA.  Assessment and plan of treatment:	Please take your medications as prescribed.  Continue to take your blood thinner (apixaban) as prescribed.  Low fat diet & reduce caffeine intake.  Secondary Diagnosis:	Depression  Assessment and plan of treatment:	Continue mirtazapine as prescribed.  Follow up with your PCP within 1-2 weeks.  Secondary Diagnosis:	Prostate cancer  Assessment and plan of treatment:	Follow up with your oncologist for further management; call for appointment.  Secondary Diagnosis:	CVA (cerebral vascular accident)  Assessment and plan of treatment:	Continue physical therapy and skills learned for rehabilitation.  Follow up with your neurologist. Continue to take your medications as prescribed, low salt, low fat, and low sugar diet. Principal Discharge DX:	CHF (congestive heart failure)  Goal:	To relieve and prevent worsening symptoms associated with congestive heart failure, to improve quality of life, and to treat underlying conditions such as coronary heart disease, high blood pressure, or diabetes, and to maintain euvolemia.  Assessment and plan of treatment:	Low salt diet, fluid restriction to 1000 ml daily, monitor your fluid intake and weight daily, and follow up with your cardiologist within 1 to 2 weeks; call for appointment.  Secondary Diagnosis:	Atrial fibrillation  Goal:	To restore or maintain a normal heart rate and rhythm, to prevent blood clots, and decrease the risks of stroke CVA/TIA.  Assessment and plan of treatment:	Please take your medications as prescribed.  Continue to take your blood thinner (apixaban) as prescribed.  Low fat diet & reduce caffeine intake.  Secondary Diagnosis:	Depression  Goal:	Control of mood.  Assessment and plan of treatment:	Continue mirtazapine as prescribed.  Follow up with your PCP within 1-2 weeks.  Secondary Diagnosis:	Prostate cancer  Goal:	Outpatient oncology follow up.  Assessment and plan of treatment:	Follow up with your oncologist for further management; call for appointment.  Secondary Diagnosis:	CVA (cerebral vascular accident)  Goal:	To help recover or improve as much as possible your sensory and motor abilities, to become more independent, and prevent future strokes.  Assessment and plan of treatment:	Continue physical therapy and skills learned for rehabilitation.  Follow up with your neurologist. Continue to take your medications as prescribed, low salt, low fat, and low sugar diet.  Secondary Diagnosis:	Hypothyroid  Goal:	Outpatient repeat thyroid function testing.  Assessment and plan of treatment:	You will need outpatient follow up with your PCP for repeat thyroid function testing; call for appointment.  Secondary Diagnosis:	Acute on chronic renal failure  Goal:	To prevent shortness of breath, fluid overload, and electrolytes imbalance, and to slow down worsening kidney disease.  Assessment and plan of treatment:	Follow up with your PCP for ongoing monitoring of your kidney function; call for appointment.  Continue blood pressure, cholesterol and diabetic medications. Goal of hemoglobin A1C (HgbA1C) < 7%.  Avoid nephrotoxic drugs such as nonsteroidal anti-inflammatory agents (NSAIDs).

## 2018-05-17 NOTE — DISCHARGE NOTE ADULT - PATIENT PORTAL LINK FT
You can access the [email or web address] Patient Portal, offered by [facility], by registering with the following website: [email or web address]

## 2018-05-17 NOTE — PROGRESS NOTE ADULT - PROBLEM SELECTOR PLAN 1
- S/p TTE 5/11 personally reviewed and showed EF 40% with decreased RV function; likely due to amyloidosis per cardio (appreciated)   - CHF recs appreciated; will hold all diuretics, ACE INH, and and toprol for now given hypotension and BRYCE. Will f/u re-initiation of above since now going home with hospice   - Low salt diet  - Monitor BPs closely  - Poor prognosis given poor functional status and severe systolic CHF suspected to be due to amyloidosis  - Palliative care consulted; appreciate recs. Home hospice approved

## 2018-05-17 NOTE — PROGRESS NOTE ADULT - ATTENDING COMMENTS
Dispo- Patient approved for home hospice. Awaiting delivery of supplies. Family on board with plan per palliative care discussion

## 2018-05-17 NOTE — DISCHARGE NOTE ADULT - PROVIDER TOKENS
FREE:[LAST:[Cha],FIRST:[Abi],PHONE:[phone]],FAX:[phone]   [phone]    -],ADDRESS:[PRIMARY CARE PROVIDER]],TOKEN:'[id number]',TOKEN:'[id number]'

## 2018-05-17 NOTE — DISCHARGE NOTE ADULT - CARE PROVIDERS DIRECT ADDRESSES
,DirectAddress_Unknown,[email or web address]

## 2018-05-17 NOTE — DISCHARGE NOTE ADULT - MEDICATION SUMMARY - MEDICATIONS TO TAKE
I will START or STAY ON the medications listed below when I get home from the hospital:    amiodarone 200 mg oral tablet  -- 1 tab(s) by mouth once a day  -- Indication: For Acute on chronic systolic heart failure    apixaban 5 mg oral tablet  -- 1 tab(s) by mouth every 12 hours  -- Indication: For Atrial fibrillation    mirtazapine 45 mg oral tablet  -- 1 tab(s) by mouth once a day (at bedtime)  -- Indication: For Depression    atorvastatin 20 mg oral tablet  -- 1 tab(s) by mouth once a day (at bedtime)  -- Indication: For Hyperlipidemia     torsemide 20 mg oral tablet  -- 1 tab(s) by mouth once a day  -- Indication: For Acute on chronic systolic congestive heart failure    raNITIdine 150 mg oral tablet  -- 1 tab(s) by mouth 2 times a day  -- Indication: For GERD    Lumigan 0.01% ophthalmic solution  -- 1 drop(s) to each affected eye once a day (in the evening)  -- Indication: For Glaucoma    Simbrinza 1%- 0.2% ophthalmic suspension  -- 1 drop(s) to each affected eye 2 times a day  -- Indication: For Glaucoma

## 2018-05-17 NOTE — PROGRESS NOTE ADULT - SUBJECTIVE AND OBJECTIVE BOX
[facility] DIVISION OF KIDNEY DISEASES AND HYPERTENSION -- FOLLOW UP NOTE  --------------------------------------------------------------------------------  Chief Complaint:  BRYCE    24 hour events/subjective:  Patient referred to hospice care yesterday.  Patient currently reports feeling well.        PAST HISTORY  --------------------------------------------------------------------------------  No significant changes to PMH, PSH, FHx, SHx, unless otherwise noted    ALLERGIES & MEDICATIONS  --------------------------------------------------------------------------------  Allergies    No Known Allergies    Intolerances      Standing Inpatient Medications  amiodarone    Tablet 200 milliGRAM(s) Oral daily  apixaban 2.5 milliGRAM(s) Oral every 12 hours  atorvastatin 20 milliGRAM(s) Oral at bedtime  brimonidine 0.2% Ophthalmic Solution 1 Drop(s) Both EYES two times a day  dorzolamide 2% Ophthalmic Solution      dorzolamide 2% Ophthalmic Solution 1 Drop(s) Both EYES three times a day  famotidine    Tablet 20 milliGRAM(s) Oral daily  latanoprost 0.005% Ophthalmic Solution 1 Drop(s) Both EYES at bedtime  mirtazapine 45 milliGRAM(s) Oral at bedtime  sodium chloride 0.9% lock flush 3 milliLiter(s) IV Push every 8 hours    PRN Inpatient Medications      REVIEW OF SYSTEMS  --------------------------------------------------------------------------------  Gen: No fevers/chills  Skin: No rashes  Head/Eyes/Ears/Mouth: No headache  Respiratory: No dyspnea  CV: No chest pain  GI: No abdominal pain  : No dysuria  MSK: No edema  Neuro: No dizziness/lightheadedness    VITALS/PHYSICAL EXAM  --------------------------------------------------------------------------------  T(C): 36.4 (05-17-18 @ 05:18), Max: 36.9 (05-16-18 @ 13:04)  HR: 67 (05-17-18 @ 09:38) (54 - 70)  BP: 109/69 (05-17-18 @ 09:38) (98/54 - 109/75)  RR: 18 (05-17-18 @ 09:38) (16 - 18)  SpO2: 100% (05-17-18 @ 09:38) (98% - 100%)  Wt(kg): --        05-16-18 @ 07:01  -  05-17-18 @ 07:00  --------------------------------------------------------  IN: 450 mL / OUT: 0 mL / NET: 450 mL      Physical Exam:  	Gen: NAD, well-appearing  	HEENT: MMM  	Pulm: CTA B/L  	CV: RRR, S1S2; no rub  	Abd: +BS, soft, nontender/nondistended  	: No suprapubic tenderness  	UE:  no edema  	LE:  no pitting edema  	Neuro: No focal deficits  	Psych: Normal affect and mood  	Skin: Warm    LABS/STUDIES  --------------------------------------------------------------------------------              11.7   7.15  >-----------<  282      [05-17-18 @ 09:31]              36.2     140  |  99  |  55  ----------------------------<  90      [05-17-18 @ 09:31]  4.1   |  29  |  1.75        Ca     9.5     [05-17-18 @ 09:31]      Mg     2.4     [05-17-18 @ 09:31]    Creatinine Trend:  SCr 1.75 [05-17 @ 09:31]  SCr 2.01 [05-16 @ 06:30]  SCr 1.89 [05-15 @ 06:19]  SCr 1.43 [05-14 @ 06:34]  SCr 1.54 [05-13 @ 06:52]

## 2018-05-18 PROCEDURE — 99233 SBSQ HOSP IP/OBS HIGH 50: CPT

## 2018-05-18 RX ORDER — POTASSIUM CHLORIDE 20 MEQ
1 PACKET (EA) ORAL
Qty: 0 | Refills: 0 | COMMUNITY

## 2018-05-18 RX ORDER — AMIODARONE HYDROCHLORIDE 400 MG/1
2 TABLET ORAL
Qty: 0 | Refills: 0 | COMMUNITY

## 2018-05-18 RX ORDER — APIXABAN 2.5 MG/1
1 TABLET, FILM COATED ORAL
Qty: 0 | Refills: 0 | COMMUNITY

## 2018-05-18 RX ORDER — AMIODARONE HYDROCHLORIDE 400 MG/1
1 TABLET ORAL
Qty: 0 | Refills: 0 | COMMUNITY
Start: 2018-05-18

## 2018-05-18 RX ORDER — ATORVASTATIN CALCIUM 80 MG/1
1 TABLET, FILM COATED ORAL
Qty: 0 | Refills: 0 | COMMUNITY
Start: 2018-05-18

## 2018-05-18 RX ORDER — APIXABAN 2.5 MG/1
1 TABLET, FILM COATED ORAL
Qty: 0 | Refills: 0 | COMMUNITY
Start: 2018-05-18

## 2018-05-18 RX ADMIN — Medication 20 MILLIGRAM(S): at 10:42

## 2018-05-18 RX ADMIN — BRIMONIDINE TARTRATE 1 DROP(S): 2 SOLUTION/ DROPS OPHTHALMIC at 17:20

## 2018-05-18 RX ADMIN — MIRTAZAPINE 45 MILLIGRAM(S): 45 TABLET, ORALLY DISINTEGRATING ORAL at 21:54

## 2018-05-18 RX ADMIN — ATORVASTATIN CALCIUM 20 MILLIGRAM(S): 80 TABLET, FILM COATED ORAL at 21:54

## 2018-05-18 RX ADMIN — APIXABAN 5 MILLIGRAM(S): 2.5 TABLET, FILM COATED ORAL at 17:20

## 2018-05-18 RX ADMIN — FAMOTIDINE 20 MILLIGRAM(S): 10 INJECTION INTRAVENOUS at 12:48

## 2018-05-18 RX ADMIN — SODIUM CHLORIDE 3 MILLILITER(S): 9 INJECTION INTRAMUSCULAR; INTRAVENOUS; SUBCUTANEOUS at 21:59

## 2018-05-18 RX ADMIN — DORZOLAMIDE HYDROCHLORIDE 1 DROP(S): 20 SOLUTION/ DROPS OPHTHALMIC at 21:56

## 2018-05-18 RX ADMIN — DORZOLAMIDE HYDROCHLORIDE 1 DROP(S): 20 SOLUTION/ DROPS OPHTHALMIC at 12:49

## 2018-05-18 RX ADMIN — LATANOPROST 1 DROP(S): 0.05 SOLUTION/ DROPS OPHTHALMIC; TOPICAL at 21:56

## 2018-05-18 NOTE — PROGRESS NOTE ADULT - ATTENDING COMMENTS
Dispo- Patient approved for home hospice. Awaiting delivery of supplies. Family on board with plan per palliative care discussion. Plan for discharge home with hospice tomorrow.

## 2018-05-18 NOTE — GOALS OF CARE CONVERSATION - PERSONAL ADVANCE DIRECTIVE - CONVERSATION DETAILS
[facility] - follow up    Confirmed with pt's spouse Robyn Salomon ([phone]) o2 concentrator has been received at pt's home. [facility] is ready to provide care. Hospice documents (consents) have been faxed to Hospice office. Weekend and Evening Teams have been informed pt is awaiting discharge from hospital.     Please call [facility] when pt is discharged from the hospital so that an RN visit to pt's home can be scheduled.    Hospice 24/7 tel # [phone].

## 2018-05-18 NOTE — PROGRESS NOTE ADULT - SUBJECTIVE AND OBJECTIVE BOX
Patient is a 73y old  Male who presents with a chief complaint of Swelling (08 May 2018 20:22)    SUBJECTIVE / OVERNIGHT EVENTS: Patient seen and examined. No acute overnight events. BP better but still low. No current complaints. Denies CP, denies SOB. Denies n/v or abdominal pain.     Review of Systems: As per above     MEDICATIONS  (STANDING):  amiodarone    Tablet 200 milliGRAM(s) Oral daily  apixaban 5 milliGRAM(s) Oral every 12 hours  atorvastatin 20 milliGRAM(s) Oral at bedtime  brimonidine 0.2% Ophthalmic Solution 1 Drop(s) Both EYES two times a day  dorzolamide 2% Ophthalmic Solution      dorzolamide 2% Ophthalmic Solution 1 Drop(s) Both EYES three times a day  famotidine    Tablet 20 milliGRAM(s) Oral daily  latanoprost 0.005% Ophthalmic Solution 1 Drop(s) Both EYES at bedtime  mirtazapine 45 milliGRAM(s) Oral at bedtime  sodium chloride 0.9% lock flush 3 milliLiter(s) IV Push every 8 hours  torsemide 20 milliGRAM(s) Oral daily    PHYSICAL EXAM:  Vital Signs Last 24 Hrs  T(C): 36.6 (18 May 2018 13:18), Max: 36.6 (18 May 2018 13:18)  T(F): 97.9 (18 May 2018 13:18), Max: 97.9 (18 May 2018 13:18)  HR: 80 (18 May 2018 13:18) (77 - 80)  BP: 112/74 (18 May 2018 13:18) (102/58 - 112/74)  BP(mean): --  RR: 17 (18 May 2018 13:18) (17 - 17)  SpO2: 100% (18 May 2018 13:18) (100% - 100%)    GENERAL: older man, responding to questions with one-word answers, NAD  HEAD:  Atraumatic, Normocephalic  NECK: Supple, JVP mild  CHEST/LUNG: Clear to auscultation bilaterally; No wheeze  HEART: Regular rate and rhythm; No murmurs, rubs, or gallops  ABDOMEN: Soft, Nontender, Nondistended; Bowel sounds present  EXTREMITIES:  2+ Peripheral Pulses, No clubbing, cyanosis.   NEURO: Expressive aphasia   : [place] cath draining yellow urine     LABS:                        11.7   7.15  )-----------( 282      ( 17 May 2018 09:31 )             36.2   05-17    140  |  99  |  55<H>  ----------------------------<  90  4.1   |  29  |  1.75<H>    Ca    9.5      17 May 2018 09:31  Mg     2.4     05-17    RADIOLOGY & ADDITIONAL TESTS:    Imaging Personally Reviewed:    Consultant(s) Notes Reviewed:  CHF    Care Discussed with Consultants/Other Providers: CHF team/tele PA/palliative care

## 2018-05-18 NOTE — PROGRESS NOTE ADULT - PROBLEM SELECTOR PLAN 10
VTE with Eliquis.  Fall, Aspirations, safety and seizure precautions.
VTE with Eliquis.  Fall, Aspirations, safety and seizure precautions.  Would dc gomes ar this time
VTE with Eliquis.  Fall, Aspirations, safety and seizure precautions.

## 2018-05-18 NOTE — PROGRESS NOTE ADULT - NSHPATTENDINGPLANDISCUSS_GEN_ALL_CORE
medicine team
medical team
Tele PA
Tele PA/CHF team
Tele PA/CHF team/palliative care

## 2018-05-18 NOTE — PROGRESS NOTE ADULT - PROBLEM SELECTOR PLAN 2
Baseline SCr appears to be ~1.1; likely due to ATN from volume contraction and hypotension   - SCr now downtrending   - Renal recs appreciated; will hold all anti-HTN's and ACE INH at this time   - Monitor BMP  - Keep MAP > 65 if possible

## 2018-05-18 NOTE — PROGRESS NOTE ADULT - PROBLEM SELECTOR PLAN 1
- S/p TTE 5/11 personally reviewed and showed EF 40% with decreased RV function; likely due to amyloidosis per cardio (appreciated)   - CHF recs appreciated; will hold ACE INH, and toprol for now given hypotension and BRYCE.  - Torsemide re-initiated given patient no hospice care    - Low salt diet  - Monitor BPs closely  - Poor prognosis given poor functional status and severe systolic CHF suspected to be due to amyloidosis  - Palliative care consulted; appreciate recs. Home hospice approved

## 2018-05-19 VITALS — WEIGHT: 123.46 LBS

## 2018-05-19 PROCEDURE — 99239 HOSP IP/OBS DSCHRG MGMT >30: CPT

## 2018-05-19 RX ORDER — CARVEDILOL PHOSPHATE 80 MG/1
1 CAPSULE, EXTENDED RELEASE ORAL
Qty: 0 | Refills: 0 | COMMUNITY

## 2018-05-19 RX ORDER — FUROSEMIDE 40 MG
1 TABLET ORAL
Qty: 0 | Refills: 0 | COMMUNITY

## 2018-05-19 RX ORDER — ISOSORBIDE MONONITRATE 60 MG/1
1 TABLET, EXTENDED RELEASE ORAL
Qty: 0 | Refills: 0 | COMMUNITY

## 2018-05-19 RX ADMIN — AMIODARONE HYDROCHLORIDE 200 MILLIGRAM(S): 400 TABLET ORAL at 06:11

## 2018-05-19 RX ADMIN — APIXABAN 5 MILLIGRAM(S): 2.5 TABLET, FILM COATED ORAL at 06:10

## 2018-05-19 RX ADMIN — DORZOLAMIDE HYDROCHLORIDE 1 DROP(S): 20 SOLUTION/ DROPS OPHTHALMIC at 06:11

## 2018-05-19 RX ADMIN — BRIMONIDINE TARTRATE 1 DROP(S): 2 SOLUTION/ DROPS OPHTHALMIC at 06:11

## 2018-05-19 NOTE — PROGRESS NOTE ADULT - PROBLEM SELECTOR PROBLEM 9
Prostate cancer
Prostate cancer
Need for prophylactic measure
Prostate cancer

## 2018-05-19 NOTE — PROGRESS NOTE ADULT - PROBLEM SELECTOR PROBLEM 3
Depression, unspecified depression type
Hypothyroid
Paroxysmal atrial fibrillation
Depression, unspecified depression type
BRYCE (acute kidney injury)

## 2018-05-19 NOTE — PROGRESS NOTE ADULT - PROBLEM SELECTOR PROBLEM 6
Elevated TSH
Temporal arteritis
Glaucoma
Elevated TSH
Episode of recurrent major depressive disorder, unspecified depression episode severity
Glaucoma
Elevated TSH

## 2018-05-19 NOTE — PROGRESS NOTE ADULT - PROBLEM SELECTOR PLAN 3
- Continue Remeron.
- CHADSVASC= 5  - Rate control Amiodarone; BB now on hold given hypotension   - Anticoagulate with Eliquis 5mg bid
- CHADSVASC= 5  - Rate control Amiodarone; BB now on hold given hypotension   - Anticoagulation changed to 2.5mg BID per renal recs due to decreased CrCl in the setting of BRYCE
- CHADSVASC= 5  - Rate control Amiodarone; BB now on hold given hypotension   - Change dose back to 5mg BID given improvement in CrCl
- CHADSVASC= 5  - Rate control Amiodarone; BB now on hold given hypotension   - Eliquis 5mg BID
- Continue Remeron.
-Med team managing. Repeat as outpatient.
-TSH 5.5. Please get T3 and Free T4.
- CHADSVASC= 5  - Rate control Amiodarone; BB now on hold given hypotension   - Change dose back to 5mg BID given improvement in CrCl
- Continue Remeron.
Baseline SCr appears to be ~1.1; likely due to contraction in the setting of diuresis  - SCr improving  - Transition Lasix from 40mg IV BID to PO BID  - Monitor BMP

## 2018-05-19 NOTE — PROGRESS NOTE ADULT - PROVIDER SPECIALTY LIST ADULT
Electrophysiology
Heart Failure
Hospitalist
Nephrology
Nephrology
Heart Failure
Hospitalist

## 2018-05-19 NOTE — PROGRESS NOTE ADULT - ATTENDING COMMENTS
Pt seen and examined, stable for discharge. Discussed plan with pt's wife via telephone. 35 minutes spent on discharge planning. Pt seen and examined, stable for discharge. Voicemail left on wife, Robyn's telephone. 35 minutes spent on discharge planning.

## 2018-05-19 NOTE — PROGRESS NOTE ADULT - PROBLEM SELECTOR PROBLEM 2
Atrial fibrillation, unspecified type
BRYCE (acute kidney injury)
Atrial fibrillation, unspecified type

## 2018-05-19 NOTE — PROGRESS NOTE ADULT - PROBLEM SELECTOR PLAN 1
- S/p TTE 5/11 showed EF 40% with decreased RV function; likely due to amyloidosis per cardio (appreciated)   - CHF recs appreciated; will hold ACE INH, and toprol for now given hypotension and BRYCE.  - Torsemide re-initiate  - Low salt diet  - Monitor BPs closely  - Poor prognosis given poor functional status and severe systolic CHF suspected to be due to amyloidosis  - Palliative care consulted; appreciate recs. Home hospice approved, materials received at home, awaiting discharge.

## 2018-05-19 NOTE — PROGRESS NOTE ADULT - PROBLEM SELECTOR PLAN 7
- Continue Remeron
Currently in remission
Currently stable
- Continue Remeron
Continue Eye drops
Currently stable
- Continue Remeron

## 2018-05-19 NOTE — PROGRESS NOTE ADULT - SUBJECTIVE AND OBJECTIVE BOX
Patient is a 73y old  Male who presents with a chief complaint of Swelling & SOB (17 May 2018 10:23)      SUBJECTIVE / OVERNIGHT EVENTS: No acute events overnight. V-paced on tele. Pt without complaints. Afebrile, no CP, SOB, abd pain, n/v/d. No LE edema.    MEDICATIONS  (STANDING):  amiodarone    Tablet 200 milliGRAM(s) Oral daily  apixaban 5 milliGRAM(s) Oral every 12 hours  atorvastatin 20 milliGRAM(s) Oral at bedtime  brimonidine 0.2% Ophthalmic Solution 1 Drop(s) Both EYES two times a day  dorzolamide 2% Ophthalmic Solution      dorzolamide 2% Ophthalmic Solution 1 Drop(s) Both EYES three times a day  famotidine    Tablet 20 milliGRAM(s) Oral daily  latanoprost 0.005% Ophthalmic Solution 1 Drop(s) Both EYES at bedtime  mirtazapine 45 milliGRAM(s) Oral at bedtime  sodium chloride 0.9% lock flush 3 milliLiter(s) IV Push every 8 hours  torsemide 20 milliGRAM(s) Oral daily    MEDICATIONS  (PRN):          PHYSICAL EXAM:  Vital Signs Last 24 Hrs  T(C): 36.8 (19 May 2018 06:06), Max: 36.8 (19 May 2018 06:06)  T(F): 98.2 (19 May 2018 06:06), Max: 98.2 (19 May 2018 06:06)  HR: 69 (19 May 2018 06:06) (69 - 80)  BP: 96/57 (19 May 2018 06:06) (96/57 - 121/64)  BP(mean): --  RR: 16 (19 May 2018 06:06) (16 - 17)  SpO2: 100% (19 May 2018 06:06) (100% - 100%)  GENERAL: Alert, awake, NAD, well-developed  HEAD:  Atraumatic, Normocephalic  EYES: conjunctiva and sclera clear  NECK: Supple, No JVD  CHEST/LUNG: Clear to auscultation bilaterally; No wheeze  HEART: Regular rate and rhythm; No murmurs, rubs, or gallops  ABDOMEN: Soft, Nontender, Nondistended; Bowel sounds present  EXTREMITIES:  2+ Peripheral Pulses, No clubbing, cyanosis, or edema  NEUROLOGY: Non-focal  SKIN: No Rashes or lesions      LABS:    No labs    RADIOLOGY & ADDITIONAL TESTS:    Imaging Personally Reviewed:    Consultant(s) Notes Reviewed:  Hospice	    Care Discussed with Consultants/Other Providers:

## 2018-05-19 NOTE — PROGRESS NOTE ADULT - PROBLEM SELECTOR PROBLEM 5
Glaucoma
Elevated TSH
CVA, old, hemiparesis
Elevated TSH
Elevated TSH
CVA, old, hemiparesis
CVA, old, hemiparesis

## 2018-05-19 NOTE — PROGRESS NOTE ADULT - PROBLEM SELECTOR PLAN 2
Baseline SCr appears to be ~1.1; likely due to ATN from volume contraction and hypotension   - Renal recs appreciated; will hold all anti-HTN's and ACE INH at this time   - Monitor BMP  - Keep MAP > 65 if possible

## 2018-05-19 NOTE — PROGRESS NOTE ADULT - PROBLEM SELECTOR PLAN 6
TSH 5.55  - free T4 slightly elevated and T3 decreased c/w sick euthyroid picture. Would repeat TFTs as outpt when pt stable.  -currently no signs or symptoms of thyroid disease clinically
Currently stable
Continue Eye drops
- Continue Remeron
Continue Eye drops
TSH 5.55  - free T4 slightly elevated and T3 decreased c/w sick euthyroid picture. Would repeat TFTs as outpt when pt stable.  -currently no signs or symptoms of thyroid disease clinically

## 2018-05-19 NOTE — PROGRESS NOTE ADULT - PROBLEM SELECTOR PROBLEM 7
Episode of recurrent major depressive disorder, unspecified depression episode severity
Prostate cancer
Temporal arteritis
Episode of recurrent major depressive disorder, unspecified depression episode severity
Glaucoma
Temporal arteritis
Episode of recurrent major depressive disorder, unspecified depression episode severity

## 2018-05-19 NOTE — PROGRESS NOTE ADULT - ASSESSMENT
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 temporal arteritis who presented on 5/8 referred by primary care for b/l LE edema, ascites in setting of apparent dietary indiscretion. On exam, elevated JVD, NAD, RRR, no m/r/g, lungs clear, abdomen distended, 2+ LE b/l. Labs reviewed - BUN/Cr 23/1.13 (from 1.56 3/18), Hb 12.1, Trop T 0.2. TTE 8/17 reviewed - severe concentric LVH (septum 1.7 cm, PW 1.7 cm), LVEDD 3.3 cm, EF 15%, biatrial enlargement, RV dysfunction, moderate MR/TR. 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    Still moderately volume overloaded.
73M CHFrEF (last EF 14% Aug 2017), Afib on Eliquis, PPM, CVA with R sided hemiparesis and expressive aphasia, HTN, temporal arteritis s/p rx, p/w acute on chronic HFref exacerbation
Patient is a 72 y/o man with CHF with reduced EF, CVA w/ residual right weakness, afib on a/c, prostate ca, HTN who presented with acute CHF exacerbation and developed BRYCE inpatient.
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 temporal arteritis who presented on 5/8 referred by primary care for b/l LE edema, ascites in setting of apparent dietary indiscretion. Improving with diuresis.
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 temporal arteritis who presented on 5/8 referred by primary care for b/l LE edema, ascites in setting of apparent dietary indiscretion. On exam, elevated JVD, NAD, RRR, no m/r/g, lungs clear, abdomen distended, 2+ LE b/l. Labs reviewed - BUN/Cr 23/1.13 (from 1.56 3/18), Hb 12.1, Trop T 0.2. TTE 8/17 reviewed - severe concentric LVH (septum 1.7 cm, PW 1.7 cm), LVEDD 3.3 cm, EF 15%, biatrial enlargement, RV dysfunction, moderate MR/TR. 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    Still moderately volume overloaded.
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 temporal arteritis who presented on 5/8 referred by primary care for b/l LE edema, ascites in setting of apparent dietary indiscretion. On exam, elevated JVD, NAD, RRR, no m/r/g, lungs clear, abdomen distended, 2+ LE b/l. Labs reviewed - BUN/Cr 23/1.13 (from 1.56 3/18), Hb 12.1, Trop T 0.2. TTE 8/17 reviewed - severe concentric LVH (septum 1.7 cm, PW 1.7 cm), LVEDD 3.3 cm, EF 15%, biatrial enlargement, RV dysfunction, moderate MR/TR. 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    Volume status greatly improved, but still continues to have moderate JVP, could be related to stiff atria/amyloid.
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 temporal arteritis who presented on 5/8 referred by primary care for b/l LE edema, ascites in setting of apparent dietary indiscretion. On exam, elevated JVD, NAD, RRR, no m/r/g, lungs clear, abdomen distended, 2+ LE b/l. Labs reviewed - BUN/Cr 23/1.13 (from 1.56 3/18), Hb 12.1, Trop T 0.2. TTE 8/17 reviewed - severe concentric LVH (septum 1.7 cm, PW 1.7 cm), LVEDD 3.3 cm, EF 15%, biatrial enlargement, RV dysfunction, moderate MR/TR. 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 Volume status greatly improved, but still continues to have moderate JVP, could be related to stiff atria/amyloid.    Hypotensive overnight and this morning.
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 temporal arteritis who presented on 5/8 referred by primary care for b/l LE edema, ascites in setting of apparent dietary indiscretion. On exam, elevated JVD, NAD, RRR, no m/r/g, lungs clear, abdomen distended, 2+ LE b/l. Labs reviewed - BUN/Cr 23/1.13 (from 1.56 3/18), Hb 12.1, Trop T 0.2. TTE 8/17 reviewed - severe concentric LVH (septum 1.7 cm, PW 1.7 cm), LVEDD 3.3 cm, EF 15%, biatrial enlargement, RV dysfunction, moderate MR/TR. 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 Volume status greatly improved, but still continues to have moderate JVP, could be related to stiff atria/amyloid.  Hypotensive overnight on 5/15/18 w/ BRYCE. Pt not eating and drinking well.     Hypovolemic.
73 year old man w/ h/o HFrEF (EF 15%, LVEDD 3.3 cm), prior CVA 2013 w/ residual expressive aphasia and R hemiparesis, afib (on Eliquis), prostate CA s/p brachytherapy, ? temporal arteritis who presented on 5/8 referred by primary care for b/l LE edema, ascites in setting of apparent dietary indiscretion. Improving with diuresis.
73M CHFrEF (last EF 14% Aug 2017), Afib on Eliquis, PPM, CVA with R sided hemiparesis and expressive aphasia, HTN, temporal arteritis s/p rx, p/w acute on chronic HFref exacerbation
73M CHFrEF (last EF 14% Aug 2017), Afib on Eliquis, PPM, CVA with R sided hemiparesis and expressive aphasia, HTN, temporal arteritis s/p rx, p/w acute on chronic HFref exacerbation likely due to medication non-adherence, course now complicated by hypotension and BRYCE, made home hospice and awaiting discharge.
73M CHFrEF (last EF 14% Aug 2017), Afib on Eliquis, PPM, CVA with R sided hemiparesis and expressive aphasia, HTN, temporal arteritis s/p rx, p/w acute on chronic HFref exacerbation likely due to medication non-adherence, course now complicated by hypotension and BRYCE, made home hospice and awaiting discharge.
73M CHFrEF (last EF 14% Aug 2017), Afib on Eliquis, PPM, CVA with R sided hemiparesis and expressive aphasia, HTN, temporal arteritis s/p rx, p/w acute on chronic HFref exacerbation likely due to medication non-adherence, course now complicated by hypotension and BRYCE.
73M CHFrEF (last EF 14% Aug 2017), Afib on Eliquis, PPM, CVA with R sided hemiparesis and expressive aphasia, HTN, temporal arteritis s/p rx, p/w acute on chronic HFref exacerbation likely due to medication non-adherence, course now complicated by hypotension and BRYCE.
Patient is a 74 y/o man with CHF with reduced EF, CVA w/ residual right weakness, afib on a/c, prostate ca, HTN who presented with acute CHF exacerbation and developed BRYCE inpatient.
73M CHFrEF (last EF 14% Aug 2017), Afib on Eliquis, PPM, CVA with R sided hemiparesis and expressive aphasia, HTN, temporal arteritis s/p rx, p/w acute on chronic HFref exacerbation likely due to medication non-adherence, course now complicated by hypotension and BRYCE, made home hospice and awaiting discharge.
73M CHFrEF (last EF 14% Aug 2017), Afib on Eliquis, PPM, CVA with R sided hemiparesis and expressive aphasia, HTN, temporal arteritis s/p rx, p/w acute on chronic CHF exacerbation
73M CHFrEF (last EF 14% Aug 2017), Afib on Eliquis, PPM, CVA with R sided hemiparesis and expressive aphasia, HTN, temporal arteritis s/p rx, p/w acute on chronic CHF exacerbation
73M CHFrEF (last EF 14% Aug 2017), Afib on Eliquis, PPM, CVA with R sided hemiparesis and expressive aphasia, HTN, temporal arteritis s/p rx, p/w acute on chronic HFref exacerbation likely due to medication non-adherence.
73M CHFrEF (last EF 14% Aug 2017), Afib on Eliquis, PPM, CVA with R sided hemiparesis and expressive aphasia, HTN, temporal arteritis s/p rx, p/w acute on chronic CHF exacerbation

## 2018-05-19 NOTE — PROGRESS NOTE ADULT - PROBLEM SELECTOR PROBLEM 1
Acute on chronic systolic congestive heart failure
Acute on chronic systolic heart failure
BRYCE (acute kidney injury)
Acute on chronic systolic congestive heart failure
Acute on chronic systolic heart failure
BRYCE (acute kidney injury)
Acute on chronic systolic congestive heart failure

## 2018-05-19 NOTE — PROGRESS NOTE ADULT - PROBLEM SELECTOR PROBLEM 4
Cerebrovascular accident (CVA), unspecified mechanism
Acute on chronic renal failure
Acute on chronic renal failure
CVA, old, hemiparesis
Cerebrovascular accident (CVA), unspecified mechanism
Hypokalemia
Cerebrovascular accident (CVA), unspecified mechanism
Hypokalemia

## 2018-05-19 NOTE — PROGRESS NOTE ADULT - PROBLEM SELECTOR PLAN 5
- Continue BP control.  - On Eliquis  - c/w Lipitor 20mg,
Continue Eye drops
TSH 5.55  -will obtain free T4 and T3 in the am
- Continue BP control.  - On Eliquis  - c/w Lipitor 20mg
- Continue BP control.  - On Eliquis  - c/w Lipitor 20mg
- Continue BP control.  - On Eliquis  - c/w Lipitor 20mg,
TSH 5.55  - free T4 slightly elevated and T3 decreased c/w sick euthyroid picture. Would repeat TFTs as outpt when pt stable.  -currently no signs or symptoms of thyroid disease clinically
TSH 5.55  - free T4 slightly elevated and T3 decreased c/w sick euthyroid picture. Would repeat TFTs as outpt when pt stable.  -currently no signs or symptoms of thyroid disease clinically
- Continue BP control.  - On Eliquis  - c/w Lipitor 20mg

## 2018-05-19 NOTE — PROGRESS NOTE ADULT - PROBLEM SELECTOR PLAN 4
- Continue BP control.  - On Eliquis  - Pt historically has been on Lipitor 20mg, will resume, unclear why medication was not on med rec
- Continue BP control.  - On Eliquis  - Pt historically has been on Lipitor 20mg, will resume, unclear why medication was not on med rec
- Continue BP control.  - On Eliquis  - c/w Lipitor 20mg,
- Continue BP control.  - On Eliquis  - c/w Lipitor 20mg
- Continue BP control.  - On Eliquis  - c/w Lipitor 20mg,
-BRYCE likely from hypotension and hypovolemia from poor PO intake.   -Discontinued torsemide, Toprol and nahid and lisinopril.  -Giving 250 bolus.
-BRYCE likely from hypotension and hypovolemia from poor PO intake.   -Discontinued torsemide, Toprol and nahid. Decreased lisinopril to 2.5 mg po qd (hold if SBP <95).
Resolved  - Monitor BMP
- Continue BP control.  - On Eliquis  - Pt historically has been on Lipitor 20mg, will resume, unclear why medication was not on med rec
repleted; check BMP in AM

## 2018-05-22 LAB — GAS PNL BLDMV: SIGNIFICANT CHANGE UP

## 2018-06-06 LAB — MISCELLANEOUS - CHEM: SIGNIFICANT CHANGE UP

## 2018-06-06 NOTE — PROGRESS NOTE ADULT - PROBLEM SELECTOR PROBLEM 8
Glaucoma
Need for prophylactic measure
Prostate cancer
Glaucoma
Prostate cancer
Prostate cancer
no
Glaucoma

## 2018-06-21 NOTE — PROGRESS NOTE ADULT - PROBLEM SELECTOR PROBLEM 5
HPI:   Ronda Nunn is a 47year old female who presents for a complete physical exam.    Wt Readings from Last 6 Encounters:  06/21/18 : 117 lb  10/18/17 : 115 lb  09/28/17 : 117 lb  08/03/17 : 117 lb  06/16/17 : 119 lb  02/10/17 : 120 lb    Body mass in congestion, sinus pain or ST  LUNGS: denies shortness of breath with exertion  CARDIOVASCULAR: denies chest pain on exertion  GI: denies abdominal pain,denies heartburn  NEURO: denies headaches  PSYCHE: denies depression or anxiety  HEMATOLOGIC: denies hx Questions answered and patient indicates understanding of these issues and agrees to the plan. Follow up in 1 year  or sooner if needed. Acute kidney injury superimposed on CKD Atrial fibrillation

## 2018-07-09 NOTE — PROGRESS NOTE ADULT - PROBLEM SELECTOR PLAN 8
Continue Eye drops
Continue Eye drops
VTE with Eliquis.  Fall, Aspirations, safety and seizure precautions.
Currently in remission. Outpt f/u
Continue Eye drops
Currently in remission. Outpt f/u
Currently in remission. Outpt f/u
How Severe Are Your Spot(S)?: mild
Have Your Spot(S) Been Treated In The Past?: has not been treated
Hpi Title: Evaluation of Skin Lesions

## 2018-07-16 PROBLEM — C61 MALIGNANT NEOPLASM OF PROSTATE: Chronic | Status: ACTIVE | Noted: 2017-08-03

## 2018-08-30 ENCOUNTER — APPOINTMENT (OUTPATIENT)
Dept: ELECTROPHYSIOLOGY | Facility: CLINIC | Age: 73
End: 2018-08-30

## 2018-12-01 NOTE — DISCHARGE NOTE ADULT - SECONDARY DIAGNOSIS.
Assessment   1. Abnormal ECG (R94.31)   2. Denies alcohol consumption (Z78.9)   3. Non-smoker (Z78.9)   4. WPW (Antoni-Parkinson-White syndrome) (I45.6)   · Symptomatic preexcitation WPW anterior septal accessory pathway.      I discussed with him at length and mom was on speaker phone      treatment options will recommend ablation as this is symptomatic. Risks of AV node      injury requiring pacemaker, cardiac tamponade, vascular, pulmonary and other comp      occasions were discussed at length. He is starting college this Thursday      I instructed him not to participate in competitive or any exertional sports.      To report to ER if any dizziness or lightheadedness or prolonged palpitations.      Prior to going will do noninvasive testing including 24 Holter, echo and a stress test      for risk stratification of accessory pathway.    Orders  WPW (Antoni-Parkinson-White syndrome)    · CD ECHO 2D W CON COMP W DOP/COLOR-ADLT; Status:Active - Perform Order;  Requested for:[id number];    Perform:Other; Due:52Cjv0840;Ordered; For:WPW (Antoni-Parkinson-White syndrome); Ordered By:JESSICA GARCIA;   · CD HOLTER-ADULT; Status:Active - Perform Order; Requested for:12Rlq2154;    Perform:Other; Due:94Bah7205;Ordered; For:WPW (Antoni-Parkinson-White syndrome); Ordered By:JESSICA GARCIA;   · CD STRESS-ADULT; Status:Active - Perform Order; Requested for:10Tge8463;    Perform:Other; Due:25Muw1114; Marked Important;Ordered; For:WPW (Antoni-Parkinson-White syndrome); Ordered By:JESSICA GARCIA;    Reason For Visit    Patient presents for a consult . Abnormal ECG per Dr Le's office today. Pt c/o chest pain for the last few days and may take away momentarily after he takes his inhalers, but feels his heart rate go up after that. Pt states he was diagnosed with exercise induced bronchospasms when he was in high school.   Accompanied By: alone.   :  services not used.       Referred By: Dr Le-PCP      History of  Present Illness    Patient presented to PCP today with complaints of chest pain. Stated has taken broncho inhalers for bronchospasms and noticed his heart rate goes fast. PCP did ECG and found to be abnormal. Here to see cardiologist for follow up.   EKG shows WPW left bundle pattern.  He reports exertional palpitations and shortness of breath. He does report palpitation even without exertion, not associated with dizziness, lightheadedness or loss of consciousness.  There is no family history of WPW or sudden cardiac death.  He is currently finishing up freshman year at [employer] and transferring to [employer].       Presenting Meds   1. Advair Diskus AEPB; INHALE 1 PUFF DAILY;   Therapy: (Recorded:24Rof5356) to Recorded   2. Flovent  MCG/ACT Inhalation Aerosol; INHALE 2 PUFFS TWICE DAILY;   Therapy: (Recorded:81Kba1218) to Recorded   3. No Reported Medications Recorded   4. ProAir  (90 Base) MCG/ACT Inhalation Aerosol Solution; INHALE 2 PUFFS DAILY;   Therapy: 25Zdv2398 to Recorded    Review of Systems    Systemic: no fever, no chills and no recent weight change.   Cardiovascular: palpitations . as noted in HPI.   Pulmonary: no chronic cough, no hemoptysis, no new dyspnea and no sputum.   Gastrointestinal: no melena, no bleeding and no hematemesis.   Genitourinary: no hematuria, no urinary urgency and no dysuria.   Hematologic and Lymphatic: no tendency for easy bruising and no tendency for easy bleeding.   Musculoskeletal: no diffuse joint pain and no myalgias.   Neurological: no headache, no dizziness, no fainting,  and no seizures.   Psychiatric: no feelings of hopelessness, no anhedonia and no depression.   Integumentary and Breasts: no rashes, no skin lesions and no skin ulcer.      Past Medical History   1. History of Known health problems: none (Z78.9)    Surgical History   1. Denied: History Of Prior Surgery    Family History   1. Family history of hypertension (Z82.49) : Mother    Social  History   · Denies alcohol consumption (Z78.9)   · Non-smoker (Z78.9)    Physical Exam    General Appearance   Not in acute distress.    Head   No trauma, normocephalic.    Neck   No elevated JVP.    Eyes   Conjunctivae not injected, no xanthelasma.    Ears, Nose, Throat:   Mucosa pink and moist.    Lungs   Full expansion and clear to auscultation    Cardiovascular   Palpation of heart: PMI not displaced, no lifts, thrills or rub.    Auscultation of heart: Regular rhythm, normal S1,S2 without S3. No pathological murmurs.    Carotid pulses: Normal without bruits.    Femoral pulses: Normal.    Pedal pulses: Normal.    Examination of extremities for edema and/or varicosities: No peripheral edema.    Abdomen   No masses, tenderness or hepatosplenomegaly.    Abdominal aorta: Not enlarged.    Rectal Exam: Deferred   Musculoskeletal   Normal gait, normal muscle tone.    Neurologic   Oriented to person, place and time. Normal affect.    Skin   No rashes, lesions or ulcers.    Nails   Normal without clubbing or cyanosis.       Results/Data    Most Recent Cardiac Diagnostics: EKG personally reviewed WPW.      Signatures   Electronically signed by : JESSICA GARCIA M.D.; Aug 20 2018  4:43PM CST     Angina pectoris Chronic systolic congestive heart failure Atrial fibrillation Acute kidney injury superimposed on CKD Hypokalemia Stroke

## 2019-03-05 NOTE — PATIENT PROFILE ADULT. - PRO ARRIVE FROM
PATIENT INSTRUCTIONS    Treatment:     After Injection Instruction  You have been given an injection (shot) at the [facility] Orth[facility] department.  Injections are given into joints, tendons, or soft tissue for the treatment of pain and inflammation.  The medicine is a corticosteroid, but is often called a steroid or cortisone shot.  The steroid used was Kenalog.  A fast acting pain killer such as Lidocaine may be injected with the steroid to dull the pain for a few hours.  A long acting anesthetic, such as Marcaine, may also be injected with the steroid.  It may last up to 30 hours or wear off as soon as 6 hours.        · The steroid begins to work in 24 to 48 hours and may take 2 weeks to reach full effect.     · Take all of your regular medications, including pain medicine (unless instructed otherwise).     · Some increased pain may occur in the first 24 hours after the injection.  You may apply a cold pack or ice to the injected area for 15 to 20 minutes every 1 to 2 hours for 24 hours to lessen the pain.     · For people with diabetes, your blood sugar may be increased for 1 to 2 days. If you have any questions regarding your blood sugar, please contact your primary care physician.       Call your Orthopaedic physician if you develop any of the following symptoms:  · Fever  · Increased redness of injection site  · If it is not better in 2 weeks     As any other injection, intra-articular hip injections carry their own risk profile.  One of the uncommon but potential undesired effects after the injection would be a temporary numbness in parts of the leg and foot. This generally resolves by itself over a few hours and does not leave any lasting effect.  Due to the increased fall risk associated with this, we recommend that you should rest after the injection and not drive yourself for the rest of the day.  Should you experience this and the symptoms do not improve over the course of the day or get  worse, please contact us, urgent care, EMS.     ICE :  Ice:  apply ice for 20 minutes 3 times a day.  No barrier between the ice and skin is needed when using cubes or frozen peas.  If you are using a chemical ice pack please place a barrier between the ice pack and your skin    Tylenol Arthritis:  Take 1-2 tablets up to 2 times a day as needed for pain    Continue home exercise program     Follow-Up:  Please make an appointment with  Dr. Brody Stone in 1 week for Euflexxa injections          Referral:  NONE     Time left: 3/5/2019 10:37 AM     Please note: 24 hour notice for cancellation of appointment is required.    You may receive a survey in the mail, or via the e-mail address that you have provided.  We would appreciate if you could fill out the survey and provide us with any feedback on your experience regarding your visit today. Thank you for allowing us to provide you with your health care needs.     Do not hesitate to call if you are experiencing severe pain, worsening or change in your pain, have symptoms of infection (fever, warmth, redness, increased drainage), or have any other problem that concerns you ~ [phone] (or [phone] after hours).    Please remember when requesting refills on pain medication that the request should be made by Thursday at the latest. [facility] is open Monday-Friday, 8am-5pm, and closed on the weekends.  No narcotic refills will be filled after hours.    Additional Educational Resources:  For additional resources regarding your symptoms, diagnosis, or further health information, please visit the Health Resources section on [email or web address] or the Online Health Resources section in [employer].         home

## 2019-05-02 NOTE — PROGRESS NOTE ADULT - PROBLEM/PLAN-8
You need to have a D&C hysteroscopy (camera in uterus and scraping) to make sure there is nothing cancerous in your uterus.      Because your cervix is scarred, I need you to place a tablet of misoprostol in the vagina the night before.    
DISPLAY PLAN FREE TEXT

## 2019-06-13 NOTE — ED PROVIDER NOTE - CROS ED ROS STATEMENT
No. MONA screening performed.  STOP BANG Legend: 0-2 = LOW Risk; 3-4 = INTERMEDIATE Risk; 5-8 = HIGH Risk
all other ROS negative except as per HPI

## 2020-07-13 NOTE — ED PROVIDER NOTE - CONSTITUTIONAL DISTRESS
Pt in room asleep  No signs of distress   Will continue to monitor     [address]  07/13/20 [id number] MILD

## 2020-12-25 NOTE — PROVIDER CONTACT NOTE (OTHER) - SITUATION
Bedside and Verbal shift change report given to Gurvinder Rodriguez 45 (oncoming nurse) by Priscilla Rangel RN (offgoing nurse). Report included the following information SBAR, Kardex, MAR and Recent Results. SBP 97

## 2021-02-01 NOTE — PHYSICAL THERAPY INITIAL EVALUATION ADULT - PHYSICAL ASSIST/NONPHYSICAL ASSIST: SIT/SUPINE, REHAB EVAL
1 person assist Otezla Pregnancy And Lactation Text: This medication is Pregnancy Category C and it isn't known if it is safe during pregnancy. It is unknown if it is excreted in breast milk.

## 2021-02-27 NOTE — H&P ADULT - PROBLEM SELECTOR PLAN 1
[facility] Emergency Department. Telemonitoring  Serial EKGs  CE x 2  ECHO Telemonitoring, Serial EKGs, CE x 2, FLP, Consider Cardiology eval Telemonitoring, Serial EKGs & CE's, check FLP, continue BB, statin, Consider Cardiology eval. F/U MD note Based on HPI, patient might have been having typical chest pain. Given the mildly elevated troponins and BNP of 7000+ there is concern for some cardiac etiology. Currently however patient appears stable. Admit to Telemonitoring, Serial EKGs & CE's, check FLP, continue BB, statin, Consider Cardiology eval. F/U MD note

## 2023-01-10 NOTE — ED PROVIDER NOTE - CPE EDP NEURO NORM
normal... 57 y/o female, single, noncaregiver, lives alone, self-reported history of depression and anxiety, sees a therapist at [facility], not seeing a psychiatrist, not on psychotropics, no past psych admissions, no h/o self-injurious behavior or suicide attempts, no h/o violence or legal issues, PMH fibromyalgia, no h/o substance abuse, referred by Royer Oneal (peer counselor) from [facility] for worsening anxiety/depression and difficulty functioning.  Patient requests and meets criteria for voluntary psychiatric admission for safety and stabilization.

## 2023-06-26 NOTE — H&P ADULT - HISTORY OF PRESENT ILLNESS
History provided by the pt's emergency contact, son Jon Salomon , who is currently not present at the bedside and the chart.   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 The pt was advised to go the ED due to anasarca and a concern for CHF exacerbation and urinary retention.  Pt is aphasic at baseline and has difficulty communicating. Per the son, the pt does not monitor his salt intake, has been complaint with all of his medications, Positive orthopnea, Dyspnea on minimal exertion, b/l LE swelling, atraumatic fall 1 week ago and did not seek medical attention. No chest pain, palpitations, nausea, vomit, chills, diaphoresis, diarrhea, constipation, dysuria, hematuria.  In the Ed, the pt is found to be fluid over loaded and was give Lasix 40 mg IV x 1 with positive urine output.
Patient Needs Assistance to Leave Residence...

## 2023-10-01 NOTE — PROGRESS NOTE ADULT - ASSESSMENT
DATE OF OPERATION: 9/30/2023     PREOPERATIVE DIAGNOSIS: Right foot crush injury    POSTOPERATIVE DIAGNOSIS: Right foot crush injury, degloving of the heel pad, plantar fascia avulsion    PROCEDURE PERFORMED: Debridement degloving injury right foot and heel pad down to the level of bone and bone spur fracture, degloving wound measured 15 x 12 cm in surface area, complex closure of laceration right foot totaling 25 cm in length    SURGEON: Poncho Pittman M.D.     ASSISTANT: None    ANESTHESIA: General    SPECIMEN: None    ESTIMATED BLOOD LOSS: 10 mL    IMPLANTS: None      INDICATIONS: The patient is a 63 y.o. male who presented with a crush injury to the right foot and heavy machinery.  This caused a degloving injury to his heel pad and foot and large lacerations.  I discussed the risks and benefits of the procedure which include but are not limited to risks of infection, wound healing complication, neurovascular injury, pain, malunion, non-union, malrotation, and the medical risks of anesthesia including MI, stroke, and death.  Alternatives to surgery were also discussed, including non-operative management, which I did not recommend.  The patient was in agreement with the plan to proceed, and the informed consent was signed and documented.  I met with the patient pre-operatively and marked the operative extremity with their agreement.  We proceeded to the operating room.     DESCRIPTION OF PROCEDURE:  Patient was seen in the preoperative holding area on the day of surgery. The operative site was marked with my initials.  he was taken to the operating room and placed supine on the operative table.  Anesthesia was induced.  The operative extremity was prepped and draped in the normal sterile fashion.  Operative pause was conducted and the correct patient, site, side, procedure, and surgeon's initials on extremity were identified.  The right foot was thoroughly evaluated.  The entirety of the heel pad was degloved  leaving a partially bald calcaneus.  There was a small fracture seen at the insertion of the plantar fascia on imaging that was palpated and showed a avulsion of the plantar fascia.  The posterior neurovascular bundle of the ankle was intact and not fully exposed.  The wounds were thoroughly irrigated with normal saline.  The wound measurement was 15 x 12 cm.  This was debrided using excisional debridement removing any loose nonviable tissue with a knife and rongeur.  This extended down to the level of the bone spur fracture and the calcaneal bone.  After thorough debridement this was then irrigated normal saline.  This was copiously done.  The wounds were then closed in a layered fashion with PDS and nylon suture.  A drain was placed beneath the heel pad to prevent any hematoma formation.  Once the wounds were reapproximated sterile dressings were applied.  He was then placed into a well-padded 3-way splint.  He awoke in the operating room and was taken to PACU in stable condition.    POSTOPERATIVE PLAN: Strict nonweightbearing to the right lower extremity.  Elevate the foot at rest.  We will discuss the plantar fascia avulsion as well as the heel pad degloving with her foot team to discuss additional treatment options that may be needed.  Okay to begin mobilizing with therapy as tolerated.      ____________________________________   Poncho Pittman M.D.   DD: 9/30/2023  7:43 PM     73 y/o male, with a PmHx of CHF with an EF of 20%, AICD, Prostate Ca s/p seed implants, Afib on ac, CVA with residual right hemiparesis, is being admitted to telemetry for r/o acs.

## 2023-11-05 NOTE — PROGRESS NOTE ADULT - ATTENDING COMMENTS
SBP 110s-130s  - Continue (home meds): amlodipine 5 mg Remains hypovolemic.  Will encourage PO and give some IVF.  Hold all diuretics.  Agree with palliative care eval. Patient satting mid-high 90s on 2L O2 via NC  - RVP (11/3/23) negative   - Atrovent nebs q6hs Patient satting mid-high 90s on 2L O2 via NC  - RVP (11/3/23) negative   - CT (9/19/23): diffuse b/l bronchiectasis with multiple areas of distal mucoid impaction and scattered tree-in-bud opacities, findings suggest nonspecific small airways disease, small L pleural effusion   - Patient s/p tobramycin nebs at home, completed 9/14 days of course   - Evaluate with pulmonology regarding restarting tobramycin nebs  - Continue: incentive spirometry    #L sided rib pain  Patient is chronic L sided rib pain; per pulmonology note 10/28/23 patient was referred to pain management for further evaluation. Patient and daughter deny hx falls/trauma to area   - Continue: Klonopin 0.5 mg PRN (for pain)

## 2023-12-14 NOTE — PROVIDER CONTACT NOTE (CRITICAL VALUE NOTIFICATION) - NS PROVIDER READ BACK TO LAB
Database initiated. Patient is A&O independent from home with . States she uses no assistive devices and is RA at baseline. She is hard of hearing. Here for a fall. yes

## 2023-12-30 NOTE — ED ADULT NURSE REASSESSMENT NOTE - TEMPLATE LIST FOR HEAD TO TOE ASSESSMENT
General {\rtf1\wohndq11474\ansi\antujwr4701\ftnbj\uc1\deff0  {\fonttbl{\f0 \fnil Segoe UI;}{\f1 \fnil \fcharset0 Segoe UI;}{\f2 \fnil Times New Aram;}}  {\colortbl ;\dla569\uwdje473\iuyo473 ;\red0\green0\blue0 ;\red0\green0\eyka044 ;\red0\green0\blue0 ;}  {\stylesheet{\f0\fs20 Normal;}{\cs1 Default Paragraph Font;}{\cs2\f0\fs16 Line Number;}{\cs3\f2\fs24\ul\cf3 Hyperlink;}}  {\*\revtbl{Unknown;}}  \xecojp84559\fjbxbh56768\leaxm9459\hywvh8660\wfift5926\eoexe4747\cfuqksp481\xqshnjt171\nogrowautofit\jzoxaf533\formshade\nofeaturethrottle1\dntblnsbdb\fet4\aendnotes\aftnnrlc\pgbrdrhead\pgbrdrfoot  \sectd\uasloy88017\uwbfkg36296\guttersxn0\zxngxynw0208\ngqjyclr9219\zcxtvste8648\odhmskxh4394\aemdehl178\wcpuikl767\sbkpage\pgncont\pgndec  \plain\plain\f0\fs24\ql\plain\f0\fs24\plain\f0\fs20\mlkf7275\hich\f0\dbch\f0\loch\f0\fs20\par  I M\par  \par  56 y o M w/ PMHx of HIV/AIDS, DVT/PE (on Eliquis), L 3rd digit OM s/p amputation 5 years ago presents with L foot erythema, edema, and pain after using a callus  on her foot/shin, found to have cellulitis. \par  \par  \plain\f1\fs20\eygu9238\hich\f1\dbch\f1\loch\f1\cf2\fs20\ul{\field{\*\fldinst HYPERLINK [id number] }{\fldrslt Problem/Plan - 1:}}\plain\f0\fs20\hian3289\hich\f0\dbch\f0\loch\f0\fs20\ql\par  \'b7  {\*\bkmkstart ze83047057015}{\*\bkmkend kd19713616326}Problem: {\*\bkmkstart xu51873864246}{\*\bkmkend cc75207857144}Systemic inflammatory response syndrome (SIRS). \par  \'b7  {\*\bkmkstart wl33378635809}{\*\bkmkend cy35537611764}Plan: {\*\bkmkstart mw25018218686}{\*\bkmkend zh03041778605}On admission met 1/4 SIRS criteria with . Likely 2/2 RLE cellulitis. Lactate 1.2. S/p Vanc 1000mg and Clinda 600mg\par  - c/w Vancomycin 1250mg q12 and Zosyn 3.375g q8 (PCN intolerance - itching, not angioedema or anaphylaxis) \par  - f/u BCx\par  - Rest as below.\plain\f1\fs20\bpoc9857\hich\f1\dbch\f1\loch\f1\cf2\fs20\strike\plain\f0\fs20\olyw5721\hich\f0\dbch\f0\loch\f0\fs20\par  \par  \plain\f1\fs20\hbqt4716\hich\f1\dbch\f1\loch\f1\cf2\fs20\ul{\field{\*\fldinst HYPERLINK [id number] }{\fldrslt Problem/Plan - 2:}}\plain\f0\fs20\ssup6007\hich\f0\dbch\f0\loch\f0\fs20\ql\par  \'b7  {\*\bkmkstart ns93454998366}{\*\bkmkend jm67748216314}Problem: {\*\bkmkstart cy40907005339}{\*\bkmkend yc98951863247}Cellulitis. \par  \'b7  {\*\bkmkstart jv04084864901}{\*\bkmkend tn10862694800}Plan: {\*\bkmkstart np78258367632}{\*\bkmkend ed60312896100}Patient with LLE edema and erythema after using a callus  on her wound and shin for 2 days. Per patient, he previously had L   3rd digit OM s/p amputation 5 years ago. 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  \par  - L XR foot - 3rd digit resection. Hallux IP eroded, infx vs inflammatory. Small well marginated defect/erosion along proximal lateral aspect of 5th \par  proximal distal phalanx base.\par  \par  Plan:\par  - Obtain Podiatry consult in AM\par  - PT consult \par  - Obtain ESR/CRP\par  - Obtain MRI L foot given history of 3rd digit OM and poor wound healing of 1st digit plantar surface ulcer c/f OM\par  - Abx as above.\par  \par  \plain\f1\fs20\iohc0358\hich\f1\dbch\f1\loch\f1\cf2\fs20\ul{\field{\*\fldinst HYPERLINK [id number] }{\fldrslt Problem/Plan - 3:}}\plain\f0\fs20\pygp1390\hich\f0\dbch\f0\loch\f0\fs20\ql\par  \'b7  {\*\bkmkstart ln37107900373}{\*\bkmkend sy67937789593}Problem: {\*\bkmkstart nv17261741572}{\*\bkmkend dg18049444206}AIDS. \par  \'b7  {\*\bkmkstart mk02487746964}{\*\bkmkend mn75669902340}Plan: {\*\bkmkstart mc00928079383}{\*\bkmkend rk45365309474}Known history of HIV/AIDS, diagnosed at age 22. Currently on Biktarvy and Bactrim DS. Per patient, last Tcell <200 and VLUD a few months   ago. Follows at [place]. Patient states in a month, he misses medications 5-7x. \par  - Obtain CD4 and VL\par  - C/w Biktarvy \par  - C/w Bactrim DS for ppx \par  - f/u outpatient at [place].\par  \par  \plain\f1\fs20\mqae7999\hich\f1\dbch\f1\loch\f1\cf2\fs20\ul{\field{\*\fldinst HYPERLINK [id number] }{\fldrslt Problem/Plan - 4:}}\plain\f0\fs20\rhoe9349\hich\f0\dbch\f0\loch\f0\fs20\ql\par  \'b7  {\*\bkmkstart bd86614391839}{\*\bkmkend ru62382453586}Problem: {\*\bkmkstart bt62556134739}{\*\bkmkend yl52797453322}Pulmonary embolism. \par  \'b7  {\*\bkmkstart os51223641398}{\*\bkmkend cz44606993836}Plan: {\*\bkmkstart qd67422559488}{\*\bkmkend ow51497471870}Hx of RLE DVT/PE, first occurred in 01/23, repeat 05/23 at [place] per patient. Was initially started on Xarelto and switched to Eliquis   1 month later per patient preference. Patient takes Eliquis 5mg qd. He misses 5-7 doses/month as he takes all his medications together. Recent [facility] visit 09/23 with CTPE unremarkable for PE. \par  - LLE Duplex negative for DVT in ED, can consider RLE duplex given swelling, however patient already on AC\par  - Increase to Eliquis 5mg BID, last dose 12/29 \par  - c/w PPI (home medication).\par  \par  \plain\f1\fs20\lidg8391\hich\f1\dbch\f1\loch\f1\cf2\fs20\ul{\field{\*\fldinst HYPERLINK [id number] }{\fldrslt Problem/Plan - 5:}}\plain\f0\fs20\mlyk4721\hich\f0\dbch\f0\loch\f0\fs20\ql\par  \'b7  {\*\bkmkstart fc59344462873}{\*\bkmkend bq25780178223}Problem: {\*\bkmkstart gh89148765723}{\*\bkmkend cz47899464419}History of osteomyelitis. \par  \'b7  {\*\bkmkstart ac31997693737}{\*\bkmkend mp32752483393}Plan: {\*\bkmkstart df11505914926}{\*\bkmkend fm00196278479}- See above.\par  \par  \plain\f1\fs20\icfj3764\hich\f1\dbch\f1\loch\f1\cf2\fs20\ul{\field{\*\fldinst HYPERLINK [id number] }{\fldrslt Problem/Plan - 6:}}\plain\f0\fs20\qyfe6855\hich\f0\dbch\f0\loch\f0\fs20\ql\par  \'b7  {\*\bkmkstart or42962932225}{\*\bkmkend wp31542691705}Problem: {\*\bkmkstart hf13220593243}{\*\bkmkend yh38979044533}Need for prophylactic measure. \par  \'b7  {\*\bkmkstart wz60849768704}{\*\bkmkend kr60191552196}Plan: {\*\bkmkstart yn83632707558}{\*\bkmkend ld65189886683}Plan:\par  F: None\par  E: replete K<4, Mg<2\par  N: regular\par  VTE Prophylaxis: Eliquis\par  GI: home PPI\par  C: Full Code\par  D: RMF.\par  \par  \par  }   {\rtf1\tnsyai09012\ansi\fslavbp9918\ftnbj\uc1\deff0  {\fonttbl{\f0 \fnil Segoe UI;}{\f1 \fnil \fcharset0 Segoe UI;}{\f2 \fnil Times New Aram;}}  {\colortbl ;\che633\qjfwu414\crxd875 ;\red0\green0\blue0 ;\red0\green0\zuwf963 ;\red0\green0\blue0 ;}  {\stylesheet{\f0\fs20 Normal;}{\cs1 Default Paragraph Font;}{\cs2\f0\fs16 Line Number;}{\cs3\f2\fs24\ul\cf3 Hyperlink;}}  {\*\revtbl{Unknown;}}  \vevoaa35952\jwocmq52736\xwwyp2244\gztka4599\vazoa1706\tqgpe1362\uaqulcx541\qlbvcuw683\nogrowautofit\dfwehp225\formshade\nofeaturethrottle1\dntblnsbdb\fet4\aendnotes\aftnnrlc\pgbrdrhead\pgbrdrfoot  \sectd\xhzzpj18861\hoskrb12369\guttersxn0\abwgarbg4984\fiktdexg4557\lyewciha7918\yklvsllj8275\rplctbc916\xmljaal276\sbkpage\pgncont\pgndec  \plain\plain\f0\fs24\ql\plain\f0\fs24\plain\f0\fs20\fufa2380\hich\f0\dbch\f0\loch\f0\fs20\par  I M\par  \par  56 y o M w/ PMHx of HIV/AIDS, DVT/PE (on Eliquis), L 3rd digit OM s/p amputation 5 years ago presents with L foot erythema, edema, and pain after using a callus  on her foot/shin, found to have cellulitis. \par  \par  \plain\f1\fs20\xfwp7933\hich\f1\dbch\f1\loch\f1\cf2\fs20\ul{\field{\*\fldinst HYPERLINK [id number] }{\fldrslt Problem/Plan - 1:}}\plain\f0\fs20\maet6622\hich\f0\dbch\f0\loch\f0\fs20\ql\par  \'b7  {\*\bkmkstart ho19595689189}{\*\bkmkend cw65362284968}Problem: {\*\bkmkstart ah33247958850}{\*\bkmkend bi74901077532}Systemic inflammatory response syndrome (SIRS). \par  \'b7  {\*\bkmkstart yk39587187313}{\*\bkmkend qj36995502682}Plan: {\*\bkmkstart ds26034593153}{\*\bkmkend nf91088028329}On admission met 1/4 SIRS criteria with . Likely 2/2 RLE cellulitis. Lactate 1.2. S/p Vanc 1000mg and Clinda 600mg\par  - c/w Vancomycin 1250mg q12 and Zosyn 3.375g q8 (PCN intolerance - itching, not angioedema or anaphylaxis) \par  - f/u BCx\par  - Rest as below.\plain\f1\fs20\abvh1985\hich\f1\dbch\f1\loch\f1\cf2\fs20\strike\plain\f0\fs20\ahfe2372\hich\f0\dbch\f0\loch\f0\fs20\par  \par  \plain\f1\fs20\rgue0644\hich\f1\dbch\f1\loch\f1\cf2\fs20\ul{\field{\*\fldinst HYPERLINK [id number] }{\fldrslt Problem/Plan - 2:}}\plain\f0\fs20\iith1834\hich\f0\dbch\f0\loch\f0\fs20\ql\par  \'b7  {\*\bkmkstart ed85696526017}{\*\bkmkend ep22023957755}Problem: {\*\bkmkstart ad99366128680}{\*\bkmkend sq90494430017}Cellulitis. \par  \'b7  {\*\bkmkstart yx49603047783}{\*\bkmkend tk17692141640}Plan: {\*\bkmkstart wu08913994853}{\*\bkmkend rh32970873471}Patient with LLE edema and erythema after using a callus  on her wound and shin for 2 days. Per patient, he previously had L   3rd digit OM s/p amputation 5 years ago. 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  \par  - L XR foot - 3rd digit resection. Hallux IP eroded, infx vs inflammatory. Small well marginated defect/erosion along proximal lateral aspect of 5th \par  proximal distal phalanx base.\par  \par  Plan:\par  - Obtain Podiatry consult in AM\par  - PT consult \par  - Obtain ESR/CRP\par  - Obtain MRI L foot given history of 3rd digit OM and poor wound healing of 1st digit plantar surface ulcer c/f OM\par  - Abx as above.\par  \par  \plain\f1\fs20\soog5912\hich\f1\dbch\f1\loch\f1\cf2\fs20\ul{\field{\*\fldinst HYPERLINK [id number] }{\fldrslt Problem/Plan - 3:}}\plain\f0\fs20\pnpm0745\hich\f0\dbch\f0\loch\f0\fs20\ql\par  \'b7  {\*\bkmkstart cx46784405145}{\*\bkmkend kx89187067224}Problem: {\*\bkmkstart mz50088046851}{\*\bkmkend ox67196746517}AIDS. \par  \'b7  {\*\bkmkstart rq58003668560}{\*\bkmkend of80648688453}Plan: {\*\bkmkstart qo57940530032}{\*\bkmkend da50992500462}Known history of HIV/AIDS, diagnosed at age 22. Currently on Biktarvy and Bactrim DS. Per patient, last Tcell <200 and VLUD a few months   ago. Follows at [place]. Patient states in a month, he misses medications 5-7x. \par  - Obtain CD4 and VL\par  - C/w Biktarvy \par  - C/w Bactrim DS for ppx \par  - f/u outpatient at [place].\par  \par  \plain\f1\fs20\doki3856\hich\f1\dbch\f1\loch\f1\cf2\fs20\ul{\field{\*\fldinst HYPERLINK [id number] }{\fldrslt Problem/Plan - 4:}}\plain\f0\fs20\ivvv7883\hich\f0\dbch\f0\loch\f0\fs20\ql\par  \'b7  {\*\bkmkstart vc26046570453}{\*\bkmkend nk44616582537}Problem: {\*\bkmkstart cd13964414865}{\*\bkmkend tz91789628319}Pulmonary embolism. \par  \'b7  {\*\bkmkstart bn37034037306}{\*\bkmkend eg76864738664}Plan: {\*\bkmkstart kx67006465136}{\*\bkmkend ch15260311864}Hx of RLE DVT/PE, first occurred in 01/23, repeat 05/23 at [place] per patient. Was initially started on Xarelto and switched to Eliquis   1 month later per patient preference. Patient takes Eliquis 5mg qd. He misses 5-7 doses/month as he takes all his medications together. Recent [facility] visit 09/23 with CTPE unremarkable for PE. \par  - LLE Duplex negative for DVT in ED, can consider RLE duplex given swelling, however patient already on AC\par  - Increase to Eliquis 5mg BID, last dose 12/29 \par  - c/w PPI (home medication).\par  \par  \plain\f1\fs20\fxix5215\hich\f1\dbch\f1\loch\f1\cf2\fs20\ul{\field{\*\fldinst HYPERLINK [id number] }{\fldrslt Problem/Plan - 5:}}\plain\f0\fs20\cymc0525\hich\f0\dbch\f0\loch\f0\fs20\ql\par  \'b7  {\*\bkmkstart ta95293145380}{\*\bkmkend ou29313526230}Problem: {\*\bkmkstart pp98311307395}{\*\bkmkend qc28400128341}History of osteomyelitis. \par  \'b7  {\*\bkmkstart zs50649650497}{\*\bkmkend wf26246776074}Plan: {\*\bkmkstart wr63217140746}{\*\bkmkend sd11730766722}- See above.\par  \par  \plain\f1\fs20\vhig6335\hich\f1\dbch\f1\loch\f1\cf2\fs20\ul{\field{\*\fldinst HYPERLINK [id number] }{\fldrslt Problem/Plan - 6:}}\plain\f0\fs20\zftv5857\hich\f0\dbch\f0\loch\f0\fs20\ql\par  \'b7  {\*\bkmkstart vu72426281298}{\*\bkmkend zi13365751953}Problem: {\*\bkmkstart gw72933736794}{\*\bkmkend ax80430995422}Need for prophylactic measure. \par  \'b7  {\*\bkmkstart wv70419119651}{\*\bkmkend sv92860551082}Plan: {\*\bkmkstart lf35350798438}{\*\bkmkend fi49941357870}Plan:\par  F: None\par  E: replete K<4, Mg<2\par  N: regular\par  VTE Prophylaxis: Eliquis\par  GI: home PPI\par  C: Full Code\par  D: RMF.\par  \par  \par  }

## 2024-12-12 NOTE — PROGRESS NOTE ADULT - PROBLEM/PLAN-4
Reason for call:   [x] Refill   [] Prior Auth  [] Other:     Office:   [x] PCP/Provider - [facility]/ Orly Simpson DO  [] Specialty/Provider -     Medication: levothyroxine 125 mcg tablet     Dose/Frequency:  Take 1 tablet (125 mcg total) by mouth daily     Quantity: 30    Pharmacy: [id number] - [place] [address]     Does the patient have enough for 3 days?   [x] Yes   [] No - Send as HP to POD     DISPLAY PLAN FREE TEXT